# Patient Record
Sex: MALE | Race: BLACK OR AFRICAN AMERICAN | NOT HISPANIC OR LATINO | Employment: FULL TIME | URBAN - METROPOLITAN AREA
[De-identification: names, ages, dates, MRNs, and addresses within clinical notes are randomized per-mention and may not be internally consistent; named-entity substitution may affect disease eponyms.]

---

## 2019-06-13 ENCOUNTER — HISTORICAL (OUTPATIENT)
Dept: PREADMISSION TESTING | Facility: HOSPITAL | Age: 49
End: 2019-06-13

## 2019-06-13 ENCOUNTER — HISTORICAL (OUTPATIENT)
Dept: BARIATRICS | Facility: HOSPITAL | Age: 49
End: 2019-06-13

## 2021-11-22 ENCOUNTER — HISTORICAL (OUTPATIENT)
Dept: ADMINISTRATIVE | Facility: HOSPITAL | Age: 51
End: 2021-11-22

## 2021-11-23 ENCOUNTER — HISTORICAL (OUTPATIENT)
Dept: CARDIOLOGY | Facility: HOSPITAL | Age: 51
End: 2021-11-23

## 2021-12-08 ENCOUNTER — HISTORICAL (OUTPATIENT)
Dept: ADMINISTRATIVE | Facility: HOSPITAL | Age: 51
End: 2021-12-08

## 2021-12-08 ENCOUNTER — HISTORICAL (OUTPATIENT)
Dept: BARIATRICS | Facility: HOSPITAL | Age: 51
End: 2021-12-08

## 2021-12-20 ENCOUNTER — HISTORICAL (OUTPATIENT)
Dept: ADMINISTRATIVE | Facility: HOSPITAL | Age: 51
End: 2021-12-20

## 2021-12-21 ENCOUNTER — HISTORICAL (OUTPATIENT)
Dept: CARDIOLOGY | Facility: HOSPITAL | Age: 51
End: 2021-12-21

## 2022-01-12 ENCOUNTER — HISTORICAL (OUTPATIENT)
Dept: BARIATRICS | Facility: HOSPITAL | Age: 52
End: 2022-01-12

## 2022-04-30 NOTE — OP NOTE
DATE OF SURGERY:    11/23/2021    SURGEON:  Sebastian Coto DO    PREOPERATIVE DIAGNOSES:    1. Angina pectoris class III.  2. Abnormal nuclear stress test.  3. Preoperative cardiovascular clearance for bariatric surgery.  4. Morbid obesity.  5. Hypertension.  6. Exertional dyspnea.    POSTOPERATIVE DIAGNOSIS:  Severe distal right coronary artery disease.    PROCEDURES:    1. Selective diagnostic angiography.  2. Left ventriculography.  3. Simultaneous percutaneous transluminal coronary angioplasty and stenting of the distal right coronary artery into the posterior descending artery and into the posterolateral ventricular branch via the right radial artery.    COMPLICATIONS:  None.    ESTIMATED BLOOD LOSS:  None.    CONTRAST UTILIZED:  Approximately 180 cc heparin dose, 15,000 units.    HISTORY:  Mr. Puckett was brought to the cardiac catheterization laboratory today for above outlined procedures.  Informed consent was obtained.    DESCRIPTION OF PROCEDURE:  Using ultrasound fluoroscopic guidance, we accessed the right radial artery and positioned a 6-Azeri Slender sheath and proceeded with selective diagnostic coronary angiography using a Cristhian catheter.  Selective diagnostic coronary angiography revealed mild diffuse luminal irregularities throughout the left main, left anterior descending, and left circumflex coronary arteries.  The RCA was a rather large superdominant vessel and the patient was noted to have an approximate 75% lesion in the proximal portion of the PDA branch.  This was a rather large vessel as well.  Left ventriculography showed left ventricular ejection fraction in the range of 70%.     In light of the above findings and the clinical presentation and symptoms, we further interrogated the RCA lesion using intravascular ultrasonography, which revealed severe fibrocalcific coronary artery disease extending from the proximal portion of the PDA, through its ostium, and into the distal  portion of the right coronary artery.  The patient was thought to have an approximate 80% to 90% stenosis.  In light of the above findings, we then upsized to a 7-Syriac sheath system, used a 7-Syriac JR4 guiding catheter.  Through this, we were able to place a coronary wire into the PDA and posterolateral ventricular branches of the right coronary artery.  Balloon angioplasty was then performed in the PDA lesion with a 3 x 12 mm balloon inflated to nominal atmospheres.  We then simultaneously deployed a 3 x 30 mm drug-eluting stent into the PDA branch extending into the distal portion of the right coronary artery, and a 3 x 26 mm drug-eluting stent into the posterolateral ventricular branch extending approximately into the distal portion of the right coronary artery as well.  Both stents were independently deployed and post dilated to a maximum of 12 atmospheres.  We then simultaneously dilated both stents to 8 atmospheres.  Subsequent angiogram showed 0% residual stenosis in a tortuous PDA branch as well as a widely patent posterolateral ventricular branch and distal right coronary artery.  OSMAN-3 distal flow was noted throughout both vessels.  The intravascular ultrasound of the right coronary artery also showed moderate diffuse fibrocalcific lesions throughout the rather large right coronary artery.  With this, the procedure was completed successfully.  Patient tolerated the procedure rather well.  Left the cardiac catheterization laboratory in stable condition.        ______________________________  Sebastian Coto DO    CT/UD  DD:  11/23/2021  Time:  02:27PM  DT:  11/23/2021  Time:  03:01PM  Job #:  [id number]

## 2022-04-30 NOTE — OP NOTE
Patient:   Hayden Puckett            MRN: [id number]            FIN: [id number]               Age:   51 years     Sex:  Male     :  1970   Associated Diagnoses:   Abnormal nuclear stress test; Atypical angina; Preop cardiovascular exam; CAD in native artery   Author:   Sebastian Coto DO      Brief Operative Note   Operative Information   Date/ Time:  2021 14:04:00.     Preoperative Diagnosis: Abnormal nuclear stress test (BRT11-ZU R94.39), Atypical angina (RLR95-CG I20.8), Preop cardiovascular exam (DSL75-VN Z01.810).     Postoperative Diagnosis: CAD in native artery (CYZ83-RX I25.10).     Procedures Performed: LHC/LVG, via the R RA.   .     Surgeon: Sebastian Coto DO     Esimated blood loss: No blood loss.     Description of Procedure/Findings/    Complications: See op report on chart.   .

## 2022-04-30 NOTE — DISCHARGE SUMMARY
DISCHARGE DATE:      HOSPITAL COURSE:  Mr. Blake was admitted for evaluation and management of suspicious signs and symptoms suggestive of angina pectoris and was found to have an abnormal nuclear stress test recently.  The patient had also recently presented to the emergency department for complaints of chest discomfort and shortness of breath.  His evaluation was unremarkable and in light of his recent abnormal nuclear stress test, we elected to proceed with evaluating the coronary anatomy with angiography.  As outlined in the operative report, he was found to have a severe distal RCA lesion and he underwent simultaneous stenting of the PDA and posterolateral ventricular branches of the right coronary artery with the stents approximating each other simultaneously in the distal portion of the right coronary artery.  He otherwise had mild left coronary artery CAD and a preserved left ventricular systolic ejection fraction.  His postop course is being monitored closely in preparation for discharge home today.    DISCHARGE DIAGNOSES:    1. Severe distal right coronary artery coronary artery disease.  2. Status post simultaneous percutaneous transluminal coronary angioplasty and stenting of the posterior descending artery and posterolateral ventricular branches of the right coronary artery.  3. Mild left coronary artery coronary artery disease.  4. Preserved left ventricular systolic ejection fraction.  5. Massive obesity.  6. Hypertension.  7. Dyslipidemia.  8. Preoperative cardiovascular clearance for bariatric surgery.    RECOMMENDATIONS:  Mr. Puckett will be arranged for discharge home to continue the same medications as on admission and in addition, I am starting him on:  1. Pravastatin 40 mg daily.  2. Plavix 75 mg daily.  3. Baby aspirin daily.  4. Xarelto 2.5 mg twice daily.        I have asked him to call or return should he have any further problems or complaints and I will be checking on his progress  in my office in the very near future.  We will continue to monitor his progress closely and titrate medical therapies as tolerated.  Further recommendations forthcoming.        ______________________________  Sebastian Coto DO    CT/UD  DD:  11/23/2021  Time:  02:27PM  DT:  11/23/2021  Time:  03:11PM  Job #:  [id number]    cc: Sebastian Coto DO

## 2022-04-30 NOTE — OP NOTE
DATE OF SURGERY:    12/21/2021    SURGEON:  Sebastian Coto DO    PREOPERATIVE DIAGNOSES:    1. Peripheral arterial disease with Menan class 3 claudication symptomatology affecting the bilateral lower extremities.  2. Coronary artery disease with a history of recent percutaneous coronary intervention.  3. Morbid obesity.  4. Dyslipidemia.    POSTOPERATIVE DIAGNOSES:    1. Arthritis/leg pain.  2. Morbid obesity.  3. Coronary artery disease history.    PROCEDURE:    1. Abdominal aortography, renal arteriography, runoff arteriography to the bilateral lower extremities.  2. Selective bilateral lower extremity arteriography.    COMPLICATIONS:  None.    ESTIMATED BLOOD LOSS:  None.    CONTRAST UTILIZED:  110 cc.    HEPARIN DOSE:  2000 units.    FLUOROSCOPY TIME:  3 minutes.    FLUOROSCOPY DOSE:  325.11 mGy.    HISTORY:  The patient was brought to the cardiac catheterization laboratory today for above outlined procedures.  Informed consent was obtained.    DESCRIPTION OF PROCEDURE:  Using ultrasound and fluoroscopic guidance, we accessed the right radial artery and positioned a 6-Arabic slender sheath through which we proceeded with peripheral arteriography procedure.  We placed the pigtail catheter in the abdominal aorta at the level of renal arteries and subsequently in the proximal portion of the bilateral superficial femoral artery.  Subsequent angiograms revealed widely patent renal arteries and no angiographically significant obstructive peripheral arterial disease throughout the bilateral lower extremities.  With this, the procedure was completed successfully.  After the right radial artery sheath was positioned, the usual cocktail of intra-arterial nitroglycerin, verapamil, and heparin were administered into the right radial artery, after which we followed with the peripheral arteriography procedure.  The patient tolerated procedure rather well.  Left the cardiac catheterization laboratory in stable  condition.        ______________________________  DO TRACI Mariscal  DD:  12/21/2021  Time:  06:22PM  DT:  12/21/2021  Time:  08:38PM  Job #:  [id number]

## 2022-04-30 NOTE — OP NOTE
Patient:   Hayden Puckett            MRN: [id number]            FIN: [id number]               Age:   51 years     Sex:  Male     :  1970   Associated Diagnoses:   Claudication, class III; PAD (peripheral artery disease); Arthritis; Leg pain   Author:   Sebastian Coto DO      Brief Operative Note   Operative Information   Date/ Time:  2021 15:06:00.     Preoperative Diagnosis: Claudication, class III (WRR27-WW I73.9), PAD (peripheral artery disease) (GMH50-GH I73.9).     Postoperative Diagnosis: Arthritis (QXD68-PB M19.90), Leg pain (FOY59-SD M79.606).     Procedures Performed: B/L LE Arteriogram, via the R RA.   .     Surgeon: Sebastian Coto DO     Esimated blood loss: No blood loss.     Description of Procedure/Findings/    Complications: See op report on chart.   .

## 2022-07-05 ENCOUNTER — HOSPITAL ENCOUNTER (EMERGENCY)
Facility: HOSPITAL | Age: 52
Discharge: HOME | End: 2022-07-06
Attending: EMERGENCY MEDICINE
Payer: COMMERCIAL

## 2022-07-05 DIAGNOSIS — R07.9 CHEST PAIN: ICD-10-CM

## 2022-07-05 DIAGNOSIS — M62.830 BACK MUSCLE SPASM: ICD-10-CM

## 2022-07-05 DIAGNOSIS — R07.89 CHEST WALL PAIN: Primary | ICD-10-CM

## 2022-07-05 LAB
ALBUMIN SERPL-MCNC: 4 GM/DL (ref 3.5–5)
ALBUMIN/GLOB SERPL: 1.4 RATIO (ref 1.1–2)
ALP SERPL-CCNC: 65 UNIT/L (ref 40–150)
ALT SERPL-CCNC: 15 UNIT/L (ref 0–55)
AST SERPL-CCNC: 12 UNIT/L (ref 5–34)
BASOPHILS # BLD AUTO: 0.11 X10(3)/MCL (ref 0–0.2)
BASOPHILS NFR BLD AUTO: 1 %
BILIRUBIN DIRECT+TOT PNL SERPL-MCNC: 0.4 MG/DL
BUN SERPL-MCNC: 15.5 MG/DL (ref 8.4–25.7)
CALCIUM SERPL-MCNC: 9.4 MG/DL (ref 8.4–10.2)
CHLORIDE SERPL-SCNC: 102 MMOL/L (ref 98–107)
CO2 SERPL-SCNC: 27 MMOL/L (ref 22–29)
CREAT SERPL-MCNC: 1 MG/DL (ref 0.73–1.18)
EOSINOPHIL # BLD AUTO: 0.38 X10(3)/MCL (ref 0–0.9)
EOSINOPHIL NFR BLD AUTO: 3.6 %
ERYTHROCYTE [DISTWIDTH] IN BLOOD BY AUTOMATED COUNT: 13.3 % (ref 11.5–17)
GLOBULIN SER-MCNC: 2.9 GM/DL (ref 2.4–3.5)
GLUCOSE SERPL-MCNC: 87 MG/DL (ref 74–100)
HCT VFR BLD AUTO: 42.9 % (ref 42–52)
HGB BLD-MCNC: 13.8 GM/DL (ref 14–18)
IMM GRANULOCYTES # BLD AUTO: 0.05 X10(3)/MCL (ref 0–0.04)
IMM GRANULOCYTES NFR BLD AUTO: 0.5 %
INR BLD: 0.96 (ref 0–1.3)
LYMPHOCYTES # BLD AUTO: 2.99 X10(3)/MCL (ref 0.6–4.6)
LYMPHOCYTES NFR BLD AUTO: 28.3 %
MCH RBC QN AUTO: 30.7 PG (ref 27–31)
MCHC RBC AUTO-ENTMCNC: 32.2 MG/DL (ref 33–36)
MCV RBC AUTO: 95.5 FL (ref 80–94)
MONOCYTES # BLD AUTO: 1.41 X10(3)/MCL (ref 0.1–1.3)
MONOCYTES NFR BLD AUTO: 13.4 %
NEUTROPHILS # BLD AUTO: 5.6 X10(3)/MCL (ref 2.1–9.2)
NEUTROPHILS NFR BLD AUTO: 53.2 %
NRBC BLD AUTO-RTO: 0 %
PLATELET # BLD AUTO: 362 X10(3)/MCL (ref 130–400)
PMV BLD AUTO: 9.3 FL (ref 7.4–10.4)
POTASSIUM SERPL-SCNC: 4 MMOL/L (ref 3.5–5.1)
PROT SERPL-MCNC: 6.9 GM/DL (ref 6.4–8.3)
PROTHROMBIN TIME: 12.7 SECONDS (ref 12.5–14.5)
RBC # BLD AUTO: 4.49 X10(6)/MCL (ref 4.7–6.1)
SODIUM SERPL-SCNC: 137 MMOL/L (ref 136–145)
TROPONIN I SERPL-MCNC: <0.01 NG/ML (ref 0–0.04)
WBC # SPEC AUTO: 10.6 X10(3)/MCL (ref 4.5–11.5)

## 2022-07-05 PROCEDURE — 84484 ASSAY OF TROPONIN QUANT: CPT | Performed by: NURSE PRACTITIONER

## 2022-07-05 PROCEDURE — 85379 FIBRIN DEGRADATION QUANT: CPT | Performed by: EMERGENCY MEDICINE

## 2022-07-05 PROCEDURE — 93010 EKG 12-LEAD: ICD-10-PCS | Mod: ,,, | Performed by: INTERNAL MEDICINE

## 2022-07-05 PROCEDURE — 93010 ELECTROCARDIOGRAM REPORT: CPT | Mod: ,,, | Performed by: INTERNAL MEDICINE

## 2022-07-05 PROCEDURE — 36415 COLL VENOUS BLD VENIPUNCTURE: CPT | Performed by: NURSE PRACTITIONER

## 2022-07-05 PROCEDURE — 85025 COMPLETE CBC W/AUTO DIFF WBC: CPT | Performed by: NURSE PRACTITIONER

## 2022-07-05 PROCEDURE — 85610 PROTHROMBIN TIME: CPT | Performed by: NURSE PRACTITIONER

## 2022-07-05 PROCEDURE — 80053 COMPREHEN METABOLIC PANEL: CPT | Performed by: NURSE PRACTITIONER

## 2022-07-05 PROCEDURE — 99285 EMERGENCY DEPT VISIT HI MDM: CPT | Mod: 25

## 2022-07-05 PROCEDURE — 93005 ELECTROCARDIOGRAM TRACING: CPT

## 2022-07-05 RX ORDER — NITROGLYCERIN 0.4 MG/1
0.4 TABLET SUBLINGUAL EVERY 5 MIN PRN
Status: DISCONTINUED | OUTPATIENT
Start: 2022-07-05 | End: 2022-07-06 | Stop reason: HOSPADM

## 2022-07-05 NOTE — Clinical Note
"Hayden Coughlinjoe Puckett was seen and treated in our emergency department on 7/5/2022.  He may return to work on 07/08/2022.       If you have any questions or concerns, please don't hesitate to call.      WANDA/ Dr Mas RN    "

## 2022-07-06 VITALS
TEMPERATURE: 97 F | OXYGEN SATURATION: 100 % | WEIGHT: 315 LBS | BODY MASS INDEX: 49.44 KG/M2 | RESPIRATION RATE: 15 BRPM | SYSTOLIC BLOOD PRESSURE: 177 MMHG | HEART RATE: 56 BPM | DIASTOLIC BLOOD PRESSURE: 83 MMHG | HEIGHT: 67 IN

## 2022-07-06 LAB
D DIMER PPP IA.FEU-MCNC: <0.27 UG/ML FEU (ref 0–0.5)
TROPONIN I SERPL-MCNC: <0.01 NG/ML (ref 0–0.04)

## 2022-07-06 PROCEDURE — 93010 EKG 12-LEAD: ICD-10-PCS | Mod: ,,, | Performed by: INTERNAL MEDICINE

## 2022-07-06 PROCEDURE — 63600175 PHARM REV CODE 636 W HCPCS: Performed by: EMERGENCY MEDICINE

## 2022-07-06 PROCEDURE — 96372 THER/PROPH/DIAG INJ SC/IM: CPT | Performed by: EMERGENCY MEDICINE

## 2022-07-06 PROCEDURE — 25000003 PHARM REV CODE 250: Performed by: EMERGENCY MEDICINE

## 2022-07-06 PROCEDURE — 36415 COLL VENOUS BLD VENIPUNCTURE: CPT | Performed by: EMERGENCY MEDICINE

## 2022-07-06 PROCEDURE — 93010 ELECTROCARDIOGRAM REPORT: CPT | Mod: ,,, | Performed by: INTERNAL MEDICINE

## 2022-07-06 PROCEDURE — 93005 ELECTROCARDIOGRAM TRACING: CPT

## 2022-07-06 PROCEDURE — 84484 ASSAY OF TROPONIN QUANT: CPT | Performed by: EMERGENCY MEDICINE

## 2022-07-06 RX ORDER — ASPIRIN 325 MG
325 TABLET, DELAYED RELEASE (ENTERIC COATED) ORAL
Status: COMPLETED | OUTPATIENT
Start: 2022-07-06 | End: 2022-07-06

## 2022-07-06 RX ORDER — KETOROLAC TROMETHAMINE 30 MG/ML
30 INJECTION, SOLUTION INTRAMUSCULAR; INTRAVENOUS
Status: COMPLETED | OUTPATIENT
Start: 2022-07-06 | End: 2022-07-06

## 2022-07-06 RX ORDER — METHOCARBAMOL 750 MG/1
1500 TABLET, FILM COATED ORAL 3 TIMES DAILY
Qty: 42 TABLET | Refills: 0 | Status: SHIPPED | OUTPATIENT
Start: 2022-07-06 | End: 2022-07-13

## 2022-07-06 RX ORDER — IBUPROFEN 600 MG/1
600 TABLET ORAL EVERY 6 HOURS PRN
Qty: 20 TABLET | Refills: 0 | Status: SHIPPED | OUTPATIENT
Start: 2022-07-06 | End: 2024-01-12

## 2022-07-06 RX ADMIN — KETOROLAC TROMETHAMINE 30 MG: 30 INJECTION, SOLUTION INTRAMUSCULAR; INTRAVENOUS at 03:07

## 2022-07-06 RX ADMIN — ASPIRIN 325 MG: 325 TABLET, COATED ORAL at 05:07

## 2022-07-06 NOTE — DISCHARGE INSTRUCTIONS
Call Dr Coto' office to schedule a follow-up appointment.  Your EKG was abnormal in the emergency department.  Follow up with Dr. Coto for further evaluation he may need another stress test or another EKG.  If he develops any chest pain chest tightness shortness of breath please return immediately to the emergency department for further evaluation

## 2022-07-06 NOTE — FIRST PROVIDER EVALUATION
Medical screening exam completed.  I have conducted a focused provider triage encounter, findings are as follows:    Brief history of present illness:  53 y/o male who presents with having upper back pain for few days then today while at work started with left side cp and some sob. Hx cardiac stent and on thinners. Cardiologist is dr. Jake soto    There were no vitals filed for this visit.    Pertinent physical exam:  Alert, obese, ambulatory, nonlabored    Brief workup plan:  Ekg, labs, xray    Preliminary workup initiated; this workup will be continued and followed by the physician or advanced practice provider that is assigned to the patient when roomed.

## 2022-07-06 NOTE — ED PROVIDER NOTES
Encounter Date: 7/5/2022    SCRIBE #1 NOTE: I, Dave Dumont, am scribing for, and in the presence of,  Jp Mas. I have scribed the following portions of the note - Other sections scribed: HPI, ROS, PE, MDM.       History     Chief Complaint   Patient presents with    Chest Pain     C/o mid back pain for a few days and today started having cp with mild sob.       I, Jp Mas, assumed care at 0335    51 y/o M presents to ED with c/o left periscapular pain for the last few days. Pt explains he thought he had just slept bad, but the pain continued and it is worse with exertion and when taking deep breaths. When asked about chest pain, he states its hard to explains, but the pain is more in his back. Pt reports having stent put in place December 2021. Denies any medications or allergies.     The history is provided by the patient. No  was used.   Back Pain   This is a new problem. The current episode started several days ago. The problem occurs constantly. The problem has been unchanged. The pain is associated with no known injury. Pain location: periscapular  The pain does not radiate. The symptoms are aggravated by certain positions. The pain is the same all the time. Pertinent negatives include no chest pain, no fever and no abdominal pain. He has tried nothing for the symptoms. The treatment provided no relief. Risk factors include obesity.     Review of patient's allergies indicates:  No Known Allergies  No past medical history on file.  No past surgical history on file.  No family history on file.     Review of Systems   Constitutional: Negative for chills and fever.   Respiratory: Negative for cough and shortness of breath.    Cardiovascular: Negative for chest pain.   Gastrointestinal: Negative for abdominal pain, nausea and vomiting.   Musculoskeletal: Positive for back pain. Negative for myalgias.   Neurological: Negative for syncope.   All other systems reviewed and are  negative.      Physical Exam     Initial Vitals [07/05/22 1959]   BP Pulse Resp Temp SpO2   (!) 168/94 71 18 97.3 °F (36.3 °C) 99 %      MAP       --         Physical Exam    Constitutional: He appears well-developed and well-nourished. No distress.   HENT:   Head: Normocephalic and atraumatic.   Cardiovascular: Normal rate.   Equal radial pulses    Pulmonary/Chest: No respiratory distress. He has no wheezes. He has no rhonchi. He exhibits no tenderness.   Abdominal: Abdomen is soft. He exhibits no distension. There is no abdominal tenderness. There is no rebound and no guarding.   Musculoskeletal:         General: Tenderness present. Normal range of motion.      Comments: Left periscapular tenderness     Neurological: He is alert and oriented to person, place, and time. He has normal strength.   Skin: Skin is warm and dry.   Psychiatric: He has a normal mood and affect.         ED Course   Procedures  Labs Reviewed   CBC WITH DIFFERENTIAL - Abnormal; Notable for the following components:       Result Value    RBC 4.49 (*)     Hgb 13.8 (*)     MCV 95.5 (*)     MCHC 32.2 (*)     Mono # 1.41 (*)     IG# 0.05 (*)     All other components within normal limits   TROPONIN I - Normal   PROTIME-INR - Normal   TROPONIN I - Normal   D DIMER, QUANTITATIVE - Normal   COMPREHENSIVE METABOLIC PANEL        ECG Results          EKG 12-lead (Preliminary result)  Result time 07/06/22 03:58:26    ED Interpretation by Jp Mas MD (07/06/22 03:58:26, [facility] - Emergency Dept, Emergency Medicine)    Repeat EKG [id number]. Normal sinus rhythm 62 beats per minute.  Mild downsloping of the ST segment 3 and AVF no ST elevations.  No significant changes from previous EKG                             Imaging Results          X-Ray Chest 1 View (Preliminary result)  Result time 07/06/22 03:45:51    ED Interpretation by Jp Mas MD (07/06/22 03:45:51, [facility] - Emergency Dept, Emergency  Medicine)    No infiltrates or other acute findings                               Medications   nitroGLYCERIN SL tablet 0.4 mg (has no administration in time range)   aspirin EC tablet 325 mg (has no administration in time range)   ketorolac injection 30 mg (30 mg Intramuscular Given 7/6/22 0345)     Medical Decision Making:   Clinical Tests:   Lab Tests: Reviewed and Ordered  Radiological Study: Reviewed and Ordered          Scribe Attestation:   Scribe #1: I performed the above scribed service and the documentation accurately describes the services I performed. I attest to the accuracy of the note.    Attending Attestation:           Physician Attestation for Scribe:  Physician Attestation Statement for Scribe #1: I, Jp Mas, reviewed documentation, as scribed by Dave Dumont in my presence, and it is both accurate and complete.             ED Course as of 07/06/22 0501   Wed Jul 06, 2022   0345 Troponin and repeat troponin are negative.  Symptoms:  Similar to musculoskeletal pain he has a tenderness in the left back around the scapula which is very was hurting.  States it is worse with movement.  Equal pulses.  To the the symptoms are consistent with aortic dissection or MI at this time [LF]   0353 EKG that was done at 8:00 p.m. on 07/05/2022.  73 per.  Some downsloping of the ST segment in lead 3 and AVF.  No ST segment elevations.  Normal sinus rhythm.  No prior EKGs available for comparison.  His repeat troponin is negative will get repeat EKG at this time.  Patient states he had a stent in December due to some blockage identified on a stress test.  Was having some mild chest pains at that time that felt nothing like his symptoms today [LF]   0318 Discussed the EKG findings with Dr. Coto.  He is familiar with the patient.  He agrees symptoms sound noncardiac.  Have 2-troponins.  He recommend he follow-up in his office as an outpatient. [LF]      ED Course User Index  [LF] Jp Mas MD              Clinical Impression:   Final diagnoses:  [R07.9] Chest pain  [R07.89] Chest wall pain (Primary)  [M62.830] Back muscle spasm          ED Disposition Condition    Discharge Stable        ED Prescriptions     Medication Sig Dispense Start Date End Date Auth. Provider    methocarbamoL (ROBAXIN) 750 MG Tab Take 2 tablets (1,500 mg total) by mouth 3 (three) times daily. for 7 days 42 tablet 7/6/2022 7/13/2022 Jp Mas MD    ibuprofen (ADVIL,MOTRIN) 600 MG tablet Take 1 tablet (600 mg total) by mouth every 6 (six) hours as needed for Pain. 20 tablet 7/6/2022  Jp Mas MD        Follow-up Information     Follow up With Specialties Details Why Contact Info    Sebastian Coto, DO Cardiology Schedule an appointment as soon as possible for a visit   [address] [place] [address]  [phone]      Primary care provider   If your symptoms worsen or change please return to the emergency department for re-evaluation Call your primary care provider to schedule a follow-up appointment within a week           Jp Mas MD  07/06/22 0827

## 2022-11-23 ENCOUNTER — ANESTHESIA (OUTPATIENT)
Dept: ENDOSCOPY | Facility: HOSPITAL | Age: 52
End: 2022-11-23
Payer: COMMERCIAL

## 2022-11-23 ENCOUNTER — HOSPITAL ENCOUNTER (OUTPATIENT)
Facility: HOSPITAL | Age: 52
Discharge: HOME | End: 2022-11-23
Attending: INTERNAL MEDICINE | Admitting: INTERNAL MEDICINE
Payer: COMMERCIAL

## 2022-11-23 ENCOUNTER — ANESTHESIA EVENT (OUTPATIENT)
Dept: ENDOSCOPY | Facility: HOSPITAL | Age: 52
End: 2022-11-23
Payer: COMMERCIAL

## 2022-11-23 VITALS
OXYGEN SATURATION: 96 % | DIASTOLIC BLOOD PRESSURE: 69 MMHG | SYSTOLIC BLOOD PRESSURE: 123 MMHG | RESPIRATION RATE: 21 BRPM | TEMPERATURE: 98 F | HEART RATE: 62 BPM

## 2022-11-23 DIAGNOSIS — Z12.11 SPECIAL SCREENING FOR MALIGNANT NEOPLASM OF COLON: ICD-10-CM

## 2022-11-23 PROCEDURE — 37000008 HC ANESTHESIA 1ST 15 MINUTES: Performed by: INTERNAL MEDICINE

## 2022-11-23 PROCEDURE — 45380 COLONOSCOPY AND BIOPSY: CPT | Performed by: INTERNAL MEDICINE

## 2022-11-23 PROCEDURE — 63600175 PHARM REV CODE 636 W HCPCS: Performed by: ANESTHESIOLOGY

## 2022-11-23 PROCEDURE — 37000009 HC ANESTHESIA EA ADD 15 MINS: Performed by: INTERNAL MEDICINE

## 2022-11-23 PROCEDURE — 25000003 PHARM REV CODE 250: Performed by: ANESTHESIOLOGY

## 2022-11-23 PROCEDURE — 27201423 OPTIME MED/SURG SUP & DEVICES STERILE SUPPLY: Performed by: INTERNAL MEDICINE

## 2022-11-23 RX ORDER — PROPOFOL 10 MG/ML
VIAL (ML) INTRAVENOUS
Status: DISCONTINUED | OUTPATIENT
Start: 2022-11-23 | End: 2022-11-23

## 2022-11-23 RX ORDER — GLYCOPYRROLATE 0.2 MG/ML
INJECTION INTRAMUSCULAR; INTRAVENOUS
Status: DISCONTINUED | OUTPATIENT
Start: 2022-11-23 | End: 2022-11-23

## 2022-11-23 RX ORDER — PROPOFOL 10 MG/ML
VIAL (ML) INTRAVENOUS
Status: COMPLETED
Start: 2022-11-23 | End: 2022-11-23

## 2022-11-23 RX ORDER — LEVOTHYROXINE SODIUM 125 UG/1
125 TABLET ORAL
COMMUNITY

## 2022-11-23 RX ORDER — CLOPIDOGREL BISULFATE 75 MG/1
75 TABLET ORAL DAILY
COMMUNITY

## 2022-11-23 RX ORDER — SODIUM CHLORIDE, SODIUM GLUCONATE, SODIUM ACETATE, POTASSIUM CHLORIDE AND MAGNESIUM CHLORIDE 30; 37; 368; 526; 502 MG/100ML; MG/100ML; MG/100ML; MG/100ML; MG/100ML
INJECTION, SOLUTION INTRAVENOUS ONCE
Status: COMPLETED | OUTPATIENT
Start: 2022-11-23 | End: 2022-11-23

## 2022-11-23 RX ORDER — PRAVASTATIN SODIUM 40 MG/1
40 TABLET ORAL DAILY
COMMUNITY

## 2022-11-23 RX ORDER — HYDROCHLOROTHIAZIDE 25 MG/1
25 TABLET ORAL DAILY
COMMUNITY

## 2022-11-23 RX ORDER — AMLODIPINE AND BENAZEPRIL HYDROCHLORIDE 10; 40 MG/1; MG/1
1 CAPSULE ORAL DAILY
COMMUNITY
End: 2024-01-12

## 2022-11-23 RX ORDER — LIDOCAINE HYDROCHLORIDE 20 MG/ML
INJECTION, SOLUTION EPIDURAL; INFILTRATION; INTRACAUDAL; PERINEURAL
Status: DISCONTINUED
Start: 2022-11-23 | End: 2022-11-23 | Stop reason: HOSPADM

## 2022-11-23 RX ORDER — RANOLAZINE 500 MG/1
500 TABLET, EXTENDED RELEASE ORAL ONCE
COMMUNITY

## 2022-11-23 RX ORDER — MONTELUKAST SODIUM 10 MG/1
10 TABLET ORAL NIGHTLY
COMMUNITY
End: 2024-01-12

## 2022-11-23 RX ORDER — CHOLECALCIFEROL (VITAMIN D3) 25 MCG
2000 TABLET ORAL DAILY
COMMUNITY
End: 2024-01-12

## 2022-11-23 RX ORDER — ASPIRIN 81 MG/1
81 TABLET ORAL DAILY
COMMUNITY

## 2022-11-23 RX ORDER — GLYCOPYRROLATE 0.2 MG/ML
INJECTION INTRAMUSCULAR; INTRAVENOUS
Status: COMPLETED
Start: 2022-11-23 | End: 2022-11-23

## 2022-11-23 RX ADMIN — GLYCOPYRROLATE 0.2 MG: 0.2 INJECTION INTRAMUSCULAR; INTRAVENOUS at 11:11

## 2022-11-23 RX ADMIN — PROPOFOL 100 MG: 10 INJECTION, EMULSION INTRAVENOUS at 11:11

## 2022-11-23 RX ADMIN — PROPOFOL 50 MG: 10 INJECTION, EMULSION INTRAVENOUS at 11:11

## 2022-11-23 RX ADMIN — PROPOFOL 30 MG: 10 INJECTION, EMULSION INTRAVENOUS at 11:11

## 2022-11-23 RX ADMIN — SODIUM CHLORIDE, SODIUM GLUCONATE, SODIUM ACETATE, POTASSIUM CHLORIDE AND MAGNESIUM CHLORIDE: 526; 502; 368; 37; 30 INJECTION, SOLUTION INTRAVENOUS at 10:11

## 2022-11-23 RX ADMIN — PROPOFOL 20 MG: 10 INJECTION, EMULSION INTRAVENOUS at 11:11

## 2022-11-23 RX ADMIN — SODIUM CHLORIDE: 9 INJECTION, SOLUTION INTRAVENOUS at 11:11

## 2022-11-23 NOTE — ANESTHESIA POSTPROCEDURE EVALUATION
Anesthesia Post Evaluation    Patient: Hayden Puckett    Procedure(s) Performed: Procedure(s) (LRB):  COLON (N/A)  POLYPECTOMY    Final Anesthesia Type: MAC      Patient location during evaluation: GI PACU  Patient participation: Yes- Able to Participate  Level of consciousness: awake and alert  Post-procedure vital signs: reviewed and stable  Pain management: adequate  Airway patency: patent  QUEENIE mitigation strategies: Multimodal analgesia    Anesthetic complications: no      Cardiovascular status: stable  Respiratory status: unassisted  Hydration status: euvolemic  Follow-up not needed.          Vitals Value Taken Time   /73 11/23/22 1201   Temp 37 11/23/22 1208   Pulse 66 11/23/22 1201   Resp 28 11/23/22 1201   SpO2 96 % 11/23/22 1201         No case tracking events are documented in the log.      Pain/Kelly Score: Kelly Score: 9 (11/23/2022 12:01 PM)

## 2022-11-23 NOTE — ANESTHESIA PREPROCEDURE EVALUATION
11/23/2022  Hayden Puckett is a 52 y.o., male.      Pre-op Assessment    I have reviewed the Patient Summary Reports.     I have reviewed the Nursing Notes. I have reviewed the NPO Status.   I have reviewed the Medications.     Review of Systems  Cardiovascular:   Hypertension CAD asymptomatic CABG/stent     Pulmonary:   Sleep Apnea, CPAP    Endocrine:  Morbid Obesity / BMI > 40      Physical Exam  General: Well nourished    Airway:  Mallampati: III / II  Mouth Opening: Normal  TM Distance: Normal  Tongue: Large  Neck ROM: Normal ROM    Dental:  Intact    Chest/Lungs:  Clear to auscultation    Heart:  Rate: Normal        Anesthesia Plan  Type of Anesthesia, risks & benefits discussed:    Anesthesia Type: Gen Natural Airway, MAC  Intra-op Monitoring Plan: Standard ASA Monitors  Induction:  IV  Informed Consent: Informed consent signed with the Patient and all parties understand the risks and agree with anesthesia plan.  All questions answered.   ASA Score: 3    Ready For Surgery From Anesthesia Perspective.     .

## 2022-11-23 NOTE — TRANSFER OF CARE
Anesthesia Transfer of Care Note    Patient: Hayden Puckett    Procedure(s) Performed: Procedure(s) (LRB):  COLON (N/A)  POLYPECTOMY    Patient location: GI    Anesthesia Type: general    Transport from OR: Transported from OR on room air with adequate spontaneous ventilation    Post pain: adequate analgesia    Post assessment: no apparent anesthetic complications and tolerated procedure well    Post vital signs: stable    Level of consciousness: awake and alert    Nausea/Vomiting: no nausea/vomiting    Complications: none    Transfer of care protocol was followed      Last vitals:   Visit Vitals  BP (!) 165/85   Pulse 73   Temp 36.6 °C (97.9 °F)   Resp (!) 28   SpO2 98%

## 2022-11-23 NOTE — PROVATION PATIENT INSTRUCTIONS
Discharge Summary/Instructions after an Endoscopic Procedure  Patient Name: Hayden Puckett  Patient MRN: [id number]  Patient YOB: 1970 Wednesday, November 23, 2022  Drake Farley MD  Dear patient,  As a result of recent federal legislation (The Federal Cures Act), you may   receive lab or pathology results from your procedure in your [facility]   account before your physician is able to contact you. Your physician or   their representative will relay the results to you with their   recommendations at their soonest availability.  Thank you,  RESTRICTIONS:  During your procedure today, you received medications for sedation.  These   medications may affect your judgment, balance and coordination.  Therefore,   for 24 hours, you have the following restrictions:   - DO NOT drive a car, operate machinery, make legal/financial decisions,   sign important papers or drink alcohol.    ACTIVITY:  Today: no heavy lifting, straining or running due to procedural   sedation/anesthesia.  The following day: return to full activity including work.  DIET:  Eat and drink normally unless instructed otherwise.     TREATMENT FOR COMMON SIDE EFFECTS:  - Mild abdominal pain, nausea, belching, bloating or excessive gas:  rest,   eat lightly and use a heating pad.  - Sore Throat: treat with throat lozenges and/or gargle with warm salt   water.  - Because air was used during the procedure, expelling large amounts of air   from your rectum or belching is normal.  - If a bowel prep was taken, you may not have a bowel movement for 1-3 days.    This is normal.  SYMPTOMS TO WATCH FOR AND REPORT TO YOUR PHYSICIAN:  1. Abdominal pain or bloating, other than gas cramps.  2. Chest pain.  3. Back pain.  4. Signs of infection such as: chills or fever occurring within 24 hours   after the procedure.  5. Rectal bleeding, which would show as bright red, maroon, or black stools.   (A tablespoon of blood from the rectum is not serious,  especially if   hemorrhoids are present.)  6. Vomiting.  7. Weakness or dizziness.  GO DIRECTLY TO THE NEAREST EMERGENCY ROOM IF YOU HAVE ANY OF THE FOLLOWING:      Difficulty breathing              Chills and/or fever over 101 F   Persistent vomiting and/or vomiting blood   Severe abdominal pain   Severe chest pain   Black, tarry stools   Bleeding- more than one tablespoon   Any other symptom or condition that you feel may need urgent attention  Your doctor recommends these additional instructions:  If any biopsies were taken, your doctors clinic will contact you in 1 to 2   weeks with any results.  - Discharge patient to home.   - Resume previous diet.   - Continue present medications.   - Await pathology results.   - Repeat colonoscopy in 7 years for surveillance.   - Return to GI office PRN.  For questions, problems or results please call your physician - Drake Farley MD at Work:  [phone].  [facility] [place], EMERGENCY ROOM PHONE NUMBER: [phone]  IF A COMPLICATION OR EMERGENCY SITUATION ARISES AND YOU ARE UNABLE TO REACH   YOUR PHYSICIAN - GO DIRECTLY TO THE EMERGENCY ROOM.  MD Drake Banks MD  11/23/2022 11:43:50 AM  This report has been verified and signed electronically.  Dear patient,  As a result of recent federal legislation (The Federal Cures Act), you may   receive lab or pathology results from your procedure in your [facility]   account before your physician is able to contact you. Your physician or   their representative will relay the results to you with their   recommendations at their soonest availability.  Thank you,  PROVATION

## 2022-11-29 LAB
ESTROGEN SERPL-MCNC: NORMAL PG/ML
INSULIN SERPL-ACNC: NORMAL U[IU]/ML
LAB AP CLINICAL INFORMATION: NORMAL
LAB AP GROSS DESCRIPTION: NORMAL
LAB AP REPORT FOOTNOTES: NORMAL
T3RU NFR SERPL: NORMAL %

## 2023-03-30 NOTE — H&P
History and Physical           HPI:     Patient is a 53 y.o. male presents for colorectal cancer screening    PCP:  Primary Doctor No    Review of patient's allergies indicates:  No Known Allergies     Past Medical History:  Past Medical History:   Diagnosis Date    Hypertension     Sleep apnea     Thyroid disease       Past Surgical History:  Past Surgical History:   Procedure Laterality Date    CARDIAC SURGERY      COLONOSCOPY N/A 11/23/2022    Procedure: COLON;  Surgeon: Drake Farley MD;  Location: [facility] ENDOSCOPY;  Service: Gastroenterology;  Laterality: N/A;    THYROID SURGERY      TONSILLECTOMY        Family History:  History reviewed. No pertinent family history.  Social History:  Social History     Tobacco Use    Smoking status: Not on file    Smokeless tobacco: Not on file   Substance Use Topics    Alcohol use: Not on file         Review of Systems:     Review of Systems    Objective:     VITAL SIGNS: 24 HR MIN & MAX LAST    No data recorded  97.9 °F (36.6 °C)        No data recorded  123/69     No data recorded  62     No data recorded  (!) 21    No data recorded  96 %      Physical Exam      No results found for this or any previous visit (from the past 48 hour(s)).    No results found.    @[facility]@    Assessment /Plan:   Colorectal cancer screening-proceed with colonoscopy  There are no problems to display for this patient.       Thank you for allowing us to participate in this patient's care.

## 2023-04-11 ENCOUNTER — PATIENT MESSAGE (OUTPATIENT)
Dept: RESEARCH | Facility: HOSPITAL | Age: 53
End: 2023-04-11
Payer: COMMERCIAL

## 2023-09-01 ENCOUNTER — TELEPHONE (OUTPATIENT)
Dept: ADMINISTRATIVE | Facility: HOSPITAL | Age: 53
End: 2023-09-01
Payer: COMMERCIAL

## 2023-11-16 ENCOUNTER — TELEPHONE (OUTPATIENT)
Dept: FAMILY MEDICINE | Facility: CLINIC | Age: 53
End: 2023-11-16
Payer: COMMERCIAL

## 2023-11-16 NOTE — TELEPHONE ENCOUNTER
----- Message from Maria C Priest sent at 11/14/2023  9:34 AM CST -----  HE ASKED IF WE EVER SENT IN THE PAPERWORK FOR HIS WEIGHT LOSS SURGERY

## 2023-11-16 NOTE — TELEPHONE ENCOUNTER
Spoke with patient in regards to weight loss surgery papers. We did receive them and will be getting them filled out.

## 2024-01-03 ENCOUNTER — OFFICE VISIT (OUTPATIENT)
Dept: FAMILY MEDICINE | Facility: CLINIC | Age: 54
End: 2024-01-03
Payer: COMMERCIAL

## 2024-01-03 VITALS
HEART RATE: 74 BPM | BODY MASS INDEX: 49.44 KG/M2 | RESPIRATION RATE: 20 BRPM | DIASTOLIC BLOOD PRESSURE: 78 MMHG | SYSTOLIC BLOOD PRESSURE: 136 MMHG | WEIGHT: 315 LBS | OXYGEN SATURATION: 99 % | TEMPERATURE: 97 F | HEIGHT: 67 IN

## 2024-01-03 DIAGNOSIS — E78.2 MIXED HYPERLIPIDEMIA: ICD-10-CM

## 2024-01-03 DIAGNOSIS — E55.9 VITAMIN D DEFICIENCY: ICD-10-CM

## 2024-01-03 DIAGNOSIS — Z23 NEED FOR VACCINATION: ICD-10-CM

## 2024-01-03 DIAGNOSIS — Z01.818 PRE-OP EXAM: ICD-10-CM

## 2024-01-03 DIAGNOSIS — E66.01 CLASS 3 SEVERE OBESITY DUE TO EXCESS CALORIES WITH SERIOUS COMORBIDITY AND BODY MASS INDEX (BMI) OF 60.0 TO 69.9 IN ADULT: ICD-10-CM

## 2024-01-03 DIAGNOSIS — Z00.00 ANNUAL PHYSICAL EXAM: Primary | ICD-10-CM

## 2024-01-03 DIAGNOSIS — I10 PRIMARY HYPERTENSION: ICD-10-CM

## 2024-01-03 DIAGNOSIS — G47.30 SLEEP APNEA, UNSPECIFIED TYPE: ICD-10-CM

## 2024-01-03 PROBLEM — E66.813 CLASS 3 SEVERE OBESITY DUE TO EXCESS CALORIES WITH SERIOUS COMORBIDITY AND BODY MASS INDEX (BMI) OF 60.0 TO 69.9 IN ADULT: Status: ACTIVE | Noted: 2024-01-03

## 2024-01-03 PROBLEM — E66.9 OBESITY, UNSPECIFIED: Status: ACTIVE | Noted: 2024-01-03

## 2024-01-03 PROBLEM — R73.03 PRE-DIABETES: Status: ACTIVE | Noted: 2024-01-03

## 2024-01-03 PROBLEM — R73.03 PRE-DIABETES: Status: RESOLVED | Noted: 2024-01-03 | Resolved: 2024-01-03

## 2024-01-03 LAB
BILIRUB SERPL-MCNC: NORMAL MG/DL
BLOOD URINE, POC: NORMAL
CLARITY, POC UA: CLEAR
COLOR, POC UA: YELLOW
GLUCOSE UR QL STRIP: NORMAL
KETONES UR QL STRIP: NORMAL
LEUKOCYTE ESTERASE URINE, POC: NORMAL
NITRITE, POC UA: NORMAL
PH, POC UA: 6
POC CREATININE: 50 MG/DL (ref 0.6–1.3)
POC MICROALBUMIN URINE: 10
POC MICROALBUMIN/CREATININE RATIO: <30 ΜG/MG (ref 0–30)
PROTEIN, POC: NORMAL
SPECIFIC GRAVITY, POC UA: 1.01
UROBILINOGEN, POC UA: NORMAL

## 2024-01-03 PROCEDURE — 3075F SYST BP GE 130 - 139MM HG: CPT | Mod: CPTII,,, | Performed by: FAMILY MEDICINE

## 2024-01-03 PROCEDURE — 1159F MED LIST DOCD IN RCRD: CPT | Mod: CPTII,,, | Performed by: FAMILY MEDICINE

## 2024-01-03 PROCEDURE — 3078F DIAST BP <80 MM HG: CPT | Mod: CPTII,,, | Performed by: FAMILY MEDICINE

## 2024-01-03 PROCEDURE — 1160F RVW MEDS BY RX/DR IN RCRD: CPT | Mod: CPTII,,, | Performed by: FAMILY MEDICINE

## 2024-01-03 PROCEDURE — 81002 URINALYSIS NONAUTO W/O SCOPE: CPT | Mod: ,,, | Performed by: FAMILY MEDICINE

## 2024-01-03 PROCEDURE — 3008F BODY MASS INDEX DOCD: CPT | Mod: CPTII,,, | Performed by: FAMILY MEDICINE

## 2024-01-03 PROCEDURE — 82044 UR ALBUMIN SEMIQUANTITATIVE: CPT | Mod: QW,,, | Performed by: FAMILY MEDICINE

## 2024-01-03 PROCEDURE — 99396 PREV VISIT EST AGE 40-64: CPT | Mod: 25,,, | Performed by: FAMILY MEDICINE

## 2024-01-03 PROCEDURE — 90471 IMMUNIZATION ADMIN: CPT | Mod: ,,, | Performed by: FAMILY MEDICINE

## 2024-01-03 PROCEDURE — 90714 TD VACC NO PRESV 7 YRS+ IM: CPT | Mod: ,,, | Performed by: FAMILY MEDICINE

## 2024-01-03 NOTE — ASSESSMENT & PLAN NOTE
Lab Results   Component Value Date    TRIG 96 12/04/2023    HDL 39 (L) 12/04/2023    LDLCALC 61 12/04/2023   Patient is at goal.

## 2024-01-03 NOTE — PROGRESS NOTES
Family Medicine  Patient Name: Hayden Puckett     Patient ID: [id number]     Chief Complaint: Annual Exam and Pre-op Exam      HPI:     Hayden Puckett is a 53 y.o. male here today for a wellness visit and pre-op physical exam for weight loss surgery. Recommend patient get the Shingles Vaccine at a local pharmacy, patient verbalizes understanding.     274}  Health Maintenance         Date Due Completion Date    Hepatitis C Screening Never done ---    HIV Screening Never done ---    Shingles Vaccine (1 of 2) Never done ---    COVID-19 Vaccine (4 - 2023-24 season) 09/01/2023 1/11/2022    Influenza Vaccine (1) 06/30/2024 (Originally 9/1/2023) 12/30/2022    Hemoglobin A1c (Prediabetes) 12/04/2024 12/4/2023    High Dose Statin 01/03/2025 1/3/2024    Lipid Panel 12/04/2028 12/4/2023    Colorectal Cancer Screening 11/23/2029 11/23/2022    TETANUS VACCINE 01/03/2034 1/3/2024             Past Medical History:   Diagnosis Date    Hypertension     Hypogonadism in male     Hypothyroidism associated with surgical procedure     Insulin resistance     Obesity,     QUEENIE (obstructive sleep apnea)     Sleep apnea     Vitamin D deficiency         Past Surgical History:   Procedure Laterality Date    COLONOSCOPY N/A 11/23/2022    Procedure: COLON;  Surgeon: Drake Farley MD;  Location: [facility] ENDOSCOPY;  Service: Gastroenterology;  Laterality: N/A;    LEFT HEART CATHETERIZATION  2022    MANDIBLE FRACTURE SURGERY      THYROID SURGERY      TONSILLECTOMY          Social History     Socioeconomic History    Marital status:     Number of children: 3   Tobacco Use    Smoking status: Never    Smokeless tobacco: Never   Substance and Sexual Activity    Alcohol use: Never     Social Determinants of Health     Financial Resource Strain: Low Risk  (11/27/2023)    Overall Financial Resource Strain (CARDIA)     Difficulty of Paying Living Expenses: Not very hard   Food Insecurity: No Food Insecurity (11/27/2023)    Hunger Vital  Sign     Worried About Running Out of Food in the Last Year: Never true     Ran Out of Food in the Last Year: Never true   Transportation Needs: No Transportation Needs (11/27/2023)    PRAPARE - Transportation     Lack of Transportation (Medical): No     Lack of Transportation (Non-Medical): No   Physical Activity: Insufficiently Active (11/27/2023)    Exercise Vital Sign     Days of Exercise per Week: 3 days     Minutes of Exercise per Session: 20 min   Stress: No Stress Concern Present (11/27/2023)    [employer] [place] [employer] - Occupational Stress Questionnaire     Feeling of Stress : Not at all   Social Connections: Unknown (11/27/2023)    Social Connection and Isolation Panel [NHANES]     Frequency of Communication with Friends and Family: More than three times a week     Frequency of Social Gatherings with Friends and Family: More than three times a week     Active Member of Clubs or Organizations: No     Attends Club or Organization Meetings: Never     Marital Status:    Housing Stability: Low Risk  (11/27/2023)    Housing Stability Vital Sign     Unable to Pay for Housing in the Last Year: No     Number of Places Lived in the Last Year: 1     Unstable Housing in the Last Year: No        Current Outpatient Medications   Medication Instructions    amLODIPine-benazepriL (LOTREL) 10-40 mg per capsule 1 capsule, Oral, Daily    aspirin (ECOTRIN) 81 mg, Oral, Daily    clopidogreL (PLAVIX) 75 mg, Oral, Daily    hydroCHLOROthiazide (HYDRODIURIL) 25 mg, Oral, Daily    ibuprofen (ADVIL,MOTRIN) 600 mg, Oral, Every 6 hours PRN    levothyroxine (SYNTHROID) 125 mcg, Oral, Before breakfast    montelukast (SINGULAIR) 10 mg, Oral, Nightly    pravastatin (PRAVACHOL) 40 mg, Oral, Daily    ranolazine (RANEXA) 500 mg, Oral, Once    vitamin D (VITAMIN D3) 2,000 Units, Oral, Daily       Review of patient's allergies indicates:  No Known Allergies     Immunization History   Administered Date(s) Administered     "COVID-19, MRNA, LN-S, PF (Pfizer) (Purple Cap) 05/12/2021, 06/02/2021, 01/11/2022    Td (ADULT) 01/03/2024       Patient Care Team:  Alex Calvillo Sr., MD as PCP - General (Family Medicine)  Eliud Lepe MD as Consulting Physician (Cardiology)  Dave Bustillo OD (Optometry)  Jhony Vaz III, MD as Consulting Physician (Otolaryngology)  To Vázquez NP (Pulmonary Disease)  Amor Eaton MD as Consulting Physician (Orthopedic Surgery)     Subjective:     Review of Systems    10 point review of systems conducted, negative except as stated in the history of present illness. See HPI for details.    Objective:     Visit Vitals  /78 (BP Location: Left arm, Patient Position: Sitting, BP Method: Thigh Cuff (Manual))   Pulse 74   Temp 97.2 °F (36.2 °C)   Resp 20   Ht 5' 7" (1.702 m)   Wt (!) 185.5 kg (409 lb)   SpO2 99%   BMI 64.06 kg/m²       Physical Exam  Constitutional:       Appearance: He is obese.   HENT:      Head: Normocephalic and atraumatic.      Right Ear: Tympanic membrane, ear canal and external ear normal.      Left Ear: Tympanic membrane, ear canal and external ear normal.      Mouth/Throat:      Mouth: Mucous membranes are moist.   Eyes:      Extraocular Movements: Extraocular movements intact.      Conjunctiva/sclera: Conjunctivae normal.      Pupils: Pupils are equal, round, and reactive to light.   Cardiovascular:      Rate and Rhythm: Normal rate and regular rhythm.      Pulses:           Carotid pulses are 2+ on the right side and 2+ on the left side.       Dorsalis pedis pulses are 2+ on the right side and 2+ on the left side.        Posterior tibial pulses are 2+ on the right side and 2+ on the left side.   Pulmonary:      Effort: Pulmonary effort is normal.      Breath sounds: Normal breath sounds.   Abdominal:      Palpations: Abdomen is soft.   Musculoskeletal:         General: No swelling or tenderness. Normal range of motion.      Cervical back: Normal range of " motion and neck supple.   Feet:      Right foot:      Protective Sensation: 3 sites tested.  3 sites sensed.      Skin integrity: Skin integrity normal. No ulcer, blister, skin breakdown, erythema, warmth, callus, dry skin or fissure.      Toenail Condition: Right toenails are normal.      Left foot:      Protective Sensation: 3 sites tested.  3 sites sensed.      Skin integrity: Skin integrity normal. No ulcer, blister, skin breakdown, erythema, warmth, callus, dry skin or fissure.      Toenail Condition: Left toenails are normal.      Comments: There is bilateral pes planus.  Lymphadenopathy:      Cervical: No cervical adenopathy.   Skin:     General: Skin is warm and dry.   Neurological:      General: No focal deficit present.      Mental Status: He is alert and oriented to person, place, and time.      Sensory: No sensory deficit.      Motor: No weakness.      Coordination: Coordination normal.   Psychiatric:         Mood and Affect: Mood normal.         Behavior: Behavior normal.         Thought Content: Thought content normal.         Judgment: Judgment normal.         Assessment:       ICD-10-CM ICD-9-CM   1. Annual physical exam  Z00.00 V70.0   2. Primary hypertension  I10 401.9   3. Mixed hyperlipidemia  E78.2 272.2   4. Sleep apnea, unspecified type  G47.30 780.57   5. Vitamin D deficiency  E55.9 268.9   6. Class 3 severe obesity due to excess calories with serious comorbidity and body mass index (BMI) of 60.0 to 69.9 in adult  E66.01 278.01    Z68.44 V85.44   7. Pre-op exam  Z01.818 V72.84   8. Need for vaccination  Z23 V05.9       Plan:   1. Annual physical exam  Overview:  Prostate Cancer Screening - Last PSA 12/04/2023 was 0.5 ng/mL.  Colon Cancer Screening - Last Colonoscopy was on 11/23/2022. Followed by Dr. Farley.  Eye Exam - Recommend annually. Followed by Dr. Bustillo.  Dental Exam - Recommend biannually  Vaccinations -   Immunization History   Administered Date(s) Administered    COVID-19,  MRNA, LN-S, PF (Pfizer) (Purple Cap) 01/11/2022         Orders:  -     POCT EKG 12-LEAD (NOT FOR [facility] USE)  -     POCT Microalbumin/Creatinine Ratio  -     POCT urine dipstick without microscope    2. Primary hypertension  Overview:  Hypertension Medications               amLODIPine-benazepriL (LOTREL) 10-40 mg per capsule Take 1 capsule by mouth once daily.    hydroCHLOROthiazide (HYDRODIURIL) 25 MG tablet Take 25 mg by mouth once daily.        Followed by Cardiology.  Low Sodium Diet (DASH Diet - Less than 2 grams of sodium per day).  Monitor blood pressure daily and log. Report consistent numbers greater than 140/90.  Maintain healthy weight with goal BMI <30. Exercise 30 minutes per day, 5 days per week.     Assessment & Plan:  BP Readings from Last 1 Encounters:   01/03/24 136/78   Patient is at goal.       3. Mixed hyperlipidemia  Overview:  Hyperlipidemia Medications               pravastatin (PRAVACHOL) 40 MG tablet Take 40 mg by mouth once daily.        Followed by Cardiology.  Stressed importance of dietary modifications. Follow a low cholesterol, low saturated fat diet with less that 200mg of cholesterol a day.  Avoid fried foods and high saturated fats (high saturated fats less than 7% of calories).  Add Flax Seed/Fish Oil supplements to diet. Increase dietary fiber.  Regular exercise can reduce LDL and raise HDL. Stressed importance of physical activity 5 times per week for 30 minutes per day.      Assessment & Plan:  Lab Results   Component Value Date    TRIG 96 12/04/2023    HDL 39 (L) 12/04/2023    LDLCALC 61 12/04/2023   Patient is at goal.       4. Sleep apnea, unspecified type  Overview:  Wears CPAP/BiPAP and reports compliance nightly. Life time use expected.  Has experienced improved daytime fatigue.  Avoid excessive alcohol, smoking and overuse of sedatives at bedtime.  Weight management discussed. Goal BMI <30.  Adjust sleep position PRN.     Assessment & Plan:  Patient is well  controlled.      5. Vitamin D deficiency  Overview:  Patient instructed to continue with OTC Vitamin D3  2,000 units daily.  Will continue to monitor.    Assessment & Plan:  Most recent Vitamin D 35.3 ng/mL on 12/04/2023.  Patient is at goal.      6. Class 3 severe obesity due to excess calories with serious comorbidity and body mass index (BMI) of 60.0 to 69.9 in adult  Overview:  Body mass index is 64.06 kg/m².  Goal BMI <30.  Exercise 5 times a week for 30 minutes per day.  Avoid soda, simple sugars, excessive rice, potatoes or bread. Limit fast foods and fried foods.  Choose complex carbs in moderation (example: green vegetables, beans, oatmeal). Eat plenty of fresh fruits and vegetables with lean meats daily.  Do not skip meals. Eat a balanced portion size.  Avoid fad diets. Consider permanent healthy life style changes.       7. Pre-op exam  Comments:  Patient is medically cleared for weight loss surgery from my perspective however he needs to get cardiac clearance before I sign off on this operation    8. Need for vaccination  -     diptheria-tetanus toxoids 2-2 Lf unit/0.5 mL injection 0.5 mL        [x] Discussed lab findings with the patient.  [x] Discussed diet, exercise and if appropriate, weight loss.  [x] Instructions and information, including risks and benefits of prescribed medication(s) have been reviewed with the patient and patient verbalizes understanding. Questions have been answered to the patient's satisfaction.  [] Appropriate counseling has been given regarding anxiety and depressive issues that were discussed today.  [] Any lab drawn today will be reviewed by physician at the time it is received and appropriate recommendations bill be made and discussed with patient.     Follow up in about 6 months (around 7/3/2024) for Follow Up.   In addition to their scheduled follow up, the patient has also been instructed to follow up on as needed basis.     Future Appointments   Date Time Provider  Department Center   7/1/2024  9:00 AM Alex Calvillo Sr., MD [facility]        Alex Calvillo Sr, MD

## 2024-01-10 ENCOUNTER — TELEPHONE (OUTPATIENT)
Dept: BARIATRICS | Facility: CLINIC | Age: 54
End: 2024-01-10
Payer: COMMERCIAL

## 2024-01-12 ENCOUNTER — TELEPHONE (OUTPATIENT)
Dept: FAMILY MEDICINE | Facility: CLINIC | Age: 54
End: 2024-01-12
Payer: COMMERCIAL

## 2024-01-12 ENCOUNTER — TELEPHONE (OUTPATIENT)
Dept: BARIATRICS | Facility: CLINIC | Age: 54
End: 2024-01-12
Payer: COMMERCIAL

## 2024-01-12 DIAGNOSIS — Z01.818 PRE-OP EXAM: ICD-10-CM

## 2024-01-12 DIAGNOSIS — Z01.810 PRE-OPERATIVE CARDIOVASCULAR EXAMINATION, HIGH RISK SURGERY: Primary | ICD-10-CM

## 2024-01-12 DIAGNOSIS — Z01.812 PRE-OPERATIVE LABORATORY EXAMINATION: Primary | ICD-10-CM

## 2024-01-12 RX ORDER — RANOLAZINE 1000 MG/1
1000 TABLET, EXTENDED RELEASE ORAL 2 TIMES DAILY
COMMUNITY

## 2024-01-12 RX ORDER — AMLODIPINE AND BENAZEPRIL HYDROCHLORIDE 5; 40 MG/1; MG/1
1 CAPSULE ORAL 2 TIMES DAILY
COMMUNITY
Start: 2023-12-20

## 2024-01-12 RX ORDER — ACETAMINOPHEN 500 MG
TABLET ORAL
COMMUNITY

## 2024-01-12 NOTE — TELEPHONE ENCOUNTER
Spoke with patient in regards to where he would like to do the xray and labs and he said [facility]. So I went ahead and sent the xray order there.   The lab orders were sent there as well.

## 2024-01-12 NOTE — TELEPHONE ENCOUNTER
----- Message from Alex Calvillo Sr., MD sent at 1/10/2024 12:34 PM CST -----  Regarding: Results  Please have patient come in and draw the requested lab    1.  ----- Message -----  From: Marcela Metz RN  Sent: 1/10/2024  11:49 AM CST  To: Alex Calvillo Sr., MD

## 2024-01-17 ENCOUNTER — TELEPHONE (OUTPATIENT)
Dept: FAMILY MEDICINE | Facility: CLINIC | Age: 54
End: 2024-01-17
Payer: COMMERCIAL

## 2024-02-08 ENCOUNTER — HOSPITAL ENCOUNTER (OUTPATIENT)
Dept: RADIOLOGY | Facility: HOSPITAL | Age: 54
Discharge: HOME | End: 2024-02-08
Attending: FAMILY MEDICINE
Payer: COMMERCIAL

## 2024-02-08 DIAGNOSIS — Z01.810 PRE-OPERATIVE CARDIOVASCULAR EXAMINATION, HIGH RISK SURGERY: ICD-10-CM

## 2024-02-08 PROCEDURE — 71046 X-RAY EXAM CHEST 2 VIEWS: CPT | Mod: TC

## 2024-02-21 ENCOUNTER — TELEPHONE (OUTPATIENT)
Dept: BARIATRICS | Facility: CLINIC | Age: 54
End: 2024-02-21
Payer: COMMERCIAL

## 2024-02-21 DIAGNOSIS — I10 HYPERTENSION, UNSPECIFIED TYPE: ICD-10-CM

## 2024-02-21 DIAGNOSIS — E66.01 MORBID OBESITY: ICD-10-CM

## 2024-02-28 ENCOUNTER — TELEPHONE (OUTPATIENT)
Dept: BARIATRICS | Facility: CLINIC | Age: 54
End: 2024-02-28
Payer: COMMERCIAL

## 2024-03-19 ENCOUNTER — TELEPHONE (OUTPATIENT)
Dept: BARIATRICS | Facility: CLINIC | Age: 54
End: 2024-03-19
Payer: COMMERCIAL

## 2024-03-19 ENCOUNTER — PATIENT MESSAGE (OUTPATIENT)
Dept: BARIATRICS | Facility: CLINIC | Age: 54
End: 2024-03-19
Payer: COMMERCIAL

## 2024-03-19 NOTE — TELEPHONE ENCOUNTER
"VASYL patient on bariatric work queue list, 3/18/24 pt spoke with FS noting:    "Note text:Patient was placed on financial hold in January due to finances. Patient is ready to move forward.    Patient contacted to discuss and advise of accumulations as listed. Reviewed and verified patient demographics. Advised patient of $1500 responsibility due for evaluation if proceeding with initial consult. Patient agrees and understands balances are collected via telephone 7 days prior to scheduled arrival."  "

## 2024-04-01 ENCOUNTER — TELEPHONE (OUTPATIENT)
Dept: FAMILY MEDICINE | Facility: CLINIC | Age: 54
End: 2024-04-01
Payer: COMMERCIAL

## 2024-04-01 NOTE — TELEPHONE ENCOUNTER
Hayden notified that we did not receive any forms as of today regarding following him after his Sx. He will request for them to resend  them. I spoke with Dr Calvillo regarding ,the medical doctor from [place](patient doesn't know name of MD) wants PCP to follow patient after Sx ,Dr Calvillo said on one condition, After his Sx they need to have the first follow up visit with him in [place] then he can follow up here with Dr Calvillo. Hayden verbalized an understanding.

## 2024-04-01 NOTE — TELEPHONE ENCOUNTER
----- Message from Christina Larios sent at 3/18/2024  4:46 PM CDT -----  Hayden called.  His weight loss surgery will possibly be scheduled in June.  The PCP agreement to follow form needs to be faxed [phone].

## 2024-04-08 PROBLEM — Z00.00 ANNUAL PHYSICAL EXAM: Status: RESOLVED | Noted: 2024-01-03 | Resolved: 2024-04-08

## 2024-04-24 ENCOUNTER — TELEPHONE (OUTPATIENT)
Dept: FAMILY MEDICINE | Facility: CLINIC | Age: 54
End: 2024-04-24
Payer: COMMERCIAL

## 2024-04-24 NOTE — TELEPHONE ENCOUNTER
----- Message from Yesica Hudson MD sent at 4/22/2024  7:48 AM CDT -----  Please inform the patient that his chest xray is normal. No need for further testing. Continue as planned with surgery.

## 2024-05-29 ENCOUNTER — TELEPHONE (OUTPATIENT)
Dept: BARIATRICS | Facility: CLINIC | Age: 54
End: 2024-05-29
Payer: COMMERCIAL

## 2024-06-03 ENCOUNTER — TELEPHONE (OUTPATIENT)
Dept: BARIATRICS | Facility: CLINIC | Age: 54
End: 2024-06-03
Payer: COMMERCIAL

## 2024-06-03 NOTE — TELEPHONE ENCOUNTER
Spoke w/ pt re money is the problem. He is going to call rachel to do a payment plan. Will move appts. 2 m out per pt.

## 2024-07-16 ENCOUNTER — PATIENT MESSAGE (OUTPATIENT)
Dept: BARIATRICS | Facility: CLINIC | Age: 54
End: 2024-07-16
Payer: COMMERCIAL

## 2024-08-02 ENCOUNTER — TELEPHONE (OUTPATIENT)
Dept: BARIATRICS | Facility: CLINIC | Age: 54
End: 2024-08-02
Payer: COMMERCIAL

## 2024-08-02 NOTE — TELEPHONE ENCOUNTER
SPOKE TO PT. PT IS HAVING A HARD TIME GETTING THE OOP MONEY TOGETHER. HE WILL BE PUT ON $ HOLD. I WILL LET TOMASZ KNOW. HE WILL CALL US WHEN HE IS READY.

## 2024-08-05 PROBLEM — G47.33 OSA TREATED WITH BIPAP: Status: ACTIVE | Noted: 2024-01-03

## 2024-08-05 PROBLEM — F41.9 ANXIETY: Status: ACTIVE | Noted: 2024-08-05

## 2024-08-05 PROBLEM — E11.9 DM (DIABETES MELLITUS): Status: ACTIVE | Noted: 2024-08-05

## 2024-08-05 PROBLEM — Z12.11 SCREENING FOR COLON CANCER: Status: ACTIVE | Noted: 2022-10-17

## 2024-08-06 ENCOUNTER — OFFICE VISIT (OUTPATIENT)
Dept: FAMILY MEDICINE | Facility: CLINIC | Age: 54
End: 2024-08-06
Payer: COMMERCIAL

## 2024-08-06 VITALS
SYSTOLIC BLOOD PRESSURE: 138 MMHG | BODY MASS INDEX: 49.44 KG/M2 | OXYGEN SATURATION: 98 % | HEART RATE: 73 BPM | WEIGHT: 315 LBS | RESPIRATION RATE: 16 BRPM | DIASTOLIC BLOOD PRESSURE: 78 MMHG | HEIGHT: 67 IN

## 2024-08-06 DIAGNOSIS — E11.9 TYPE 2 DIABETES MELLITUS WITHOUT COMPLICATION, WITHOUT LONG-TERM CURRENT USE OF INSULIN: ICD-10-CM

## 2024-08-06 DIAGNOSIS — E03.9 ACQUIRED HYPOTHYROIDISM: ICD-10-CM

## 2024-08-06 DIAGNOSIS — I10 PRIMARY HYPERTENSION: Primary | ICD-10-CM

## 2024-08-06 DIAGNOSIS — E66.01 CLASS 3 SEVERE OBESITY DUE TO EXCESS CALORIES WITH SERIOUS COMORBIDITY AND BODY MASS INDEX (BMI) OF 60.0 TO 69.9 IN ADULT: ICD-10-CM

## 2024-08-06 DIAGNOSIS — E78.2 MIXED HYPERLIPIDEMIA: ICD-10-CM

## 2024-08-06 PROCEDURE — 99214 OFFICE O/P EST MOD 30 MIN: CPT | Mod: COE,,, | Performed by: NURSE PRACTITIONER

## 2024-08-06 RX ORDER — AMLODIPINE AND BENAZEPRIL HYDROCHLORIDE 5; 40 MG/1; MG/1
1 CAPSULE ORAL 2 TIMES DAILY
Qty: 180 CAPSULE | Refills: 3 | Status: SHIPPED | OUTPATIENT
Start: 2024-08-06

## 2024-08-06 RX ORDER — LEVOTHYROXINE SODIUM 125 UG/1
125 TABLET ORAL
Qty: 90 TABLET | Refills: 3 | Status: SHIPPED | OUTPATIENT
Start: 2024-08-06

## 2024-08-06 RX ORDER — ASPIRIN 81 MG/1
81 TABLET ORAL DAILY
Qty: 90 TABLET | Refills: 3 | Status: SHIPPED | OUTPATIENT
Start: 2024-08-06

## 2024-08-06 RX ORDER — SEMAGLUTIDE 0.68 MG/ML
0.5 INJECTION, SOLUTION SUBCUTANEOUS
Qty: 3 ML | Refills: 3 | Status: SHIPPED | OUTPATIENT
Start: 2024-08-06

## 2024-08-06 RX ORDER — HYDROCHLOROTHIAZIDE 25 MG/1
25 TABLET ORAL DAILY
Qty: 90 TABLET | Refills: 3 | Status: SHIPPED | OUTPATIENT
Start: 2024-08-06

## 2024-09-05 ENCOUNTER — TELEPHONE (OUTPATIENT)
Dept: BARIATRICS | Facility: CLINIC | Age: 54
End: 2024-09-05
Payer: COMMERCIAL

## 2024-09-05 ENCOUNTER — PATIENT MESSAGE (OUTPATIENT)
Dept: BARIATRICS | Facility: CLINIC | Age: 54
End: 2024-09-05
Payer: COMMERCIAL

## 2024-09-05 NOTE — TELEPHONE ENCOUNTER
Spoke w/ pt. RE consult date agreed upon for 11/6.  Arriving on 11/5  Leaving on 11/7  Tentative sgy date 12/5  I instructed them about the need of an IPAD, lap top, or desk top to complete the virtual phone call for the psych test. I also told them their appointments would start to show up in the my chart megan. I explained to them that psych would call the day before to remind them and if their appointment was on a Monday it would be on a Friday that they would get the reminder call for the 300 question test that would last about an hour to and hour and a half. I asked them to keep me informed of appointments/orders being done with the date so I can stay on top of it. I also explained the importance of the PCP agreement needing a signature before consult. Pt verbalized understanding of all info given. I will start the consult process.

## 2024-09-06 ENCOUNTER — PATIENT MESSAGE (OUTPATIENT)
Dept: BARIATRICS | Facility: CLINIC | Age: 54
End: 2024-09-06
Payer: COMMERCIAL

## 2024-09-09 DIAGNOSIS — E66.01 MORBID OBESITY: ICD-10-CM

## 2024-09-13 DIAGNOSIS — Z01.818 PRE-OP EXAM: Primary | ICD-10-CM

## 2024-09-20 ENCOUNTER — DOCUMENTATION ONLY (OUTPATIENT)
Dept: FAMILY MEDICINE | Facility: CLINIC | Age: 54
End: 2024-09-20
Payer: COMMERCIAL

## 2024-09-25 LAB
ALBUMIN SERPL-MCNC: 4.3 G/DL (ref 3.8–4.9)
ALP SERPL-CCNC: 62 IU/L (ref 44–121)
ALT SERPL-CCNC: 14 IU/L (ref 0–44)
AST SERPL-CCNC: 13 IU/L (ref 0–40)
BILIRUB SERPL-MCNC: 0.4 MG/DL (ref 0–1.2)
BUN SERPL-MCNC: 15 MG/DL (ref 6–24)
BUN/CREAT SERPL: 13 (ref 9–20)
CALCIUM SERPL-MCNC: 9 MG/DL (ref 8.7–10.2)
CHLORIDE SERPL-SCNC: 101 MMOL/L (ref 96–106)
CO2 SERPL-SCNC: 20 MMOL/L (ref 20–29)
CREAT SERPL-MCNC: 1.13 MG/DL (ref 0.76–1.27)
EST. GFR  (NO RACE VARIABLE): 78 ML/MIN/1.73
FT4I SERPL CALC-MCNC: 2.6 (ref 1.2–4.9)
GLOBULIN SER CALC-MCNC: 2.5 G/DL (ref 1.5–4.5)
GLUCOSE SERPL-MCNC: 86 MG/DL (ref 70–99)
INR PPP: 1 (ref 0.9–1.2)
MAGNESIUM SERPL-MCNC: 2.1 MG/DL (ref 1.6–2.3)
PHOSPHATE SERPL-MCNC: 4.2 MG/DL (ref 2.8–4.1)
POTASSIUM SERPL-SCNC: 4.8 MMOL/L (ref 3.5–5.2)
PROT SERPL-MCNC: 6.8 G/DL (ref 6–8.5)
PROTHROMBIN TIME: 10.4 SEC (ref 9.1–12)
SODIUM SERPL-SCNC: 140 MMOL/L (ref 134–144)
T3RU NFR SERPL: 32 % (ref 24–39)
T4 FREE SERPL-MCNC: 1.29 NG/DL (ref 0.82–1.77)
T4 SERPL-MCNC: 8 UG/DL (ref 4.5–12)
VIT B1 BLD-SCNC: 105.1 NMOL/L (ref 66.5–200)
VIT B12 SERPL-MCNC: 439 PG/ML (ref 232–1245)

## 2024-09-27 ENCOUNTER — TELEPHONE (OUTPATIENT)
Dept: FAMILY MEDICINE | Facility: CLINIC | Age: 54
End: 2024-09-27
Payer: COMMERCIAL

## 2024-09-27 NOTE — TELEPHONE ENCOUNTER
----- Message from Maria C Priest sent at 8/29/2024  2:44 PM CDT -----  He has not been able to get the Ozempic, it keeps getting denied.  Is there anything else?  [facility] is [facility] pharmacy and please let him know.

## 2024-09-27 NOTE — TELEPHONE ENCOUNTER
Called the Appeals Dept and requested an Appeal for the Ozempic. The criteria is if he has tried and failed on previous medication, and if he has a A1C greater then 6.5%or a fasting glucose of 126mg/dl. I resubmitted lab results and the office note from 8/6/2024. To Appeals Department [phone]   . Ref # [id number].    I called Hayden an notified him of the appeal.he verbalized an understanding.

## 2024-10-23 ENCOUNTER — CLINICAL SUPPORT (OUTPATIENT)
Dept: PSYCHIATRY | Facility: CLINIC | Age: 54
End: 2024-10-23
Payer: COMMERCIAL

## 2024-10-23 DIAGNOSIS — Z00.8 ENCOUNTER FOR PRE-SURGICAL PSYCHOLOGICAL ASSESSMENT: Primary | ICD-10-CM

## 2024-11-06 ENCOUNTER — HOSPITAL ENCOUNTER (OUTPATIENT)
Dept: CARDIOLOGY | Facility: CLINIC | Age: 54
Discharge: HOME | End: 2024-11-06
Payer: COMMERCIAL

## 2024-11-06 ENCOUNTER — OFFICE VISIT (OUTPATIENT)
Dept: INTERNAL MEDICINE | Facility: CLINIC | Age: 54
End: 2024-11-06
Payer: COMMERCIAL

## 2024-11-06 ENCOUNTER — LAB VISIT (OUTPATIENT)
Dept: LAB | Facility: HOSPITAL | Age: 54
End: 2024-11-06
Attending: SURGERY
Payer: COMMERCIAL

## 2024-11-06 ENCOUNTER — CLINICAL SUPPORT (OUTPATIENT)
Dept: BARIATRICS | Facility: CLINIC | Age: 54
End: 2024-11-06
Payer: COMMERCIAL

## 2024-11-06 ENCOUNTER — OFFICE VISIT (OUTPATIENT)
Dept: PSYCHIATRY | Facility: CLINIC | Age: 54
End: 2024-11-06
Payer: COMMERCIAL

## 2024-11-06 ENCOUNTER — OFFICE VISIT (OUTPATIENT)
Dept: BARIATRICS | Facility: CLINIC | Age: 54
End: 2024-11-06
Payer: COMMERCIAL

## 2024-11-06 VITALS
HEIGHT: 67 IN | WEIGHT: 315 LBS | RESPIRATION RATE: 18 BRPM | BODY MASS INDEX: 49.44 KG/M2 | SYSTOLIC BLOOD PRESSURE: 134 MMHG | DIASTOLIC BLOOD PRESSURE: 71 MMHG | HEART RATE: 78 BPM | TEMPERATURE: 98 F | OXYGEN SATURATION: 97 %

## 2024-11-06 VITALS
OXYGEN SATURATION: 96 % | WEIGHT: 315 LBS | BODY MASS INDEX: 66.43 KG/M2 | HEART RATE: 76 BPM | SYSTOLIC BLOOD PRESSURE: 166 MMHG | DIASTOLIC BLOOD PRESSURE: 65 MMHG

## 2024-11-06 DIAGNOSIS — E66.01 MORBID OBESITY: ICD-10-CM

## 2024-11-06 DIAGNOSIS — Z01.818 PREOP EXAMINATION: Primary | ICD-10-CM

## 2024-11-06 DIAGNOSIS — S02.609D CLOSED FRACTURE OF JAW WITH ROUTINE HEALING, SUBSEQUENT ENCOUNTER: ICD-10-CM

## 2024-11-06 DIAGNOSIS — Z12.11 SCREENING FOR COLON CANCER: ICD-10-CM

## 2024-11-06 DIAGNOSIS — Z01.818 PREOPERATIVE EVALUATION TO RULE OUT SURGICAL CONTRAINDICATION: Primary | ICD-10-CM

## 2024-11-06 DIAGNOSIS — Z86.16 HISTORY OF COVID-19: ICD-10-CM

## 2024-11-06 DIAGNOSIS — I10 HYPERTENSION, UNSPECIFIED TYPE: ICD-10-CM

## 2024-11-06 DIAGNOSIS — E78.2 MIXED HYPERLIPIDEMIA: ICD-10-CM

## 2024-11-06 DIAGNOSIS — G47.33 OSA TREATED WITH BIPAP: ICD-10-CM

## 2024-11-06 DIAGNOSIS — E03.9 ACQUIRED HYPOTHYROIDISM: ICD-10-CM

## 2024-11-06 DIAGNOSIS — I10 PRIMARY HYPERTENSION: ICD-10-CM

## 2024-11-06 DIAGNOSIS — Z71.3 DIETARY COUNSELING AND SURVEILLANCE: Primary | ICD-10-CM

## 2024-11-06 DIAGNOSIS — I25.10 CORONARY ARTERY DISEASE INVOLVING NATIVE CORONARY ARTERY OF NATIVE HEART WITHOUT ANGINA PECTORIS: ICD-10-CM

## 2024-11-06 DIAGNOSIS — F41.9 ANXIETY: ICD-10-CM

## 2024-11-06 DIAGNOSIS — E55.9 VITAMIN D DEFICIENCY: ICD-10-CM

## 2024-11-06 DIAGNOSIS — Z86.39 HISTORY OF TYPE 2 DIABETES MELLITUS: ICD-10-CM

## 2024-11-06 DIAGNOSIS — R60.9 EDEMA, UNSPECIFIED TYPE: ICD-10-CM

## 2024-11-06 DIAGNOSIS — I10 HYPERTENSION, UNSPECIFIED TYPE: Primary | ICD-10-CM

## 2024-11-06 PROBLEM — E11.9 DM (DIABETES MELLITUS): Status: RESOLVED | Noted: 2024-08-05 | Resolved: 2024-11-06

## 2024-11-06 PROBLEM — S02.609A JAW FRACTURE: Status: ACTIVE | Noted: 2024-11-06

## 2024-11-06 LAB
25(OH)D3+25(OH)D2 SERPL-MCNC: 38 NG/ML (ref 30–96)
ALBUMIN SERPL BCP-MCNC: 3.8 G/DL (ref 3.5–5.2)
ALP SERPL-CCNC: 59 U/L (ref 40–150)
ALT SERPL W/O P-5'-P-CCNC: 14 U/L (ref 10–44)
ANION GAP SERPL CALC-SCNC: 9 MMOL/L (ref 8–16)
AST SERPL-CCNC: 14 U/L (ref 10–40)
BASOPHILS # BLD AUTO: 0.09 K/UL (ref 0–0.2)
BASOPHILS NFR BLD: 1 % (ref 0–1.9)
BILIRUB DIRECT SERPL-MCNC: 0.2 MG/DL (ref 0.1–0.3)
BILIRUB SERPL-MCNC: 0.5 MG/DL (ref 0.1–1)
BUN SERPL-MCNC: 14 MG/DL (ref 6–20)
CALCIUM SERPL-MCNC: 9.4 MG/DL (ref 8.7–10.5)
CHLORIDE SERPL-SCNC: 104 MMOL/L (ref 95–110)
CHOLEST SERPL-MCNC: 140 MG/DL (ref 120–199)
CHOLEST/HDLC SERPL: 3.2 {RATIO} (ref 2–5)
CO2 SERPL-SCNC: 27 MMOL/L (ref 23–29)
CREAT SERPL-MCNC: 1.2 MG/DL (ref 0.5–1.4)
DIFFERENTIAL METHOD BLD: ABNORMAL
EOSINOPHIL # BLD AUTO: 0.4 K/UL (ref 0–0.5)
EOSINOPHIL NFR BLD: 4.8 % (ref 0–8)
ERYTHROCYTE [DISTWIDTH] IN BLOOD BY AUTOMATED COUNT: 13.6 % (ref 11.5–14.5)
EST. GFR  (NO RACE VARIABLE): >60 ML/MIN/1.73 M^2
ESTIMATED AVG GLUCOSE: 108 MG/DL (ref 68–131)
FOLATE SERPL-MCNC: 7.5 NG/ML (ref 4–24)
GLUCOSE SERPL-MCNC: 95 MG/DL (ref 70–110)
HBA1C MFR BLD: 5.4 % (ref 4–5.6)
HCT VFR BLD AUTO: 43.8 % (ref 40–54)
HDLC SERPL-MCNC: 44 MG/DL (ref 40–75)
HDLC SERPL: 31.4 % (ref 20–50)
HGB BLD-MCNC: 14.4 G/DL (ref 14–18)
IMM GRANULOCYTES # BLD AUTO: 0.04 K/UL (ref 0–0.04)
IMM GRANULOCYTES NFR BLD AUTO: 0.5 % (ref 0–0.5)
IRON SERPL-MCNC: 107 UG/DL (ref 45–160)
LDLC SERPL CALC-MCNC: 70.2 MG/DL (ref 63–159)
LYMPHOCYTES # BLD AUTO: 2.3 K/UL (ref 1–4.8)
LYMPHOCYTES NFR BLD: 25.8 % (ref 18–48)
MCH RBC QN AUTO: 31.1 PG (ref 27–31)
MCHC RBC AUTO-ENTMCNC: 32.9 G/DL (ref 32–36)
MCV RBC AUTO: 95 FL (ref 82–98)
MONOCYTES # BLD AUTO: 1.2 K/UL (ref 0.3–1)
MONOCYTES NFR BLD: 13.2 % (ref 4–15)
NEUTROPHILS # BLD AUTO: 4.9 K/UL (ref 1.8–7.7)
NEUTROPHILS NFR BLD: 54.7 % (ref 38–73)
NONHDLC SERPL-MCNC: 96 MG/DL
NRBC BLD-RTO: 0 /100 WBC
OHS QRS DURATION: 80 MS
OHS QTC CALCULATION: 429 MS
PLATELET # BLD AUTO: 350 K/UL (ref 150–450)
PMV BLD AUTO: 9.4 FL (ref 9.2–12.9)
POTASSIUM SERPL-SCNC: 4 MMOL/L (ref 3.5–5.1)
PROT SERPL-MCNC: 7.4 G/DL (ref 6–8.4)
RBC # BLD AUTO: 4.63 M/UL (ref 4.6–6.2)
SATURATED IRON: 27 % (ref 20–50)
SODIUM SERPL-SCNC: 140 MMOL/L (ref 136–145)
TOTAL IRON BINDING CAPACITY: 403 UG/DL (ref 250–450)
TRANSFERRIN SERPL-MCNC: 272 MG/DL (ref 200–375)
TRIGL SERPL-MCNC: 129 MG/DL (ref 30–150)
TSH SERPL DL<=0.005 MIU/L-ACNC: 0.81 UIU/ML (ref 0.4–4)
WBC # BLD AUTO: 8.87 K/UL (ref 3.9–12.7)

## 2024-11-06 PROCEDURE — 99999 PR PBB SHADOW E&M-EST. PATIENT-LVL III: CPT | Mod: PBBFAC,COE,, | Performed by: SURGERY

## 2024-11-06 PROCEDURE — 99999 PR PBB SHADOW E&M-EST. PATIENT-LVL I: CPT | Mod: PBBFAC,COE,,

## 2024-11-06 PROCEDURE — 99215 OFFICE O/P EST HI 40 MIN: CPT | Mod: S$GLB,COE,, | Performed by: HOSPITALIST

## 2024-11-06 PROCEDURE — 99999 PR PBB SHADOW E&M-EST. PATIENT-LVL IV: CPT | Mod: PBBFAC,COE,, | Performed by: HOSPITALIST

## 2024-11-06 PROCEDURE — 36415 COLL VENOUS BLD VENIPUNCTURE: CPT | Performed by: SURGERY

## 2024-11-06 PROCEDURE — 80076 HEPATIC FUNCTION PANEL: CPT | Performed by: SURGERY

## 2024-11-06 PROCEDURE — 93010 ELECTROCARDIOGRAM REPORT: CPT | Mod: S$GLB,COE,, | Performed by: INTERNAL MEDICINE

## 2024-11-06 PROCEDURE — 83036 HEMOGLOBIN GLYCOSYLATED A1C: CPT | Performed by: SURGERY

## 2024-11-06 PROCEDURE — 82306 VITAMIN D 25 HYDROXY: CPT | Performed by: SURGERY

## 2024-11-06 PROCEDURE — 93005 ELECTROCARDIOGRAM TRACING: CPT | Mod: S$GLB,COE,, | Performed by: SURGERY

## 2024-11-06 PROCEDURE — 80061 LIPID PANEL: CPT | Performed by: SURGERY

## 2024-11-06 PROCEDURE — 80048 BASIC METABOLIC PNL TOTAL CA: CPT | Performed by: SURGERY

## 2024-11-06 PROCEDURE — 85025 COMPLETE CBC W/AUTO DIFF WBC: CPT | Performed by: SURGERY

## 2024-11-06 PROCEDURE — 82746 ASSAY OF FOLIC ACID SERUM: CPT | Performed by: SURGERY

## 2024-11-06 PROCEDURE — 99999 PR PBB SHADOW E&M-EST. PATIENT-LVL I: CPT | Mod: PBBFAC,COE,, | Performed by: PSYCHOLOGIST

## 2024-11-06 PROCEDURE — 86677 HELICOBACTER PYLORI ANTIBODY: CPT | Performed by: SURGERY

## 2024-11-06 PROCEDURE — 99999 PR PBB SHADOW E&M-EST. PATIENT-LVL I: CPT | Mod: PBBFAC,COE,, | Performed by: DIETITIAN, REGISTERED

## 2024-11-06 PROCEDURE — 84443 ASSAY THYROID STIM HORMONE: CPT | Performed by: SURGERY

## 2024-11-06 PROCEDURE — 84466 ASSAY OF TRANSFERRIN: CPT | Performed by: SURGERY

## 2024-11-06 RX ORDER — ASPIRIN 81 MG/1
81 TABLET ORAL DAILY
COMMUNITY
Start: 2024-09-02

## 2024-11-06 NOTE — ASSESSMENT & PLAN NOTE
Hypertension amlodipine-benazepril hydrochlorothiazide    Hypertension-  Blood pressure is acceptable . I suggest continuation of -amlodipine------- during the entire perioperative period. I suggest holding ----benazepril, hydrochlorothiazide---- on the morning of the surgery and can continue that  post operatively under blood pressure, electrolyte and renal function monitoring as long as they are acceptable.I suggest addressing pain control as uncontrolled pain can increased blood pressure

## 2024-11-06 NOTE — HPI
History of present illness- I had the pleasure of meeting this pleasant 54 y.o. gentleman in the pre op clinic prior to his elective Abdominal surgery. The patient is new to me .    I have obtained the history by speaking to the patient and by reviewing the electronic health records.    Events leading up to surgery / History of presenting illness -    BMI 66.43     He has had longstanding weight problem ever since elementary  He had tried diet, exercise and semaglutide   He did not like the way semaglutide made him feel and he stopped taking   He is exercising 3 times a week about 20-30 minutes each time    No abdominal pain      Relevant health conditions of significance for the perioperative period/ History of presenting illness -    Hypertension  Hypothyroidism  Sleep apnea BiPAP  HLD  Coronary artery disease cardiac stents 2020 2022  RCAaspirin, Plavix   History COVID- 2023  Diabetes in the past    Lives with wife   2 story house  bedroom downstairs  Works as a  for [employer] and he works in the [employer] [facility]-he is a  and he comes home once in a 4 days  Retired   Pets- None  Children - Son and a daughter who are grown  Has help post op  He is having the surgery through the [facility]    Not known to have  Stroke/ Mini stroke , Lung problem,  Kidney problem, deep vein thrombosis, pulmonary embolism, acid reflux,fatty liver , tobacco smoking, edema, mental health problems , gout, allergies , constipation

## 2024-11-06 NOTE — ASSESSMENT & PLAN NOTE
Hypothyroidism levothyroxine  Suggested spacing Thyroxine replacement with food, other Medication    Hypothyroidism- I suggest continuation of synthroid replacement in the perioperative period

## 2024-11-06 NOTE — PROGRESS NOTES
"NUTRITIONAL CONSULT    Referring Physician: Darien Bai M.D.   Reason for MNT Referral: Initial assessment for Hugo-en-Y gastric bypass work-up    PAST MEDICAL HISTORY:   54 y.o. male    Weight history includes Highest adult weight 440 lbs at age [age]. Lowest adult weight 270 lbs at age [age]  Weight loss attempts include Medifast, Weight Watchers, compounded GLP-1, keto, meal prep service and exercise. PT lost most weight with meal prepping and exercise - 20 lbs.    PT states he has been using meal prep service and exercising for the past 7 months. He reports being able to increase physical activity gradually as stamina increases.    Past Medical History:   Diagnosis Date    Hypertension     Hypogonadism in male     Hypothyroidism associated with surgical procedure     Insulin resistance     Obesity,     QUEENIE (obstructive sleep apnea)     Sleep apnea     Vitamin D deficiency      CLINICAL DATA:  54 y.o.-year-old Black or  male.  Height: 5' 7"  Weight: 424 lbs  IBW: 154 lbs  BMI: 66.43  The patient's goal weight (50% EBW): 289 lbs  Personal goal weight: none stated    Goal for Bariatric Surgery: to improve health, to improve quality of life, to lose weight, and get off some medications    DAILY NUTRITIONAL NEEDS: pre-op nutritional bariatric guidelines to promote weight loss  3444-1450 Calories    Grams Protein    NUTRITION & HEALTH HISTORY:    Past diet recall:  Fast food    Current diet recall:     When on the road  Beet juice   B: Oatmeal, sausage  L: Fit & Fresh or Green Heart meal  S: Cheerios with almond milk, fruit, Greek yogurt, protein shake, crackers or protein bar  D: Pre-made salad with chicken or ham or grilled meat with vegetables    Current Diet:  Meal pattern: Regular  Protein supplements: 4/week Premier Protein, Fit Crunch  Snackin / day  Vegetables: Likes a few. Eats daily. Broccoli, mixed vegetables, squash  Fruits: Likes a few. Eats almost daily. Apples, bananas, " melons  Beverages: water and beet juice  Dining out/delivery:  2-3 x month  Mostly restaurants. [employer], steakhouse - meat and salad or steamed vegetables  Cooking at home:  4-5 x week  Mostly grilled/broiled and pan-fried/sauteed fish/seafood and chicken/turkey    Food Allergies:   NKFA    Vitamins / Minerals / Herbs:   Vit D  Super Beets  Metamucil  Super B complex    Labs:   Reviewed.    Exercise:  Treadmill when at home 3 x week, 20-30 mins  Yard work    Restrictions to Exercise: None.    Social:  Drives regionally, on the road 3 days/week .   Lives with wife.  Grocery shopping and food prep PT and wife.  Patient believes the household will be supportive after surgery.  Alcohol: Socially.  Smoking: None.    ASSESSMENT:  Patient reports attempts at weight loss, only to regain lost weight.  Patient demonstrated knowledge of healthy eating behaviors and exercise patterns; admits to not eating healthy and not exercising at this point.  Patient states willingness to change lifestyle and make behavior modifications     Barriers to Education: none    Stage of change: determination    NUTRITION DIAGNOSIS:    Morbid Obesity related to Excessive carbohydrate intake, Excessive calorie intake, and Physical inactivity as evidence by BMI.    BARIATRIC DIET DISCUSSION/PLAN:  Discussed diet after surgery and related to patient's food record.  Reviewed nutrition guidelines for before and after surgery.  Answered all questions.  Reviewed pre- and post-op liquid diet, vitamins, and minerals  Work on gradually cutting back on starchy CHO in the diet.  Cut back on juice    RECOMMENDATIONS:  Pt has been educated on the bariatric lifestyle  and may proceed with bariatric surgery    Communicated nutrition plan with bariatric team.    SESSION TIME:  60 minutes

## 2024-11-06 NOTE — ASSESSMENT & PLAN NOTE
Sleep apnea BiPAP  Sleep apnea   Uses /BIPAP .  Suggested bringing for hospital use .   Informed the risk of worsening sleep apnea in the perioperative period and suggest using BIPAP use any time in 24 hrs ( day or night )for planned sleep     I suggest  caution with usage of medication that can cause respiratory suppression in the perioperative period    Avoidance of  supine sleep, weight gain , alcoholic beverages , care with , sedative , CNS depressant use indicated  since all of these can worsen QUEENIE       I suggest  caution with usage of medication that can cause respiratory suppression in the perioperative period    Suggested not to drive, if feels sleepy

## 2024-11-06 NOTE — PROGRESS NOTES
PRESURGICAL PSYCHOLOGICAL EVALUATION - BARIATRICS  Psychiatry Initial Visit (PhD/PsyD)   Psychological Intake and Assessment    Site:  [facility]    CPT Codes:  MMPI ONLY:  75128 (1 hour): Psychiatric Diagnostic Evaluation  76186 (1 hour): Integration of patient data, interpretation of standardized test results and clinical data, clinical decision-making, treatment planning and report, and interactive feedback to the patient  22014 (1 hour): Psychological or neuropsychological test administration, with single automated instrument via electronic platform, with automated result only:  [place] Multiphasic Personality Inventory - 3  (MMPI-3)    NAME: Hayden Puckett  MRN: [id number]  : 1970    Date:2024    Referral source: Darien Bai Jr., MD    Clinical status of patient: Outpatient   Met With: Patient    Chief complaint/reason for encounter: Routine psychological evaluation prior to bariatric surgery.     Before this evaluation was initiated, the purposes and process of the assessment and the limits of confidentiality were discussed with the patient who expressed understanding of these issues and verbally consented to proceed with the evaluation.     Type of surgery sought:  RNY Bypass      History of present illness:   Mr. Puckett is a 54-year-old Black male who is pursuing bariatric surgery through [facility] to improve his health and quality of life. He has no history of significant psychological difficulties. He has never taken psychotropic medication, has never been hospitalized for psychiatric reasons and denied any history of suicidality.  He has begun making positive lifestyle changes in anticipation for surgery, with good benefit. The patient has a Body Mass Index of 66.43 kg/m²  as documented by the referring provider.    Mr. Puckett has struggled with weight since childhood. Factors that have contributed to his weight gain over the years include  everyday stress and life stressors. He endorsed a history of occasional emotional eating with stress as his primary trigger. He is working on developing the following skills to cope with his emotions more effectively: taking time out as needed,  taking deep breaths, and praying. He has tried many weight loss methods on his own with little success, and he believes that his biggest weight loss challenge is his work schedule and not having a enough time to be active.  His motivation for seeking surgery now is his family and self-improvements. His postsurgical goals include improved health.     Mr. Puckett has is scheduled to met with Ms. Reginald RD, bariatric dietician, and to learn more about healthy eating behaviors and exercise patterns. He must be  cleared by Ms. Montelongo to proceed with bariatric surgery.    Co-morbidities:  Hypertension, hyperlipidemia, QUEENIE on CPAP    Knowledge of surgery information: Mr. Puckett is scheduled to meet with providers to obtain more information about his surgery.   - Basics of procedure:   - Risks:   - Basics of diet:     Pain: 0/10    Current Psychiatric Symptoms:   Depression - Denied depressed mood, loss of interest in pleasurable activities, anhedonia, sleep changes, decreased motivation, decreased concentration, feelings of excessive or irrational guilt, helplessness, hopelessness, increased or decreased appetite, weight changes, increased or decreased motor activity, decreased energy, suicidal ideations/thoughts of death.  Ceci/Hypomania - Denied increased goal directed activity, decreased need for sleep, pressured speech or increased talkative, racing thoughts, increased risk-taking behavior, episodic elevated or irritable mood, flight of ideas, distractibility, inflated self-esteem, grandiosity  Anxiety - Denied excessive worry, difficulty controlling worrying, feeling keyed up, easily fatigued, difficulty concentrating or mind going blank, irritability, muscle tension, sleep  disturbance, racing thoughts, being unable to relax, specific phobia.  Panic Attacks: Denied palpitations, sweating, trembling, dyspnea, choking sensation, chest pain/discomfort, nausea, dizziness, chills or hot flashes, tingling, derealization, fear of losing control, fear of dying.  Thoughts - Denied any AVH, paranoia, delusions, ideas of reference, thought insertion or thought broadcasting  Suicidal thoughts/behaviors - denied passive or active SI, denied suicidal plans or intent  Self-injury - denied.  Substance abuse - denied abuse or dependence.   Sleep - Denied increased sleep latency, frequent nighttime awakenings, or early morning awakening with inability to return to sleep. Patient has sleep apnea and finds CPAP machine is helpful    Current psychiatric treatment:  Medications: Denied  Psychotherapy: Denied    Current Health Behaviors:  Compliant with medical regimens and appointments: Yes  Prescription medication misuse: Yes  Exercise: Yes, walks about 3x a week or more  Adequate cognitive functioning: Yes    Past Psychiatric history:   Previous diagnosis:  none  Previous psychiatric hospitalizations/inpatient treatment: none  History of outpatient treatment: none  Previous suicide attempt: none  Non-suicidal self-injury: none    Trauma history:  Son  approximately 4 years ago.     PTSD: Denies re-experiencing trauma, nightmares, increased awareness of surroundings, hyperexcitability.    History of eating disorders:  History of bulimia: Denies recurrent episodes of eating then engaging in inappropriate compensatory behaviors.        History of binge-eating episodes: Denies eating excessive amounts of food within a discrete time period with a lack of control during eating.  He denied eating more rapidly than normal, eating until uncomfortably full, eating large amounts of food when not physically hungry, eating alone due to embarrassment, or negative emotions (i.e., disgusted, guilty, depressed)  afterwards.    Family history of psychiatric illness: None reported.     Social history (marriage, employment, etc.): Mr. Hayden Puckett was born and raised in [place], [place] by his biological mother and grandmother along with 4 siblings.  He described her childhood as good and loving.  He did not report childhood trauma, abuse, and neglect. He completed 2 years of college. He is currently employed as .  He is not on disability and finances are stable . He has been  for about 30 years.  He has 3 children (ages 30, 22, one son ).  He currently lives with wife and daughter.  He identifies as Restorationist.    Current psychosocial stressors: work and being in traffic     Report of coping skills/recreational activities:  movie night with family, going for a walk, spending time with grandchildren, working on his car, landscaping, taking deep breaths     Support system: very close relationship with siblings, mother, wife, and friends    Substance use:   Alcohol: occasionally   Drugs: Denied current use; denied history of abuse or dependency.  Tobacco: Denied.   Caffeine: coffee once or twice a week      Current medications and drug reactions (include OTC, herbal): see medication list     PSYCHOLOGICAL ASSESSMENT/TESTING:   All tests were administered according to standardized procedures and were selected based on the reason for referral.  The MMPI-3 provides an assessment of personality and psychopathology with specific evaluation of psychosocial risk factors associated with outcomes of bariatric surgery.     Mr. Puckett's  test results indicate he attempted to place himself in an overly positive light by minimizing faults and denying psychological problems.  Of note, this profile is often seen in settings where testing is used to evaluate fitness for desired medical procedures.     TEST RESULTS. Scores on the MMPI-3 Validity Scales raise concerns about the possible impact of under-reporting on the  validity of this protocol. With that caution noted, scores on the Substantive Scales do not indicate somatic, cognitive, emotional, thought, or behavioral dysfunction in this protocol. Scores indicate Interpersonal difficulties relate to an excessive sense of self-importance. There are no specific treatment recommendations indicated by his profile.    GROUP COMPARISONS. Compared to other bariatric surgery candidates, Mr. Puckett  presented himself as remarkably well-adjusted. This reported level of psychological adjustment is rare in the general population.     FEEDBACK. Mr. Puckett was provided with test results, and offered the opportunity to respond to feedback and clarify results if needed. He stated that he is a problem solver and likes to help people - even strangers. He expressed that he has received feedback from others that he is helpful. He stated that even though his children are adults, he does as much as he can for them. Patient reported finding kavin in helping others, working on his cars/trucks and cleaning. He reported feeling confident because of his upbringing. Specifically, he conveyed that he saw that his grandmother, grandfather, and mother never gave up no matter what and he has adopted this same approach. Based on clinical interview and chart review, Mr. Puckett does not appear to experiences dysfunction or disorders related to excessive self-importance.     Mental Status Exam:   General Appearance:  age appropriate, well dressed, neatly groomed, overweight    Speech:  normal tone, normal rate, normal pitch, normal volume    Level of Cooperation:  cooperative    Thought Processes:  normal and logical    Mood:  euthymic    Thought Content:  normal, no suicidality, no homicidality, delusions, or paranoia    Affect:  congruent and appropriate    Orientation:  oriented x 4   Memory:  recent memory intact; appears intact  remote memory intact; able to recall remote personal events   Attention Span &  Concentration:  appropriate   Fund of General Knowledge:  appropriate for education    Abstract Reasoning:  Not directly assessed   Judgment & Insight:  good    Language  intact        SUMMARY AND RECOMMENDATIONS:  Mr. Puckett is a 54-year-old male referred for presurgical psychological evaluation prior to bariatric surgery. Results of personality testing should be considered in light of cautions noted earlier about possible threats to protocol validity, and they indicate that he is experiencing no acute psychiatric symptoms or declines in functioning at this time. Test results do not reveal any evidence that psychological difficulties would play a role in his recovery from surgery. Mr. Puckett's testing profile was largely consistent with his reports in the clinical interview. In the clinical interview, Mr. Puckett denied past psychiatric history and treatment.     There are no overt psychological contraindications for proceeding with bariatric surgery. Overall, Mr. Puckett is at LOW, risk for adverse postsurgical outcomes based on the following considerations:  There are no indications of disabling psychopathology, substance use/abuse, cognitive problems, or disabilities that would prevent understanding and competence with medical treatment.  There are no reports or major psychosocial stressors that would interfere with his adherence to treatment recommendations.  There is no evidence of suicidality.  He exhibits medium social stability and excellent social support.  She has good coping strategies to deal with stress and the demands of surgery and recovery.  He is scheduled to meet with providers to learn more about the surgical procedure, required dietary and lifestyle changes, and possible risks of this treatment option. He reports adequate compliance with prior medical regimens.    There are no recommendations for psychological intervention at this time.    Diagnosis:    ICD-10-CM ICD-9-CM   1. Preoperative  evaluation to rule out surgical contraindication  Z01.818 V72.83   2. Hypertension, unspecified type  I10 401.9   3. BMI 60.0-69.9, adult  Z68.44 V85.44   4. Morbid obesity  E66.01 278.01   5. Mixed hyperlipidemia  E78.2 272.2     Plan: This report will be sent to the referring provider with impressions and recommendations. It will be the referring team's decision whether the patient proceeds with surgery. Services related to the presurgical psychological evaluation are now concluded.     Evaluation Length (direct face-to-face time): 40 minutes  Total Time including report writing, test scoring, chart review, integration of data and feedback: 160 minutes    Ladi Carrillo, PhD  Clinical Psychologist

## 2024-11-06 NOTE — OUTPATIENT SUBJECTIVE & OBJECTIVE
"Outpatient Subjective & Objective     Chief complaint-Preoperative evaluation, Perioperative Medical management, complication reduction plan     Active cardiac conditions- none    Revised cardiac risk index predictors- none    Functional capacity -Examples of physical activity  can take 1 flight of stairs----- He can undertake all the above activities without  chest pain,chest tightness, Shortness of breath ,dizziness,lightheadedness making his exercise tolerance more,    than 4 Mets.        Review of Systems   Constitutional:  Negative for chills and fever.        Weight is stable   HENT:          Sleep apnea   Eyes:         No unusual vision changes   Respiratory:          No cough , phlegm    No Hemoptysis   Cardiovascular:         As noted   Gastrointestinal:         Bowels- Regular   No overt GI/ blood losses   Endocrine:        Prednisone use > 20 mg daily for 3 weeks- none   Genitourinary:  Negative for dysuria.        No urinary hesitancy    Musculoskeletal:            No unusual muscle/ joint pains   Skin:  Negative for rash.   Neurological:  Negative for syncope.        No unilateral weakness   Hematological:         Current use of Anticoagulants  None   Not known to have pituitary problem or low testosterone    No past medical history pertinent negatives.        No anesthesia, bleeding, cardiac problems , PONVwith previous surgeries/procedures.  Medications and Allergies reviewed in epic.     FH- No anesthesia,bleeding / venous thrombosis ,   in family      Physical Exam  Blood pressure 134/71, pulse 78, temperature 98.3 °F (36.8 °C), temperature source Oral, resp. rate 18, height 5' 7" (1.702 m), weight (!) 192.1 kg (423 lb 8.1 oz), SpO2 97%.      Physical Exam  Constitutional- Vitals - Body mass index is 66.33 kg/m².,   Vitals:    11/06/24 1510   BP: 134/71   Pulse: 78   Resp: 18   Temp: 98.3 °F (36.8 °C)     General appearance-Conscious,Coherent  Eyes- No conjunctival icterus,pupils  round  and " reactive to light   ENT-Oral cavity- moist    , Hearing grossly normal   Neck- No thyromegaly ,Trachea -central, No jugular venous distension,   No Carotid Bruit   Cardiovascular -Heart Sounds- Normal  and  no murmur   , No gallop rhythm   Respiratory - Normal Respiratory Effort, Normal breath sounds,  no wheeze , and  no forced expiratory wheeze    Peripheral pitting pedal edema-- mild, no calf pain   Gastrointestinal -Soft abdomen, No palpable masses, Non Tender,Liver,Spleen not palpable. No-- free fluid and shifting dullness  Musculoskeletal- No finger Clubbing. Strength grossly normal   Lymphatic-No Palpable cervical, axillary,Inguinal lymphadenopathy   Psychiatric - normal effect,Orientation  Rt Dorsalis pedis pulses-palpable    Lt Dorsalis pedis pulses- palpable   Rt Posterior tibial pulses -palpable   Left posterior tibial pulses -palpable   Miscellaneous -  no renal bruit   Investigations  Lab and Imaging have been reviewed in epic.    Review of Medicine tests    EKG- I had independently reviewed the EKG from--today   It was reported to be showing     Normal sinus rhythm   ST and T wave abnormality, consider inferior ischemia   Abnormal ECG   When compared with ECG of 06-JUL-2022 03:52,   No significant change was found     No chest pain currently or in the past recently    I have compared the current EKG from the EKG from 2022 and the inferior changes are not use    Review of clinical lab tests:  Lab Results   Component Value Date    CREATININE 1.2 11/06/2024    HGB 14.4 11/06/2024     11/06/2024         Review of old records- Was done and information gathered regards to events leading to surgery and health conditions of significance in the perioperative period       Noted mildly elevated phosphorus from the past and he does not have any history of kidney problems or underactive parathyroid gland      Review of old records- Was done and information gathered regards to events leading to surgery and  health conditions of significance in the perioperative period.    Outpatient Subjective & Objective

## 2024-11-06 NOTE — ASSESSMENT & PLAN NOTE
Weight related conditions     Known to have     HTN  Type 2 Diabetes   Hyperlipidemia   Sleep apnea       Not troubled with / Not known to have       Gout   Acid reflux   Fatty liver       Encouraged maintaining healthy weight for improved health
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
## 2024-11-06 NOTE — PROGRESS NOTES
BARIATRIC NEW PATIENT EVALUATION    CHIEF COMPLAINT:   Morbid obesity, body mass index is 66.43 kg/m². and inability to lose weight.    HPI:  Hayden Puckett is a 54 y.o. morbidly obese male. His current body mass index is 66.43 kg/m². He has multiple associated comorbidities including essential hypertension and QUEENIE and CAD.  The patient has tried Weight Watchers, calorie counting, Exercise, and GLP1 .  The patient was most successful with low calorie and exercise with a weight loss of 20lbs.  His current exercise includes walking 3 times a week. He denies any history of eating disorder such as anorexia, bulimia, or taking laxatives for weight loss, and denies any addiction including illicit substances, alcohol, or gambling.  Patient states he has a good  support system.    He is currently employed as a  at [employer] .  He  denies a history of GERD.  The patient's goal is improve health.    PAST MEDICAL HISTORY:  Past Medical History:   Diagnosis Date    Coronary artery disease     Diabetes mellitus, type 2     Hyperlipidemia     Hypertension     Hypogonadism in male     Hypothyroidism associated with surgical procedure     Insulin resistance     Obesity,     QUEENIE (obstructive sleep apnea)     Sleep apnea     Vitamin D deficiency        PAST SURGICAL HISTORY:  Past Surgical History:   Procedure Laterality Date    COLONOSCOPY N/A 11/23/2022    Procedure: COLON;  Surgeon: Drake Farley MD;  Location: [facility] ENDOSCOPY;  Service: Gastroenterology;  Laterality: N/A;    LEFT HEART CATHETERIZATION  2022    MANDIBLE FRACTURE SURGERY      THYROID SURGERY      TONSILLECTOMY         FAMILY HISTORY:  Family History   Problem Relation Name Age of Onset    Hypertension Mother      Hypertension Father      Breast cancer Maternal Grandmother      Lung cancer Maternal Grandfather      Diabetes Mellitus Paternal Grandmother          SOCIAL HISTORY:  Social History     Socioeconomic History    Marital status:      Number of children: 3   Tobacco Use    Smoking status: Never    Smokeless tobacco: Never   Substance and Sexual Activity    Alcohol use: Yes     Comment: A few times in a year    Drug use: Never     Social Drivers of Health     Financial Resource Strain: Low Risk  (11/27/2023)    Overall Financial Resource Strain (CARDIA)     Difficulty of Paying Living Expenses: Not very hard   Food Insecurity: No Food Insecurity (11/27/2023)    Hunger Vital Sign     Worried About Running Out of Food in the Last Year: Never true     Ran Out of Food in the Last Year: Never true   Transportation Needs: No Transportation Needs (11/27/2023)    PRAPARE - Transportation     Lack of Transportation (Medical): No     Lack of Transportation (Non-Medical): No   Physical Activity: Insufficiently Active (11/27/2023)    Exercise Vital Sign     Days of Exercise per Week: 3 days     Minutes of Exercise per Session: 20 min   Stress: No Stress Concern Present (11/27/2023)    [employer] - Occupational Stress Questionnaire     Feeling of Stress : Not at all   Housing Stability: Low Risk  (11/27/2023)    Housing Stability Vital Sign     Unable to Pay for Housing in the Last Year: No     Number of Places Lived in the Last Year: 1     Unstable Housing in the Last Year: No       MEDICATIONS:  Medications have been reviewed.    ALLERGIES:  Allergies have been reviewed.    ROS    Vitals:    11/06/24 1308   BP: (!) 166/65   Pulse: 76   SpO2: 96%   Weight: (!) 192.4 kg (424 lb 2.6 oz)   PainSc: 0-No pain       Physical Exam    DIAGNOSIS:  1. Morbid obesity, body mass index is 66.43 kg/m². and inability to lose weight.  2. Co-morbidities: essential hypertension and QUEENIE and CAD    PLAN:  The patient is a good candidate for Bariatric Surgery. The patient is interested in laparoscopic aurelio-en-y gastric bypass. The surgery and post-op care was discussed in detail with the patient. All questions were answered.    The patient  understands that bariatric surgery is a tool to aid in weight loss and that they need to be committed to the diet and exercise post-operatively for successful weight loss. Discussed with patient that bariatric surgery is not the easy way out and that it will take plenty of dedication on the patient's part to be successful. Also discussed the possibility of weight regain if the patient strays from the diet guidelines or exercise requirements. Patient verbalized understanding and wishes to proceed with the work-up.      ORDERS:  1. Chest X-Ray and EKG  2. Psychological Consult, Bariatric Dietician Consult, Cardiac Clearance, and PCP Clearance  3. Bariatric Labs: Per orders.    PCP: Alex Calvillo Sr., MD  RTC: As scheduled.

## 2024-11-06 NOTE — ASSESSMENT & PLAN NOTE
Coronary artery disease cardiac stents 2020 2022  RCA aspirin, Plavix -aspirin Ranexa    CAD-symptoms leading to the cardiac stenting-burning in the throat area  No heart attack  These symptoms led for him to have the cardiac stenting in 2020  After which his insurance has changed and he went to see another heart doctor and required another stent in 2022  Dec - for recurrence of the similar symptoms that he in 2020    He would have chest discomfort upon walking    He is doing good from a heart standpoint with no recurrence of the symptoms that led to the cardiac stenting    Risk factors for heart disease    Blood pressure  Cholesterol  Diabetes  Never smoked tobacco  No heart disease in the family    Secondary prevention     Aspirin   Plavix    To his understanding, his heart doctor was approving of him to hold Plavix 1 week prior to surgery  Last day of use of Plavix prior to surgery is the day before Thanksgiving on 27th of November    ASA - with history of  Stenting.  I have discussed the  risk of stent thrombosis with interruption of Aspirin regimen .Risk ( bleeding ) ,  benefits about perioperative use of  ASA  discussed     He does workouts and he can take a flight of stairs-with no exertional symptoms suggesting that he is doing good from a heart standpoint and I suggested for him to stay on aspirin without any interruption    Dec 2022       Percutaneous transluminal coronary angioplasty of the posterior   descending artery    Percutaneous transluminal coronary angioplasty of the posterolateral   branch     1.  Successful balloon angioplasty of the posterior descending artery   2.  Successful balloon angioplasty of the posterolateral branch   3.  Nonobstructive left main, circumflex, and left anterior descending   arteries   4. Systemic hypertension   5.  Normal left ventricular systolic function   6.  Morbid obesity     Oct 2024

## 2024-11-06 NOTE — Clinical Note
There are no overt psychological contraindications for proceeding with bariatric surgery. Overall, Mr. Puckett is at LOW, risk for adverse postsurgical outcomes
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
## 2024-11-06 NOTE — ASSESSMENT & PLAN NOTE
Diagnosed with type 2 diabetes around the year 2016  He was given metformin that he took until 2021   Him losing 30 lb made his diabetes get better and he is no longer taking metformin since 2021  He took semaglutide between 2022 and 2023 which she since stopped    His A1c today is 5.4    He is not known to have diabetic complications like neuropathy, eye, kidney involvement

## 2024-11-06 NOTE — ASSESSMENT & PLAN NOTE
2023     Did not require hospitalization, intubation or supplemental oxygen use   Had been vaccinated   Recovered from COVID    COVID screening     No fever   No cough   No SOB  No sore throat   No loss of taste or smell   No muscle aches   No nausea, vomiting , diarrhea

## 2024-11-06 NOTE — ASSESSMENT & PLAN NOTE
Not known to have     Heart failure   Liver failure   Kidney failure     Edema- I suggested avoidance of added salt,avoidance of NSAID's, unless advised or ordered  and suggested Limb elevation and stocking use

## 2024-11-06 NOTE — ASSESSMENT & PLAN NOTE
He lost his son about 5 years ago  Currently not taking any mental medication    Doing fairly good from a mental health stand point   Not under psychiatry , Therapist care   Has supportive family     No Suicidal / Homicidal ideation

## 2024-11-06 NOTE — PROGRESS NOTES
I spoke with pt regarding abnormal labs and any medicine needed that would be called in to their pharmacy. We spoke to pt about surgery date of 12/5 pt will preop 12/4 pt will arrive 12/4 and return home 12/11. Pt needs to set up post op appointments w/ their pcp and get agreement signed if needed. Pts liquid diet date is 11/21. I instructed them that  dietary will be calling on 11/19 to go over liquid diet and nutrition booklet. I instructed them on the disability paperwork and how they need to do it and who they send it to. I also told them  we would be calling to go over pages 1-5 of their surgery booklet 1 week before their surgery and the rest of the booklet will be done at their preop visit. A folder full of disability paperwork,restaurant card,MBSAQIP info, and PCP agreement was given to pt. Along with the surgery/nutrition booklets/opioid handout. I instructed them that anything new that needed  to be scheduled to please do so in a timely manner. I asked that if their pcp agreement was not signed yet to please do so and get it back to us asap. If any new orders are needed we need the results also.  Pt verbalized understanding of all info given. I told them any questions or concerns could be sent my way via email or my numbers cell or office. I thanked them for choosing [facility] as a [facility] for their bariatric surgery and ended the nurse visit in my office.They have my cell and office number. I gave them the 24/7 number for emergencies along with my card with my phone numbers and fax on it. Pt agreed to the dates discussed and will move forward as soon as all notes are documented from each discipline.

## 2024-11-07 ENCOUNTER — TELEPHONE (OUTPATIENT)
Dept: BARIATRICS | Facility: CLINIC | Age: 54
End: 2024-11-07

## 2024-11-07 DIAGNOSIS — G47.33 OSA TREATED WITH BIPAP: ICD-10-CM

## 2024-11-07 DIAGNOSIS — I10 HYPERTENSION, UNSPECIFIED TYPE: ICD-10-CM

## 2024-11-07 DIAGNOSIS — E66.01 MORBID OBESITY: ICD-10-CM

## 2024-11-07 LAB — H PYLORI IGG SERPL QL IA: NEGATIVE

## 2024-11-07 NOTE — TELEPHONE ENCOUNTER
----- Message from KEITH Medrano sent at 11/7/2024  8:37 AM CST -----  Regarding: LIQUID DIET  2 week liquid diet starts 11/21  GBP surgery scheduled 12/5  preop appt scheduled 12/4  Schedule virtual 2 week pre op call on 11/19  Please place [facility] vitamin order.  Scheduled 1 week po video call on 12/11  Please Schedule 8 week po video call on TBD

## 2024-11-14 ENCOUNTER — TELEPHONE (OUTPATIENT)
Dept: FAMILY MEDICINE | Facility: CLINIC | Age: 54
End: 2024-11-14
Payer: COMMERCIAL

## 2024-11-14 NOTE — TELEPHONE ENCOUNTER
----- Message from Christina sent at 11/6/2024  1:01 PM CST -----  Please call Hayden concerning a post op PCP agreement form to follow patient after weight loss surgery.   [phone]

## 2024-11-26 ENCOUNTER — TELEPHONE (OUTPATIENT)
Dept: BARIATRICS | Facility: CLINIC | Age: 54
End: 2024-11-26
Payer: COMMERCIAL

## 2024-11-26 NOTE — TELEPHONE ENCOUNTER
We discussed in person at preop  pages 6-23 of the Obesity Wellness and Surgical Weight Loss booklet, paying particular attention to the highlighted areas. We went over the new handout regarding the new policy for the opioid crisis and the new protocol we put into effect involving the new way to prescribe pain medicine post operatively. I instructed them to  their preop medicine along with the pre surgery drink they need to get in the pharmacy after their appointment. I walked them thru their day of surgery as if I was the patient explaining it to them.I instructed them on the water protocol they will start to follow once in their room after PACU and once reaching 480z they would then start their protien shakes/water. We went over the goals of where they need to be at week one and two with the amount of protein and water each day they need to take in and why. I told them no antibiotics or procedures for 1 month. However, they can have a dental cleaning. We discussed the new pain med protocol, the meds they would be d/c on and the rule of anything smaller than the tip of an eraser will need to be crushed for 2 weeks after surgery. I encouraged them to take use of the program of their medicines being delivered at their bedside before d/c. I also warned them that the hospital could be full so they may stay in PACU for longer than 1-2 hours.I told them that they would be d/c in between lunch and dinner the next day after surgery. We reviewed the surgery center location,important numbers their family members need to have incase of an emergency. I Instructed pt and/or family member to call 911 in case of an emergency while at the [facility]. I got the contact phone numbers needed. We talked about caring for their abdominal wounds,preventing blood clots, notifying me of s/s of infection, SOB or difficulty breathing, chest pain, vomiting up or pooping blood or anything out of the ordinary, and lastly starting their  vitamins 1 week post op and bringing their  vitamins with them for their post op diet appointment. I gave them their post op appointment reminders, little medicine cups,and the pcp agreement to bring with them to each post op appointment with their PCP. I told them to make sure we get the ov notes and labs from each post op visit with their PCP. After reviewing the booklet together, all questions and concerns were addressed and answered. Please see my preop call note that I charted for sgy time, arrival time at the [facility], and pre sgy drink/med time. Patient verbalized understanding of the above information  and was instructed to contact me with any further questions or concerns before surgery.  HOLD PLAVIX TODAY UNTIL AFTER SGY

## 2024-11-27 ENCOUNTER — TELEPHONE (OUTPATIENT)
Dept: BARIATRICS | Facility: CLINIC | Age: 54
End: 2024-11-27
Payer: COMMERCIAL

## 2024-11-27 NOTE — TELEPHONE ENCOUNTER
----- Message from Elisha sent at 11/27/2024 11:05 AM CST -----  Regarding: medical leave forms  Contact: [phone]  Pt called regarding to see if Dr Amado had filled out all medical leave forms for patient     Please call back at [phone]  .

## 2024-12-03 ENCOUNTER — TELEPHONE (OUTPATIENT)
Dept: BARIATRICS | Facility: CLINIC | Age: 54
End: 2024-12-03
Payer: COMMERCIAL

## 2024-12-03 NOTE — TELEPHONE ENCOUNTER
We discussed in person at preop  pages 6-23 of the Obesity Wellness and Surgical Weight Loss booklet, paying particular attention to the highlighted areas. We went over the new handout regarding the new policy for the opioid crisis and the new protocol we put into effect involving the new way to prescribe pain medicine post operatively. I instructed them to  their preop medicine along with the pre surgery drink they need to get in the pharmacy after their appointment. I walked them thru their day of surgery as if I was the patient explaining it to them.I instructed them on the water protocol they will start to follow once in their room after PACU and once reaching 480z they would then start their protien shakes/water. We went over the goals of where they need to be at week one and two with the amount of protein and water each day they need to take in and why. I told them no antibiotics or procedures for 1 month. However, they can have a dental cleaning. We discussed the new pain med protocol, the meds they would be d/c on and the rule of anything smaller than the tip of an eraser will need to be crushed for 2 weeks after surgery. I encouraged them to take use of the program of their medicines being delivered at their bedside before d/c. I also warned them that the hospital could be full so they may stay in PACU for longer than 1-2 hours.I told them that they would be d/c in between lunch and dinner the next day after surgery. We reviewed the surgery center location,important numbers their family members need to have incase of an emergency. I Instructed pt and/or family member to call 911 in case of an emergency while at the [facility]. I got the contact phone numbers needed. We talked about caring for their abdominal wounds,preventing blood clots, notifying me of s/s of infection, SOB or difficulty breathing, chest pain, vomiting up or pooping blood or anything out of the ordinary, and lastly starting their  vitamins 1 week post op and bringing their  vitamins with them for their post op diet appointment. I gave them their post op appointment reminders, little medicine cups,and the pcp agreement to bring with them to each post op appointment with their PCP. I told them to make sure we get the ov notes and labs from each post op visit with their PCP. After reviewing the booklet together, all questions and concerns were addressed and answered. Please see my preop call note that I charted for sgy time, arrival time at the [facility], and pre sgy drink/med time. Patient verbalized understanding of the above information  and was instructed to contact me with any further questions or concerns before surgery.  Pt will call cards to see if he still needs to take his plavix. He has been on it way too long. Average time after a stent is no more than 3 mths. He will let me or the nurses know on 12/3/ or 12/4. I will call pt w/ time of surgery.

## 2024-12-04 ENCOUNTER — DOCUMENTATION ONLY (OUTPATIENT)
Dept: BARIATRICS | Facility: CLINIC | Age: 54
End: 2024-12-04

## 2024-12-04 ENCOUNTER — OFFICE VISIT (OUTPATIENT)
Dept: BARIATRICS | Facility: CLINIC | Age: 54
End: 2024-12-04
Payer: COMMERCIAL

## 2024-12-04 ENCOUNTER — ANESTHESIA EVENT (OUTPATIENT)
Dept: SURGERY | Facility: HOSPITAL | Age: 54
End: 2024-12-04
Payer: COMMERCIAL

## 2024-12-04 VITALS
WEIGHT: 315 LBS | OXYGEN SATURATION: 98 % | HEART RATE: 71 BPM | BODY MASS INDEX: 61.81 KG/M2 | DIASTOLIC BLOOD PRESSURE: 77 MMHG | SYSTOLIC BLOOD PRESSURE: 169 MMHG

## 2024-12-04 DIAGNOSIS — I25.10 CORONARY ARTERY DISEASE INVOLVING NATIVE CORONARY ARTERY OF NATIVE HEART WITHOUT ANGINA PECTORIS: ICD-10-CM

## 2024-12-04 DIAGNOSIS — I10 HYPERTENSION, UNSPECIFIED TYPE: ICD-10-CM

## 2024-12-04 DIAGNOSIS — G47.33 OSA TREATED WITH BIPAP: ICD-10-CM

## 2024-12-04 PROCEDURE — 99999 PR PBB SHADOW E&M-EST. PATIENT-LVL III: CPT | Mod: PBBFAC,COE,, | Performed by: SURGERY

## 2024-12-04 RX ORDER — SCOLOPAMINE TRANSDERMAL SYSTEM 1 MG/1
1 PATCH, EXTENDED RELEASE TRANSDERMAL ONCE
Status: CANCELLED | OUTPATIENT
Start: 2024-12-04 | End: 2024-12-04

## 2024-12-04 RX ORDER — METRONIDAZOLE 500 MG/100ML
500 INJECTION, SOLUTION INTRAVENOUS
Status: CANCELLED | OUTPATIENT
Start: 2024-12-04

## 2024-12-04 RX ORDER — GABAPENTIN 250 MG/5ML
300 SOLUTION ORAL 2 TIMES DAILY
Status: CANCELLED | OUTPATIENT
Start: 2024-12-04

## 2024-12-04 RX ORDER — PANTOPRAZOLE SODIUM 40 MG/10ML
40 INJECTION, POWDER, LYOPHILIZED, FOR SOLUTION INTRAVENOUS 2 TIMES DAILY
Status: CANCELLED | OUTPATIENT
Start: 2024-12-04

## 2024-12-04 RX ORDER — ONDANSETRON 8 MG/1
8 TABLET, ORALLY DISINTEGRATING ORAL EVERY 6 HOURS PRN
Qty: 30 TABLET | Refills: 0 | Status: SHIPPED | OUTPATIENT
Start: 2024-12-04

## 2024-12-04 RX ORDER — OMEPRAZOLE 40 MG/1
40 CAPSULE, DELAYED RELEASE ORAL EVERY MORNING
Qty: 30 CAPSULE | Refills: 2 | Status: SHIPPED | OUTPATIENT
Start: 2024-12-04

## 2024-12-04 RX ORDER — SODIUM CHLORIDE 9 MG/ML
INJECTION, SOLUTION INTRAVENOUS CONTINUOUS
Status: CANCELLED | OUTPATIENT
Start: 2024-12-04

## 2024-12-04 RX ORDER — PROMETHAZINE HYDROCHLORIDE 12.5 MG/1
12.5 SUPPOSITORY RECTAL EVERY 6 HOURS PRN
Qty: 5 SUPPOSITORY | Refills: 0 | Status: SHIPPED | OUTPATIENT
Start: 2024-12-04

## 2024-12-04 RX ORDER — MUPIROCIN 20 MG/G
OINTMENT TOPICAL
Status: CANCELLED | OUTPATIENT
Start: 2024-12-04

## 2024-12-04 RX ORDER — DEXTROMETHORPHAN/PSEUDOEPHED 2.5-7.5/.8
40 DROPS ORAL 4 TIMES DAILY PRN
Status: CANCELLED | OUTPATIENT
Start: 2024-12-04

## 2024-12-04 RX ORDER — ENOXAPARIN SODIUM 100 MG/ML
60 INJECTION SUBCUTANEOUS EVERY 12 HOURS
Status: CANCELLED | OUTPATIENT
Start: 2024-12-04

## 2024-12-04 RX ORDER — ACETAMINOPHEN 650 MG/20.3ML
1000 LIQUID ORAL EVERY 8 HOURS
Status: CANCELLED | OUTPATIENT
Start: 2024-12-04

## 2024-12-04 RX ORDER — PROCHLORPERAZINE EDISYLATE 5 MG/ML
5 INJECTION INTRAMUSCULAR; INTRAVENOUS EVERY 6 HOURS PRN
Status: CANCELLED | OUTPATIENT
Start: 2024-12-04

## 2024-12-04 RX ORDER — HEPARIN SODIUM 5000 [USP'U]/ML
5000 INJECTION, SOLUTION INTRAVENOUS; SUBCUTANEOUS ONCE
Status: CANCELLED | OUTPATIENT
Start: 2024-12-04 | End: 2024-12-04

## 2024-12-04 RX ORDER — POLYETHYLENE GLYCOL 3350 17 G/17G
17 POWDER, FOR SOLUTION ORAL DAILY
Qty: 510 G | Refills: 0 | Status: SHIPPED | OUTPATIENT
Start: 2024-12-04 | End: 2025-01-05

## 2024-12-04 RX ORDER — LIDOCAINE HYDROCHLORIDE 10 MG/ML
1 INJECTION, SOLUTION EPIDURAL; INFILTRATION; INTRACAUDAL; PERINEURAL ONCE
Status: CANCELLED | OUTPATIENT
Start: 2024-12-04 | End: 2024-12-04

## 2024-12-04 RX ORDER — SODIUM CHLORIDE, SODIUM LACTATE, POTASSIUM CHLORIDE, CALCIUM CHLORIDE 600; 310; 30; 20 MG/100ML; MG/100ML; MG/100ML; MG/100ML
INJECTION, SOLUTION INTRAVENOUS CONTINUOUS
Status: CANCELLED | OUTPATIENT
Start: 2024-12-04

## 2024-12-04 RX ORDER — GABAPENTIN 300 MG/1
CAPSULE ORAL
Qty: 11 CAPSULE | Refills: 0 | Status: SHIPPED | OUTPATIENT
Start: 2024-12-04

## 2024-12-04 RX ORDER — ACETAMINOPHEN 10 MG/ML
1000 INJECTION, SOLUTION INTRAVENOUS ONCE
Status: CANCELLED | OUTPATIENT
Start: 2024-12-04 | End: 2024-12-04

## 2024-12-04 RX ORDER — URSODIOL 500 MG/1
500 TABLET, FILM COATED ORAL DAILY
Qty: 180 TABLET | Refills: 0 | Status: SHIPPED | OUTPATIENT
Start: 2024-12-04 | End: 2025-06-02

## 2024-12-04 RX ORDER — FAMOTIDINE 10 MG/ML
20 INJECTION INTRAVENOUS ONCE
Status: CANCELLED | OUTPATIENT
Start: 2024-12-04 | End: 2024-12-04

## 2024-12-04 RX ORDER — OXYCODONE HCL 5 MG/5 ML
5 SOLUTION, ORAL ORAL EVERY 6 HOURS PRN
Status: CANCELLED | OUTPATIENT
Start: 2024-12-04

## 2024-12-04 RX ORDER — ACETAMINOPHEN 650 MG/20.3ML
500 LIQUID ORAL
Status: CANCELLED | OUTPATIENT
Start: 2024-12-04

## 2024-12-04 RX ORDER — ONDANSETRON HYDROCHLORIDE 2 MG/ML
8 INJECTION, SOLUTION INTRAVENOUS EVERY 6 HOURS
Status: CANCELLED | OUTPATIENT
Start: 2024-12-04

## 2024-12-04 RX ORDER — KETOROLAC TROMETHAMINE 15 MG/ML
15 INJECTION, SOLUTION INTRAMUSCULAR; INTRAVENOUS ONCE
Status: CANCELLED | OUTPATIENT
Start: 2024-12-04 | End: 2024-12-04

## 2024-12-04 NOTE — H&P (VIEW-ONLY)
Subjective:  The patient is a 54 y.o. obese male who presents for pre op for gastric bypass surgery.  He has multiple associated comorbidities including essential hypertension and QUEENIE and CAD.  The patient has tried Weight Watchers, calorie counting, Exercise, and GLP1 .  The patient was most successful with low calorie and exercise with a weight loss of 20lbs.  His current exercise includes walking 3 times a week. He denies any history of eating disorder such as anorexia, bulimia, or taking laxatives for weight loss, and denies any addiction including illicit substances, alcohol, or gambling.  Patient states he has a good  support system.    He is currently employed as a  at [employer] .  He  denies a history of GERD.   All workup has been reviewed in clinic today and there is nothing on the review that would prevent us from proceeding with surgery.  All questions were answered in clinic today prior to leaving.  Body mass index is 61.81 kg/m².       Patient Active Problem List    Diagnosis Date Noted    Coronary artery disease involving native coronary artery of native heart without angina pectoris 11/06/2024    History of COVID-19 11/06/2024    History of type 2 diabetes mellitus 11/06/2024    Jaw fracture 11/06/2024    Edema 11/06/2024    Acquired hypothyroidism 08/06/2024    Anxiety 08/05/2024    BMI 60.0-69.9, adult 01/03/2024    Vitamin D deficiency 01/03/2024    Hypertension 01/03/2024    Mixed hyperlipidemia 01/03/2024    QUEENIE treated with BiPAP 01/03/2024    Screening for colon cancer 10/17/2022     Past Medical History:   Diagnosis Date    Coronary artery disease     Diabetes mellitus, type 2     Hyperlipidemia     Hypertension     Hypogonadism in male     Hypothyroidism associated with surgical procedure     Insulin resistance     Obesity,     QUEENIE (obstructive sleep apnea)     Sleep apnea     Vitamin D deficiency       Past Surgical History:   Procedure Laterality Date    COLONOSCOPY N/A  11/23/2022    Procedure: COLON;  Surgeon: Drake Farley MD;  Location: [facility] ENDOSCOPY;  Service: Gastroenterology;  Laterality: N/A;    LEFT HEART CATHETERIZATION  2022    MANDIBLE FRACTURE SURGERY      THYROID SURGERY      TONSILLECTOMY        (Not in a hospital admission)    Review of patient's allergies indicates:  No Known Allergies   Social History     Tobacco Use    Smoking status: Never    Smokeless tobacco: Never   Substance Use Topics    Alcohol use: Yes     Comment: A few times in a year      Family History   Problem Relation Name Age of Onset    Hypertension Mother      Hypertension Father      Breast cancer Maternal Grandmother      Lung cancer Maternal Grandfather      Diabetes Mellitus Paternal Grandmother          Review of Systems  Constitutional: negative for anorexia, chills and fatigue  Eyes: negative for icterus, irritation and redness  Respiratory: negative for cough and dyspnea on exertion  Cardiovascular: negative for chest pain and chest pressure/discomfort  Gastrointestinal: negative for abdominal pain, change in bowel habits, constipation and diarrhea  Musculoskeletal:negative for arthralgias and back pain  Neurological: negative for coordination problems and dizziness  Behavioral/Psych: negative for anxiety and bad mood    Objective:  Vital signs in last 24 hours:  Vitals:    12/04/24 1501   BP: (!) 169/77   Pulse: 71   SpO2: 98%   Weight: (!) 179 kg (394 lb 10 oz)               General appearance: alert, appears stated age and cooperative  Head: Normocephalic, without obvious abnormality, atraumatic  Eyes: negative findings: lids and lashes normal and conjunctivae and sclerae normal  Lungs: non labored breathing  Heart: regular rate and rhythm  Abdomen: soft, non-tender  Extremities: extremities normal, atraumatic, no cyanosis or edema  Skin: Skin color, texture, turgor normal. No rashes or lesions  Neurologic: Grossly normal    Data  Review:  Reviewed    Assessment/Plan:  Morbid obesity with failure of conservative therapy.    The patient was informed that risks include, but are not limited to: death, leak, obstruction, bleeding, and sepsis. Any of these could require further surgery. Other risks include DVT, PE, pneumonia, wound dehiscence, hernia, wound infection, the need for dilatations and the inability to lose appropriate weight and keep it off.     We discussed that our goal is to ameliorate his medical problems and not to obtain a specific body mass index. He understands the risks and benefits and wishes to proceed with the procedure. He has signed a consent form.       Darien Bai MD

## 2024-12-04 NOTE — PROGRESS NOTES
Subjective:  The patient is a 54 y.o. obese male who presents for pre op for gastric bypass surgery.  He has multiple associated comorbidities including essential hypertension and QUEENIE and CAD.  The patient has tried Weight Watchers, calorie counting, Exercise, and GLP1 .  The patient was most successful with low calorie and exercise with a weight loss of 20lbs.  His current exercise includes walking 3 times a week. He denies any history of eating disorder such as anorexia, bulimia, or taking laxatives for weight loss, and denies any addiction including illicit substances, alcohol, or gambling.  Patient states he has a good  support system.    He is currently employed as a  at [employer] .  He  denies a history of GERD.   All workup has been reviewed in clinic today and there is nothing on the review that would prevent us from proceeding with surgery.  All questions were answered in clinic today prior to leaving.  Body mass index is 61.81 kg/m².       Patient Active Problem List    Diagnosis Date Noted    Coronary artery disease involving native coronary artery of native heart without angina pectoris 11/06/2024    History of COVID-19 11/06/2024    History of type 2 diabetes mellitus 11/06/2024    Jaw fracture 11/06/2024    Edema 11/06/2024    Acquired hypothyroidism 08/06/2024    Anxiety 08/05/2024    BMI 60.0-69.9, adult 01/03/2024    Vitamin D deficiency 01/03/2024    Hypertension 01/03/2024    Mixed hyperlipidemia 01/03/2024    QUEENIE treated with BiPAP 01/03/2024    Screening for colon cancer 10/17/2022     Past Medical History:   Diagnosis Date    Coronary artery disease     Diabetes mellitus, type 2     Hyperlipidemia     Hypertension     Hypogonadism in male     Hypothyroidism associated with surgical procedure     Insulin resistance     Obesity,     QUEENIE (obstructive sleep apnea)     Sleep apnea     Vitamin D deficiency       Past Surgical History:   Procedure Laterality Date    COLONOSCOPY N/A  11/23/2022    Procedure: COLON;  Surgeon: Drake Farley MD;  Location: [facility] ENDOSCOPY;  Service: Gastroenterology;  Laterality: N/A;    LEFT HEART CATHETERIZATION  2022    MANDIBLE FRACTURE SURGERY      THYROID SURGERY      TONSILLECTOMY        (Not in a hospital admission)    Review of patient's allergies indicates:  No Known Allergies   Social History     Tobacco Use    Smoking status: Never    Smokeless tobacco: Never   Substance Use Topics    Alcohol use: Yes     Comment: A few times in a year      Family History   Problem Relation Name Age of Onset    Hypertension Mother      Hypertension Father      Breast cancer Maternal Grandmother      Lung cancer Maternal Grandfather      Diabetes Mellitus Paternal Grandmother          Review of Systems  Constitutional: negative for anorexia, chills and fatigue  Eyes: negative for icterus, irritation and redness  Respiratory: negative for cough and dyspnea on exertion  Cardiovascular: negative for chest pain and chest pressure/discomfort  Gastrointestinal: negative for abdominal pain, change in bowel habits, constipation and diarrhea  Musculoskeletal:negative for arthralgias and back pain  Neurological: negative for coordination problems and dizziness  Behavioral/Psych: negative for anxiety and bad mood    Objective:  Vital signs in last 24 hours:  Vitals:    12/04/24 1501   BP: (!) 169/77   Pulse: 71   SpO2: 98%   Weight: (!) 179 kg (394 lb 10 oz)               General appearance: alert, appears stated age and cooperative  Head: Normocephalic, without obvious abnormality, atraumatic  Eyes: negative findings: lids and lashes normal and conjunctivae and sclerae normal  Lungs: non labored breathing  Heart: regular rate and rhythm  Abdomen: soft, non-tender  Extremities: extremities normal, atraumatic, no cyanosis or edema  Skin: Skin color, texture, turgor normal. No rashes or lesions  Neurologic: Grossly normal    Data  Review:  Reviewed    Assessment/Plan:  Morbid obesity with failure of conservative therapy.    The patient was informed that risks include, but are not limited to: death, leak, obstruction, bleeding, and sepsis. Any of these could require further surgery. Other risks include DVT, PE, pneumonia, wound dehiscence, hernia, wound infection, the need for dilatations and the inability to lose appropriate weight and keep it off.     We discussed that our goal is to ameliorate his medical problems and not to obtain a specific body mass index. He understands the risks and benefits and wishes to proceed with the procedure. He has signed a consent form.       Darien Bai MD

## 2024-12-04 NOTE — ANESTHESIA PREPROCEDURE EVALUATION
[facility]  Anesthesia Pre-Operative Evaluation         Patient Name: Hayden Puckett  YOB: 1970  MRN: [id number]    SUBJECTIVE:     Pre-operative evaluation for Procedure(s) (LRB):  GASTROENTEROSTOMY, LAPAROSCOPIC W/ INTRA OP EGD (N/A)     12/04/2024    Hayden Puckett is a 54 y.o. male w/ a significant PMHx of HTN, HLD, QUEENIE, CAD s/p PCI, anxiety, and obesity (BMI 61.8).    Patient now presents for the above procedure(s).      LDA:      Prev airway: None documented.    Drips: None documented.    Cardiac cath (12-29-22):  Impression   1. Successful balloon angioplasty of the posterior descending artery   2.  Successful balloon angioplasty of the posterolateral branch   3.  Nonobstructive left main, circumflex, and left anterior descending   arteries   4. Systemic hypertension   5.  Normal left ventricular systolic function   6.  Morbid obesity     Patient Active Problem List   Diagnosis    Screening for colon cancer    BMI 60.0-69.9, adult    Vitamin D deficiency    Hypertension    Mixed hyperlipidemia    QUEENIE treated with BiPAP    Anxiety    Acquired hypothyroidism    Coronary artery disease involving native coronary artery of native heart without angina pectoris    History of COVID-19    History of type 2 diabetes mellitus    Jaw fracture    Edema       Review of patient's allergies indicates:  No Known Allergies    Current Outpatient Medications:  No current facility-administered medications for this encounter.    Current Outpatient Medications:     acetaminophen 500 mg PwPk, Take 1,000 mg by mouth every 8 (eight) hours. Take 1g (2 packets) every 8 hours for at least 3 days and up to 5 days as needed. May take one additional packet (500mg) per day for breakthrough.  Do not exceed 4g in 24 hours. for 5 days, Disp: 35 packet, Rfl: 0    amLODIPine-benazepriL (LOTREL) 5-40 mg per capsule, Take 1 capsule by mouth 2 (two) times daily., Disp: 180 capsule, Rfl: 3    aspirin (ECOTRIN) 81 MG  EC tablet, Take 1 tablet (81 mg total) by mouth once daily., Disp: 90 tablet, Rfl: 3    aspirin (ECOTRIN) 81 MG EC tablet, Take 81 mg by mouth once daily., Disp: , Rfl:     cholecalciferol, vitamin D3, (VITAMIN D3) 50 mcg (2,000 unit) Cap capsule, Take by mouth once daily. (Patient not taking: Reported on 12/4/2024), Disp: , Rfl:     clopidogreL (PLAVIX) 75 mg tablet, Take 75 mg by mouth once daily. (Patient not taking: Reported on 12/4/2024), Disp: , Rfl:     gabapentin (NEURONTIN) 300 MG capsule, OPEN capsule into a tablespoon and consume with sip of liquid. Take once the MORNING OF SURGERY. After surgery: take one capsule TWICE DAILY for at least 3 days and up to 5 days as needed for pain., Disp: 11 capsule, Rfl: 0    hydroCHLOROthiazide (HYDRODIURIL) 25 MG tablet, Take 1 tablet (25 mg total) by mouth once daily. (Patient not taking: Reported on 12/4/2024), Disp: 90 tablet, Rfl: 3    levothyroxine (SYNTHROID) 125 MCG tablet, Take 1 tablet (125 mcg total) by mouth before breakfast., Disp: 90 tablet, Rfl: 3    omeprazole (PRILOSEC) 40 MG capsule, Take 1 capsule (40 mg total) by mouth every morning. Open capsule and take with apple sauce, Disp: 30 capsule, Rfl: 2    ondansetron (ZOFRAN-ODT) 8 MG TbDL, Take 1 tablet (8 mg total) by mouth every 6 (six) hours as needed (Nausea)., Disp: 30 tablet, Rfl: 0    polyethylene glycol (GLYCOLAX) 17 gram/dose powder, Take 17 g by mouth once daily., Disp: 510 g, Rfl: 0    pravastatin (PRAVACHOL) 40 MG tablet, Take 40 mg by mouth once daily., Disp: , Rfl:     promethazine (PHENERGAN) 12.5 MG Supp, Unwrap and insert 1 suppository (12.5 mg total) rectally every 6 (six) hours as needed (nausea, 2nd line)., Disp: 5 suppository, Rfl: 0    psyllium (METAMUCIL) powder, Take 1 packet by mouth once daily., Disp: , Rfl:     ranolazine (RANEXA) 1,000 mg Tb12, Take 1,000 mg by mouth 2 (two) times daily., Disp: , Rfl:     ursodioL (ACTIGALL) 500 MG tablet, Take 1 tablet (500 mg total) by mouth  once daily. For gallstone prevention., Disp: 180 tablet, Rfl: 0    Past Surgical History:   Procedure Laterality Date    COLONOSCOPY N/A 11/23/2022    Procedure: COLON;  Surgeon: Drake Farley MD;  Location: [facility] ENDOSCOPY;  Service: Gastroenterology;  Laterality: N/A;    LEFT HEART CATHETERIZATION  2022    MANDIBLE FRACTURE SURGERY      THYROID SURGERY      TONSILLECTOMY         Social History     Socioeconomic History    Marital status:     Number of children: 3   Tobacco Use    Smoking status: Never    Smokeless tobacco: Never   Substance and Sexual Activity    Alcohol use: Not Currently     Comment: A few times in a year    Drug use: Never     Social Drivers of Health     Financial Resource Strain: Low Risk  (11/27/2023)    Overall Financial Resource Strain (CARDIA)     Difficulty of Paying Living Expenses: Not very hard   Food Insecurity: No Food Insecurity (11/27/2023)    Hunger Vital Sign     Worried About Running Out of Food in the Last Year: Never true     Ran Out of Food in the Last Year: Never true   Transportation Needs: No Transportation Needs (11/27/2023)    PRAPARE - Transportation     Lack of Transportation (Medical): No     Lack of Transportation (Non-Medical): No   Physical Activity: Insufficiently Active (11/27/2023)    Exercise Vital Sign     Days of Exercise per Week: 3 days     Minutes of Exercise per Session: 20 min   Stress: No Stress Concern Present (11/27/2023)    [place] [employer] - Occupational Stress Questionnaire     Feeling of Stress : Not at all   Housing Stability: Low Risk  (11/27/2023)    Housing Stability Vital Sign     Unable to Pay for Housing in the Last Year: No     Number of Places Lived in the Last Year: 1     Unstable Housing in the Last Year: No       OBJECTIVE:     Vital Signs Range (Last 24H):  Pulse:  [71]   BP: (169)/(77)   SpO2:  [98 %]       Significant Labs:  Lab Results   Component Value Date    WBC 8.87 11/06/2024    HGB  14.4 11/06/2024    HCT 43.8 11/06/2024     11/06/2024    CHOL 140 11/06/2024    TRIG 129 11/06/2024    HDL 44 11/06/2024    ALT 14 11/06/2024    AST 14 11/06/2024     11/06/2024    K 4.0 11/06/2024     11/06/2024    CREATININE 1.2 11/06/2024    BUN 14 11/06/2024    CO2 27 11/06/2024    TSH 0.811 11/06/2024    INR 1.0 09/20/2024    HGBA1C 5.4 11/06/2024       Diagnostic Studies: No relevant studies.    EKG:   Results for orders placed or performed during the hospital encounter of 11/06/24   EKG    Collection Time: 11/06/24  7:33 AM   Result Value Ref Range    QRS Duration 80 ms    OHS QTC Calculation 429 ms    Narrative    Test Reason : I10,Z68.44,E66.01,    Vent. Rate : 068 BPM     Atrial Rate : 068 BPM     P-R Int : 128 ms          QRS Dur : 080 ms      QT Int : 404 ms       P-R-T Axes : 053 048 -23 degrees     QTc Int : 429 ms    Normal sinus rhythm  ST and T wave abnormality, consider inferior ischemia  Abnormal ECG  When compared with ECG of 06-JUL-2022 03:52,  No significant change was found  Confirmed by RAD GALVAN MD ([id number]) on 11/6/2024 8:26:51 AM    Referred By: SHAHRIAR VITALE           Confirmed By:RAD GALVAN MD       2D ECHO:  TTE:  No results found for this or any previous visit.    RAUL:  No results found for this or any previous visit.    ASSESSMENT/PLAN:           Pre-op Assessment    I have reviewed the Patient Summary Reports.     I have reviewed the Nursing Notes. I have reviewed the NPO Status.   I have reviewed the Medications.     Review of Systems  Anesthesia Hx:             Denies Family Hx of Anesthesia complications.   Personal Hx of Anesthesia complications, Post-Operative Nausea/Vomiting, in the past, but not with recent anesthetics / prophylaxis                    Hematology/Oncology:  Hematology Normal                                     Cardiovascular:     Hypertension   CAD       Denies Angina.         S/p angioplasty of posterior descending artery and  posterolateral branch                              Pulmonary:       Denies Shortness of breath.  Sleep Apnea, CPAP                Neurological:  Neurology Normal                                      Endocrine:        Morbid Obesity / BMI > 40      Physical Exam  General: Well nourished, Cooperative, Alert and Oriented    Airway:  Mallampati: III   Mouth Opening: Normal  TM Distance: Normal  Tongue: Normal  Neck ROM: Normal ROM    Dental:  Intact    Chest/Lungs:  Normal Respiratory Rate    Heart:  Rate: Normal        Anesthesia Plan  Type of Anesthesia, risks & benefits discussed:    Anesthesia Type: Gen ETT, Regional  Intra-op Monitoring Plan: Standard ASA Monitors  Post Op Pain Control Plan: multimodal analgesia and IV/PO Opioids PRN  Induction:  IV  Airway Plan: Direct and Video, Post-Induction  Informed Consent: Informed consent signed with the Patient and all parties understand the risks and agree with anesthesia plan.  All questions answered.   ASA Score: 4  Day of Surgery Review of History & Physical: H&P Update referred to the surgeon/provider.    Ready For Surgery From Anesthesia Perspective.     .

## 2024-12-05 ENCOUNTER — TELEPHONE (OUTPATIENT)
Dept: BARIATRICS | Facility: CLINIC | Age: 54
End: 2024-12-05
Payer: COMMERCIAL

## 2024-12-05 ENCOUNTER — HOSPITAL ENCOUNTER (INPATIENT)
Facility: HOSPITAL | Age: 54
LOS: 1 days | Discharge: HOME | DRG: 621 | End: 2024-12-06
Attending: SURGERY | Admitting: SURGERY
Payer: COMMERCIAL

## 2024-12-05 ENCOUNTER — ANESTHESIA (OUTPATIENT)
Dept: SURGERY | Facility: HOSPITAL | Age: 54
End: 2024-12-05
Payer: COMMERCIAL

## 2024-12-05 DIAGNOSIS — E66.9 OBESITY, UNSPECIFIED CLASS, UNSPECIFIED OBESITY TYPE, UNSPECIFIED WHETHER SERIOUS COMORBIDITY PRESENT: Primary | ICD-10-CM

## 2024-12-05 LAB
ABO + RH BLD: NORMAL
BLD GP AB SCN CELLS X3 SERPL QL: NORMAL
POCT GLUCOSE: 135 MG/DL (ref 70–110)
SPECIMEN OUTDATE: NORMAL

## 2024-12-05 PROCEDURE — 63600175 PHARM REV CODE 636 W HCPCS

## 2024-12-05 PROCEDURE — 71000016 HC POSTOP RECOV ADDL HR: Performed by: SURGERY

## 2024-12-05 PROCEDURE — 25000003 PHARM REV CODE 250

## 2024-12-05 PROCEDURE — 99900035 HC TECH TIME PER 15 MIN (STAT)

## 2024-12-05 PROCEDURE — 36000710: Performed by: SURGERY

## 2024-12-05 PROCEDURE — 63600175 PHARM REV CODE 636 W HCPCS: Performed by: SURGERY

## 2024-12-05 PROCEDURE — 82962 GLUCOSE BLOOD TEST: CPT | Performed by: SURGERY

## 2024-12-05 PROCEDURE — 25000003 PHARM REV CODE 250: Performed by: SURGERY

## 2024-12-05 PROCEDURE — 63600175 PHARM REV CODE 636 W HCPCS: Mod: JZ,JG | Performed by: ANESTHESIOLOGY

## 2024-12-05 PROCEDURE — 0DJ08ZZ INSPECTION OF UPPER INTESTINAL TRACT, VIA NATURAL OR ARTIFICIAL OPENING ENDOSCOPIC: ICD-10-PCS | Performed by: SURGERY

## 2024-12-05 PROCEDURE — 36415 COLL VENOUS BLD VENIPUNCTURE: CPT

## 2024-12-05 PROCEDURE — 94799 UNLISTED PULMONARY SVC/PX: CPT

## 2024-12-05 PROCEDURE — 27201423 OPTIME MED/SURG SUP & DEVICES STERILE SUPPLY: Performed by: SURGERY

## 2024-12-05 PROCEDURE — 71000033 HC RECOVERY, INTIAL HOUR: Performed by: SURGERY

## 2024-12-05 PROCEDURE — 11000001 HC ACUTE MED/SURG PRIVATE ROOM

## 2024-12-05 PROCEDURE — 43644 LAP GASTRIC BYPASS/ROUX-EN-Y: CPT | Mod: COE,,, | Performed by: SURGERY

## 2024-12-05 PROCEDURE — 37000008 HC ANESTHESIA 1ST 15 MINUTES: Performed by: SURGERY

## 2024-12-05 PROCEDURE — 71000015 HC POSTOP RECOV 1ST HR: Performed by: SURGERY

## 2024-12-05 PROCEDURE — 94761 N-INVAS EAR/PLS OXIMETRY MLT: CPT

## 2024-12-05 PROCEDURE — C1781 MESH (IMPLANTABLE): HCPCS | Performed by: SURGERY

## 2024-12-05 PROCEDURE — 36000711: Performed by: SURGERY

## 2024-12-05 PROCEDURE — 37000009 HC ANESTHESIA EA ADD 15 MINS: Performed by: SURGERY

## 2024-12-05 PROCEDURE — 76942 ECHO GUIDE FOR BIOPSY: CPT

## 2024-12-05 PROCEDURE — 63600175 PHARM REV CODE 636 W HCPCS: Mod: JZ,JG | Performed by: SURGERY

## 2024-12-05 PROCEDURE — 86850 RBC ANTIBODY SCREEN: CPT

## 2024-12-05 PROCEDURE — 0D164ZA BYPASS STOMACH TO JEJUNUM, PERCUTANEOUS ENDOSCOPIC APPROACH: ICD-10-PCS | Performed by: SURGERY

## 2024-12-05 DEVICE — SEAMGUARD ESCHELON 60 MM.: Type: IMPLANTABLE DEVICE | Site: ABDOMEN | Status: FUNCTIONAL

## 2024-12-05 RX ORDER — GABAPENTIN 250 MG/5ML
300 SOLUTION ORAL 2 TIMES DAILY
Status: DISCONTINUED | OUTPATIENT
Start: 2024-12-05 | End: 2024-12-06 | Stop reason: HOSPADM

## 2024-12-05 RX ORDER — ONDANSETRON HYDROCHLORIDE 2 MG/ML
INJECTION, SOLUTION INTRAVENOUS
Status: DISCONTINUED | OUTPATIENT
Start: 2024-12-05 | End: 2024-12-05

## 2024-12-05 RX ORDER — ASPIRIN 81 MG/1
81 TABLET ORAL DAILY
Status: DISCONTINUED | OUTPATIENT
Start: 2024-12-06 | End: 2024-12-06 | Stop reason: HOSPADM

## 2024-12-05 RX ORDER — SODIUM CHLORIDE 0.9 % (FLUSH) 0.9 %
10 SYRINGE (ML) INJECTION
Status: DISCONTINUED | OUTPATIENT
Start: 2024-12-05 | End: 2024-12-05 | Stop reason: HOSPADM

## 2024-12-05 RX ORDER — LEVOTHYROXINE SODIUM 125 UG/1
125 TABLET ORAL
Status: DISCONTINUED | OUTPATIENT
Start: 2024-12-06 | End: 2024-12-06 | Stop reason: HOSPADM

## 2024-12-05 RX ORDER — OXYCODONE HCL 5 MG/5 ML
5 SOLUTION, ORAL ORAL EVERY 6 HOURS PRN
Status: DISCONTINUED | OUTPATIENT
Start: 2024-12-05 | End: 2024-12-06 | Stop reason: HOSPADM

## 2024-12-05 RX ORDER — SCOLOPAMINE TRANSDERMAL SYSTEM 1 MG/1
1 PATCH, EXTENDED RELEASE TRANSDERMAL ONCE
Status: DISCONTINUED | OUTPATIENT
Start: 2024-12-05 | End: 2024-12-06 | Stop reason: HOSPADM

## 2024-12-05 RX ORDER — ACETAMINOPHEN 10 MG/ML
1000 INJECTION, SOLUTION INTRAVENOUS ONCE
Status: COMPLETED | OUTPATIENT
Start: 2024-12-05 | End: 2024-12-05

## 2024-12-05 RX ORDER — DEXAMETHASONE SODIUM PHOSPHATE 4 MG/ML
INJECTION, SOLUTION INTRA-ARTICULAR; INTRALESIONAL; INTRAMUSCULAR; INTRAVENOUS; SOFT TISSUE
Status: DISCONTINUED | OUTPATIENT
Start: 2024-12-05 | End: 2024-12-05

## 2024-12-05 RX ORDER — ENOXAPARIN SODIUM 100 MG/ML
60 INJECTION SUBCUTANEOUS EVERY 12 HOURS
Status: DISCONTINUED | OUTPATIENT
Start: 2024-12-05 | End: 2024-12-06 | Stop reason: HOSPADM

## 2024-12-05 RX ORDER — SUCCINYLCHOLINE CHLORIDE 20 MG/ML
INJECTION INTRAMUSCULAR; INTRAVENOUS
Status: DISCONTINUED | OUTPATIENT
Start: 2024-12-05 | End: 2024-12-05

## 2024-12-05 RX ORDER — EPHEDRINE SULFATE 50 MG/ML
INJECTION, SOLUTION INTRAVENOUS
Status: DISCONTINUED | OUTPATIENT
Start: 2024-12-05 | End: 2024-12-05

## 2024-12-05 RX ORDER — PROCHLORPERAZINE EDISYLATE 5 MG/ML
5 INJECTION INTRAMUSCULAR; INTRAVENOUS EVERY 6 HOURS PRN
Status: DISCONTINUED | OUTPATIENT
Start: 2024-12-05 | End: 2024-12-06 | Stop reason: HOSPADM

## 2024-12-05 RX ORDER — PHENYLEPHRINE HYDROCHLORIDE 10 MG/ML
INJECTION INTRAVENOUS
Status: DISCONTINUED | OUTPATIENT
Start: 2024-12-05 | End: 2024-12-05

## 2024-12-05 RX ORDER — PRAVASTATIN SODIUM 40 MG/1
40 TABLET ORAL DAILY
Status: DISCONTINUED | OUTPATIENT
Start: 2024-12-06 | End: 2024-12-06 | Stop reason: HOSPADM

## 2024-12-05 RX ORDER — PROCHLORPERAZINE EDISYLATE 5 MG/ML
5 INJECTION INTRAMUSCULAR; INTRAVENOUS EVERY 30 MIN PRN
Status: DISCONTINUED | OUTPATIENT
Start: 2024-12-05 | End: 2024-12-05 | Stop reason: HOSPADM

## 2024-12-05 RX ORDER — FAMOTIDINE 10 MG/ML
20 INJECTION INTRAVENOUS ONCE
Status: COMPLETED | OUTPATIENT
Start: 2024-12-05 | End: 2024-12-05

## 2024-12-05 RX ORDER — LIDOCAINE HYDROCHLORIDE 20 MG/ML
INJECTION, SOLUTION EPIDURAL; INFILTRATION; INTRACAUDAL; PERINEURAL
Status: DISCONTINUED | OUTPATIENT
Start: 2024-12-05 | End: 2024-12-05

## 2024-12-05 RX ORDER — BUPIVACAINE HYDROCHLORIDE 7.5 MG/ML
INJECTION, SOLUTION EPIDURAL; RETROBULBAR
Status: COMPLETED | OUTPATIENT
Start: 2024-12-05 | End: 2024-12-05

## 2024-12-05 RX ORDER — GLUCAGON 1 MG
1 KIT INJECTION
Status: DISCONTINUED | OUTPATIENT
Start: 2024-12-05 | End: 2024-12-05 | Stop reason: HOSPADM

## 2024-12-05 RX ORDER — ACETAMINOPHEN 650 MG/20.3ML
500 LIQUID ORAL
Status: DISCONTINUED | OUTPATIENT
Start: 2024-12-05 | End: 2024-12-06 | Stop reason: HOSPADM

## 2024-12-05 RX ORDER — MUPIROCIN 20 MG/G
OINTMENT TOPICAL
Status: DISCONTINUED | OUTPATIENT
Start: 2024-12-05 | End: 2024-12-05 | Stop reason: HOSPADM

## 2024-12-05 RX ORDER — ACETAMINOPHEN 500 MG
1000 TABLET ORAL
Status: DISCONTINUED | OUTPATIENT
Start: 2024-12-05 | End: 2024-12-05

## 2024-12-05 RX ORDER — ROCURONIUM BROMIDE 10 MG/ML
INJECTION, SOLUTION INTRAVENOUS
Status: DISCONTINUED | OUTPATIENT
Start: 2024-12-05 | End: 2024-12-05

## 2024-12-05 RX ORDER — PANTOPRAZOLE SODIUM 40 MG/10ML
40 INJECTION, POWDER, LYOPHILIZED, FOR SOLUTION INTRAVENOUS 2 TIMES DAILY
Status: DISCONTINUED | OUTPATIENT
Start: 2024-12-05 | End: 2024-12-06 | Stop reason: HOSPADM

## 2024-12-05 RX ORDER — KETOROLAC TROMETHAMINE 15 MG/ML
15 INJECTION, SOLUTION INTRAMUSCULAR; INTRAVENOUS ONCE
Status: COMPLETED | OUTPATIENT
Start: 2024-12-05 | End: 2024-12-05

## 2024-12-05 RX ORDER — METRONIDAZOLE 500 MG/100ML
500 INJECTION, SOLUTION INTRAVENOUS
Status: COMPLETED | OUTPATIENT
Start: 2024-12-05 | End: 2024-12-05

## 2024-12-05 RX ORDER — HEPARIN SODIUM 5000 [USP'U]/ML
5000 INJECTION, SOLUTION INTRAVENOUS; SUBCUTANEOUS ONCE
Status: COMPLETED | OUTPATIENT
Start: 2024-12-05 | End: 2024-12-05

## 2024-12-05 RX ORDER — ACETAMINOPHEN 650 MG/20.3ML
1000 LIQUID ORAL EVERY 8 HOURS
Status: DISCONTINUED | OUTPATIENT
Start: 2024-12-05 | End: 2024-12-06 | Stop reason: HOSPADM

## 2024-12-05 RX ORDER — PROPOFOL 10 MG/ML
VIAL (ML) INTRAVENOUS
Status: DISCONTINUED | OUTPATIENT
Start: 2024-12-05 | End: 2024-12-05

## 2024-12-05 RX ORDER — SODIUM CHLORIDE, SODIUM LACTATE, POTASSIUM CHLORIDE, CALCIUM CHLORIDE 600; 310; 30; 20 MG/100ML; MG/100ML; MG/100ML; MG/100ML
INJECTION, SOLUTION INTRAVENOUS CONTINUOUS
Status: DISCONTINUED | OUTPATIENT
Start: 2024-12-05 | End: 2024-12-06 | Stop reason: HOSPADM

## 2024-12-05 RX ORDER — MIDAZOLAM HYDROCHLORIDE 1 MG/ML
.5-4 INJECTION, SOLUTION INTRAMUSCULAR; INTRAVENOUS
Status: DISCONTINUED | OUTPATIENT
Start: 2024-12-05 | End: 2024-12-05 | Stop reason: HOSPADM

## 2024-12-05 RX ORDER — SODIUM CHLORIDE 9 MG/ML
INJECTION, SOLUTION INTRAVENOUS CONTINUOUS
Status: DISCONTINUED | OUTPATIENT
Start: 2024-12-05 | End: 2024-12-06 | Stop reason: HOSPADM

## 2024-12-05 RX ORDER — KETAMINE HCL IN 0.9 % NACL 50 MG/5 ML
SYRINGE (ML) INTRAVENOUS
Status: DISCONTINUED | OUTPATIENT
Start: 2024-12-05 | End: 2024-12-05

## 2024-12-05 RX ORDER — LIDOCAINE HYDROCHLORIDE 10 MG/ML
1 INJECTION, SOLUTION EPIDURAL; INFILTRATION; INTRACAUDAL; PERINEURAL ONCE
Status: COMPLETED | OUTPATIENT
Start: 2024-12-05 | End: 2024-12-05

## 2024-12-05 RX ORDER — ONDANSETRON HYDROCHLORIDE 2 MG/ML
8 INJECTION, SOLUTION INTRAVENOUS EVERY 6 HOURS
Status: DISCONTINUED | OUTPATIENT
Start: 2024-12-05 | End: 2024-12-06 | Stop reason: HOSPADM

## 2024-12-05 RX ADMIN — HEPARIN SODIUM 5000 UNITS: 5000 INJECTION INTRAVENOUS; SUBCUTANEOUS at 09:12

## 2024-12-05 RX ADMIN — GABAPENTIN 300 MG: 250 SOLUTION ORAL at 09:12

## 2024-12-05 RX ADMIN — EPHEDRINE SULFATE 5 MG: 50 INJECTION INTRAVENOUS at 11:12

## 2024-12-05 RX ADMIN — MUPIROCIN: 20 OINTMENT TOPICAL at 09:12

## 2024-12-05 RX ADMIN — ENOXAPARIN SODIUM 60 MG: 60 INJECTION SUBCUTANEOUS at 09:12

## 2024-12-05 RX ADMIN — ROCURONIUM BROMIDE 40 MG: 10 INJECTION, SOLUTION INTRAVENOUS at 11:12

## 2024-12-05 RX ADMIN — SODIUM CHLORIDE 3 G: 9 INJECTION, SOLUTION INTRAVENOUS at 11:12

## 2024-12-05 RX ADMIN — METHOCARBAMOL 500 MG: 100 INJECTION INTRAMUSCULAR; INTRAVENOUS at 01:12

## 2024-12-05 RX ADMIN — EPHEDRINE SULFATE 10 MG: 50 INJECTION INTRAVENOUS at 12:12

## 2024-12-05 RX ADMIN — ONDANSETRON 8 MG: 2 INJECTION INTRAMUSCULAR; INTRAVENOUS at 05:12

## 2024-12-05 RX ADMIN — PANTOPRAZOLE SODIUM 40 MG: 40 INJECTION, POWDER, LYOPHILIZED, FOR SOLUTION INTRAVENOUS at 09:12

## 2024-12-05 RX ADMIN — GABAPENTIN 300 MG: 250 SOLUTION ORAL at 01:12

## 2024-12-05 RX ADMIN — KETOROLAC TROMETHAMINE 15 MG: 15 INJECTION, SOLUTION INTRAMUSCULAR; INTRAVENOUS at 09:12

## 2024-12-05 RX ADMIN — PHENYLEPHRINE HYDROCHLORIDE 100 MCG: 10 INJECTION INTRAVENOUS at 12:12

## 2024-12-05 RX ADMIN — ACETAMINOPHEN 1000 MG: 10 INJECTION, SOLUTION INTRAVENOUS at 09:12

## 2024-12-05 RX ADMIN — ROCURONIUM BROMIDE 30 MG: 10 INJECTION, SOLUTION INTRAVENOUS at 12:12

## 2024-12-05 RX ADMIN — METHOCARBAMOL 500 MG: 100 INJECTION INTRAMUSCULAR; INTRAVENOUS at 11:12

## 2024-12-05 RX ADMIN — SUGAMMADEX 600 MG: 100 INJECTION, SOLUTION INTRAVENOUS at 01:12

## 2024-12-05 RX ADMIN — ACETAMINOPHEN 999.01 MG: 650 SOLUTION ORAL at 09:12

## 2024-12-05 RX ADMIN — LIDOCAINE HYDROCHLORIDE 80 MG: 20 INJECTION, SOLUTION EPIDURAL; INFILTRATION; INTRACAUDAL; PERINEURAL at 11:12

## 2024-12-05 RX ADMIN — DEXAMETHASONE SODIUM PHOSPHATE 8 MG: 4 INJECTION, SOLUTION INTRAMUSCULAR; INTRAVENOUS at 11:12

## 2024-12-05 RX ADMIN — LIDOCAINE HYDROCHLORIDE 10 MG: 10 INJECTION, SOLUTION EPIDURAL; INFILTRATION; INTRACAUDAL; PERINEURAL at 09:12

## 2024-12-05 RX ADMIN — BUPIVACAINE HYDROCHLORIDE 30 ML: 7.5 INJECTION, SOLUTION EPIDURAL; RETROBULBAR at 10:12

## 2024-12-05 RX ADMIN — PHENYLEPHRINE HYDROCHLORIDE 200 MCG: 10 INJECTION INTRAVENOUS at 12:12

## 2024-12-05 RX ADMIN — MIDAZOLAM 1 MG: 1 INJECTION INTRAMUSCULAR; INTRAVENOUS at 10:12

## 2024-12-05 RX ADMIN — SUCCINYLCHOLINE 200 MG: 20 INJECTION, SOLUTION INTRAMUSCULAR; INTRAVENOUS at 11:12

## 2024-12-05 RX ADMIN — SCOPOLAMINE 1 PATCH: 1.5 PATCH, EXTENDED RELEASE TRANSDERMAL at 10:12

## 2024-12-05 RX ADMIN — METRONIDAZOLE 500 MG: 500 INJECTION, SOLUTION INTRAVENOUS at 11:12

## 2024-12-05 RX ADMIN — Medication 20 MG: at 12:12

## 2024-12-05 RX ADMIN — PROPOFOL 350 MG: 10 INJECTION, EMULSION INTRAVENOUS at 11:12

## 2024-12-05 RX ADMIN — ONDANSETRON 4 MG: 2 INJECTION INTRAMUSCULAR; INTRAVENOUS at 01:12

## 2024-12-05 RX ADMIN — Medication 20 MG: at 11:12

## 2024-12-05 RX ADMIN — SODIUM CHLORIDE, POTASSIUM CHLORIDE, SODIUM LACTATE AND CALCIUM CHLORIDE: 600; 310; 30; 20 INJECTION, SOLUTION INTRAVENOUS at 01:12

## 2024-12-05 RX ADMIN — SIMETHICONE 40 MG: 20 SUSPENSION/ DROPS ORAL at 09:12

## 2024-12-05 RX ADMIN — SODIUM CHLORIDE, SODIUM GLUCONATE, SODIUM ACETATE, POTASSIUM CHLORIDE, MAGNESIUM CHLORIDE, SODIUM PHOSPHATE, DIBASIC, AND POTASSIUM PHOSPHATE: .53; .5; .37; .037; .03; .012; .00082 INJECTION, SOLUTION INTRAVENOUS at 11:12

## 2024-12-05 RX ADMIN — ACETAMINOPHEN 999.01 MG: 650 SOLUTION ORAL at 03:12

## 2024-12-05 RX ADMIN — ROCURONIUM BROMIDE 30 MG: 10 INJECTION, SOLUTION INTRAVENOUS at 11:12

## 2024-12-05 RX ADMIN — FAMOTIDINE 20 MG: 10 INJECTION, SOLUTION INTRAVENOUS at 09:12

## 2024-12-05 NOTE — ANESTHESIA PROCEDURE NOTES
Intubation    Date/Time: 12/5/2024 11:09 AM    Performed by: Titi Sparrow DO  Authorized by: Leopold, Rhonda G, MD    Intubation:     Induction:  Rapid sequence induction    Intubated:  Postinduction    Mask Ventilation:  Easy with oral airway    Attempts:  1    Attempted By:  Resident anesthesiologist    Method of Intubation:  Video laryngoscopy    Blade:  Win 3    Laryngeal View Grade: Grade I - full view of cords      Difficult Airway Encountered?: No      Complications:  None    Airway Device:  Oral endotracheal tube    Airway Device Size:  7.5    Style/Cuff Inflation:  Cuffed (inflated to minimal occlusive pressure)    Tube secured:  23    Secured at:  The lips    Placement Verified By:  Capnometry    Complicating Factors:  None    Findings Post-Intubation:  BS equal bilateral and atraumatic/condition of teeth unchanged

## 2024-12-05 NOTE — TRANSFER OF CARE
"Anesthesia Transfer of Care Note    Patient: Hayden Puckett    Procedure(s) Performed: Procedure(s) (LRB):  GASTROENTEROSTOMY, LAPAROSCOPIC W/ INTRA OP EGD (N/A)    Patient location: PACU    Anesthesia Type: general    Transport from OR: Transported from OR on 6-10 L/min O2 by face mask with adequate spontaneous ventilation    Post pain: adequate analgesia    Post assessment: no apparent anesthetic complications    Post vital signs: stable    Level of consciousness: awake and alert    Nausea/Vomiting: no nausea/vomiting    Complications: none    Transfer of care protocol was followed      Last vitals: Visit Vitals  BP (!) 179/80 (BP Location: Right forearm, Patient Position: Lying)   Pulse 67   Temp 36.3 °C (97.3 °F) (Temporal)   Resp 17   Ht 5' 7" (1.702 m)   Wt (!) 176.3 kg (388 lb 10.7 oz)   SpO2 100%   BMI 60.87 kg/m²     "

## 2024-12-05 NOTE — NURSING TRANSFER
Nursing Transfer Note      12/5/2024   5:07 PM    Nurse giving handoff: Wisam BUTT RN  Nurse receiving handoff: [address]RE[address]    Reason patient is being transferred: meets criteria    Transfer To: 541    Transfer via bed    Transfer with none    Transported by transport    Transfer Vital Signs:  Blood Pressure:144/68  Heart Rate:78  O2:95  Temperature:98.1  Respirations:20    Telemetry: Rhythm sr  Order for Tele Monitor? No    Additional Lines: none    Medicines sent: LR @ 125    Any special needs or follow-up needed: none    Patient belongings transferred with patient: No    Chart send with patient: Yes    Notified: spouse    Patient reassessed at: 12/5

## 2024-12-05 NOTE — PLAN OF CARE
Pt IV flushed. Lap sites x5 CDI. Pt reports pain relief following administration of medication. LR @ 125

## 2024-12-05 NOTE — ANESTHESIA PROCEDURE NOTES
Bilateral ABILIO single shots    Patient location during procedure: pre-op   Block not for primary anesthetic.  Reason for block: at surgeon's request and post-op pain management   Post-op Pain Location: bilateral abdominal pain   Start time: 12/5/2024 10:21 AM  Timeout: 12/5/2024 10:20 AM   End time: 12/5/2024 10:35 AM    Staffing  Authorizing Provider: Henrietta Cuello MD  Performing Provider: Pat Mota MD    Staffing  Performed by: Pat Mota MD  Authorized by: Henrietta Cuello MD    Preanesthetic Checklist  Completed: patient identified, IV checked, site marked, risks and benefits discussed, surgical consent, monitors and equipment checked, pre-op evaluation and timeout performed  Peripheral Block  Patient position: sitting  Prep: ChloraPrep  Patient monitoring: heart rate, cardiac monitor, continuous pulse ox, continuous capnometry and frequent blood pressure checks  Block type: erector spinae plane  Laterality: bilateral  Injection technique: single shot  Interspace: T7-8    Needle  Needle type: Tuohy   Needle gauge: 17 G  Needle length: 3.5 in  Needle localization: anatomical landmarks and ultrasound guidance   -ultrasound image captured on disc.  Assessment  Injection assessment: negative aspiration, negative parasthesia and local visualized surrounding nerve  Paresthesia pain: none  Heart rate change: no  Slow fractionated injection: yes  Pain Tolerance: comfortable throughout block  Medications:    Medications: bupivacaine (pf) (MARCAINE) injection 0.75% - Perineural   30 mL - 12/5/2024 10:35:00 AM    Additional Notes  Patient tolerated well.  [facility]  30 cc 0.375% bupivacaine with 1:300,000 epi added administered to each side

## 2024-12-05 NOTE — BRIEF OP NOTE
[facility] - Surgery ([facility])  Brief Operative Note    SUMMARY     Surgery Date: 12/5/2024     Surgeons and Role:     * Darien Bai Jr., MD - Primary     * Martina Peguero MD - Resident - Assisting        Pre-op Diagnosis:  Hypertension, unspecified type [I10]  BMI 60.0-69.9, adult [Z68.44]  Morbid obesity [E66.01]  QUEENIE treated with BiPAP [G47.33]    Post-op Diagnosis:  Post-Op Diagnosis Codes:     * Hypertension, unspecified type [I10]     * BMI 60.0-69.9, adult [Z68.44]     * Morbid obesity [E66.01]     * QUEENIE treated with BiPAP [G47.33]    Procedure(s) (LRB):  GASTROENTEROSTOMY, LAPAROSCOPIC W/ INTRA OP EGD (N/A)    Anesthesia: General/Regional    Implants:  Implant Name Type Inv. Item Serial No.  Lot No. LRB No. Used Action   SEAMGUARD ESCHELON 60 MM. - [id number]  SEAMGUARD ESCHELON 60 MM.  [employer] [id number] N/A 5 Implanted       Operative Findings: laparoscopic aurelio-en-y gastric bypass. Intra-op EGD demonstrating widely patent and hemostatic anastomosis     Estimated Blood Loss:  <15cc    Specimens:   Specimen (24h ago, onward)      None            [id number]      Martina Peguero MD  General Surgery, PGY-3

## 2024-12-06 VITALS
HEIGHT: 67 IN | OXYGEN SATURATION: 95 % | HEART RATE: 79 BPM | RESPIRATION RATE: 19 BRPM | WEIGHT: 315 LBS | SYSTOLIC BLOOD PRESSURE: 139 MMHG | TEMPERATURE: 99 F | BODY MASS INDEX: 49.44 KG/M2 | DIASTOLIC BLOOD PRESSURE: 69 MMHG

## 2024-12-06 LAB
ANION GAP SERPL CALC-SCNC: 10 MMOL/L (ref 8–16)
BASOPHILS # BLD AUTO: 0.03 K/UL (ref 0–0.2)
BASOPHILS NFR BLD: 0.1 % (ref 0–1.9)
BUN SERPL-MCNC: 11 MG/DL (ref 6–20)
CALCIUM SERPL-MCNC: 8.7 MG/DL (ref 8.7–10.5)
CHLORIDE SERPL-SCNC: 103 MMOL/L (ref 95–110)
CO2 SERPL-SCNC: 20 MMOL/L (ref 23–29)
CREAT SERPL-MCNC: 1.1 MG/DL (ref 0.5–1.4)
DIFFERENTIAL METHOD BLD: ABNORMAL
EOSINOPHIL # BLD AUTO: 0 K/UL (ref 0–0.5)
EOSINOPHIL NFR BLD: 0 % (ref 0–8)
ERYTHROCYTE [DISTWIDTH] IN BLOOD BY AUTOMATED COUNT: 12.8 % (ref 11.5–14.5)
EST. GFR  (NO RACE VARIABLE): >60 ML/MIN/1.73 M^2
GLUCOSE SERPL-MCNC: 121 MG/DL (ref 70–110)
HCT VFR BLD AUTO: 39.1 % (ref 40–54)
HGB BLD-MCNC: 13.7 G/DL (ref 14–18)
IMM GRANULOCYTES # BLD AUTO: 0.11 K/UL (ref 0–0.04)
IMM GRANULOCYTES NFR BLD AUTO: 0.5 % (ref 0–0.5)
LYMPHOCYTES # BLD AUTO: 1.4 K/UL (ref 1–4.8)
LYMPHOCYTES NFR BLD: 6.2 % (ref 18–48)
MAGNESIUM SERPL-MCNC: 1.8 MG/DL (ref 1.6–2.6)
MCH RBC QN AUTO: 31.6 PG (ref 27–31)
MCHC RBC AUTO-ENTMCNC: 35 G/DL (ref 32–36)
MCV RBC AUTO: 90 FL (ref 82–98)
MONOCYTES # BLD AUTO: 1.9 K/UL (ref 0.3–1)
MONOCYTES NFR BLD: 8.7 % (ref 4–15)
NEUTROPHILS # BLD AUTO: 18.7 K/UL (ref 1.8–7.7)
NEUTROPHILS NFR BLD: 84.5 % (ref 38–73)
NRBC BLD-RTO: 0 /100 WBC
PHOSPHATE SERPL-MCNC: 3 MG/DL (ref 2.7–4.5)
PLATELET # BLD AUTO: 329 K/UL (ref 150–450)
PMV BLD AUTO: 10.4 FL (ref 9.2–12.9)
POCT GLUCOSE: 137 MG/DL (ref 70–110)
POTASSIUM SERPL-SCNC: 3.7 MMOL/L (ref 3.5–5.1)
RBC # BLD AUTO: 4.34 M/UL (ref 4.6–6.2)
SODIUM SERPL-SCNC: 133 MMOL/L (ref 136–145)
WBC # BLD AUTO: 22.11 K/UL (ref 3.9–12.7)

## 2024-12-06 PROCEDURE — 63600175 PHARM REV CODE 636 W HCPCS

## 2024-12-06 PROCEDURE — 84100 ASSAY OF PHOSPHORUS: CPT | Performed by: SURGERY

## 2024-12-06 PROCEDURE — 63600175 PHARM REV CODE 636 W HCPCS: Performed by: SURGERY

## 2024-12-06 PROCEDURE — 80048 BASIC METABOLIC PNL TOTAL CA: CPT | Performed by: SURGERY

## 2024-12-06 PROCEDURE — 25000003 PHARM REV CODE 250

## 2024-12-06 PROCEDURE — 25000003 PHARM REV CODE 250: Performed by: SURGERY

## 2024-12-06 PROCEDURE — 36415 COLL VENOUS BLD VENIPUNCTURE: CPT | Performed by: SURGERY

## 2024-12-06 PROCEDURE — 83735 ASSAY OF MAGNESIUM: CPT | Performed by: SURGERY

## 2024-12-06 PROCEDURE — 85025 COMPLETE CBC W/AUTO DIFF WBC: CPT | Performed by: SURGERY

## 2024-12-06 RX ADMIN — GABAPENTIN 300 MG: 250 SOLUTION ORAL at 09:12

## 2024-12-06 RX ADMIN — ONDANSETRON 8 MG: 2 INJECTION INTRAMUSCULAR; INTRAVENOUS at 11:12

## 2024-12-06 RX ADMIN — ONDANSETRON 8 MG: 2 INJECTION INTRAMUSCULAR; INTRAVENOUS at 05:12

## 2024-12-06 RX ADMIN — ACETAMINOPHEN 999.01 MG: 650 SOLUTION ORAL at 01:12

## 2024-12-06 RX ADMIN — ONDANSETRON 8 MG: 2 INJECTION INTRAMUSCULAR; INTRAVENOUS at 12:12

## 2024-12-06 RX ADMIN — ENOXAPARIN SODIUM 60 MG: 60 INJECTION SUBCUTANEOUS at 09:12

## 2024-12-06 RX ADMIN — PRAVASTATIN SODIUM 40 MG: 40 TABLET ORAL at 09:12

## 2024-12-06 RX ADMIN — ACETAMINOPHEN 999.01 MG: 650 SOLUTION ORAL at 05:12

## 2024-12-06 RX ADMIN — LEVOTHYROXINE SODIUM 125 MCG: 125 TABLET ORAL at 05:12

## 2024-12-06 RX ADMIN — PANTOPRAZOLE SODIUM 40 MG: 40 INJECTION, POWDER, LYOPHILIZED, FOR SOLUTION INTRAVENOUS at 09:12

## 2024-12-06 RX ADMIN — METHOCARBAMOL 500 MG: 100 INJECTION INTRAMUSCULAR; INTRAVENOUS at 06:12

## 2024-12-06 RX ADMIN — ASPIRIN 81 MG: 81 TABLET, COATED ORAL at 09:12

## 2024-12-06 NOTE — HOSPITAL COURSE
For details of hospital stay, please refer to daily progress notes. Briefly, this is a 54 y.o. male presented on 12/5 for planned surgical intervention. Patient was taken to OR and underwent laparoscopic aurelio-en-y gastroenterostomy. Post-operatively patient was admitted to the floor and had an uncomplicated hospital recovery.     On the day of discharge, the patient was ambulating without difficulty, voiding spontaneously, was tolerating a diet without nausea or vomiting, and pain was well controlled on PO pain medications. Discharge instructions were explained to the patient and appropriate follow-up was arranged.

## 2024-12-06 NOTE — SUBJECTIVE & OBJECTIVE
Interval History: NAEO. AF, VSS. Tolerated water protocol without n/v. Having some belching. Not yet passing gas. Has ambulated.     Medications:  Continuous Infusions:   0.9% NaCl   Intravenous Continuous        lactated ringers   Intravenous Continuous 125 mL/hr at 12/05/24 1340 New Bag at 12/05/24 1340     Scheduled Meds:   acetaminophen  999.0148 mg Oral Q8H    aspirin  81 mg Oral Daily    enoxparin  60 mg Subcutaneous Q12H    gabapentin  300 mg Oral BID    levothyroxine  125 mcg Oral Before breakfast    methocarbamol (ROBAXIN) IVPB  500 mg Intravenous Q8H    ondansetron  8 mg Intravenous Q6H    pantoprazole  40 mg Intravenous BID    pravastatin  40 mg Oral Daily    scopolamine  1 patch Transdermal Once     PRN Meds:  Current Facility-Administered Medications:     acetaminophen, 499.5074 mg, Oral, Q24H PRN    oxyCODONE, 5 mg, Oral, Q6H PRN    prochlorperazine, 5 mg, Intravenous, Q6H PRN    simethicone, 40 mg, Oral, QID PRN     Review of patient's allergies indicates:  No Known Allergies  Objective:     Vital Signs (Most Recent):  Temp: 98 °F (36.7 °C) (12/06/24 0550)  Pulse: 74 (12/06/24 0543)  Resp: 18 (12/06/24 0543)  BP: (!) 164/75 (12/06/24 0543)  SpO2: 95 % (12/06/24 0543) Vital Signs (24h Range):  Temp:  [97.2 °F (36.2 °C)-99.2 °F (37.3 °C)] 98 °F (36.7 °C)  Pulse:  [65-80] 74  Resp:  [14-24] 18  SpO2:  [95 %-100 %] 95 %  BP: (126-179)/() 164/75     Weight: (!) 176.3 kg (388 lb 10.7 oz)  Body mass index is 60.87 kg/m².    Intake/Output - Last 3 Shifts         12/04 0700  12/05 0659 12/05 0700  12/06 0659    IV Piggyback  1200    Total Intake(mL/kg)  1200 (6.8)    Net  +1200          Urine Occurrence  2 x    Stool Occurrence  0 x             Physical Exam  Vitals and nursing note reviewed.   Constitutional:       General: He is not in acute distress.     Appearance: He is obese. He is not ill-appearing.   Cardiovascular:      Rate and Rhythm: Normal rate.   Pulmonary:      Effort: No respiratory  distress.   Abdominal:      General: There is no distension.      Palpations: Abdomen is soft.      Comments: Soft, nondistended. Appropriately tender. Incisions c/d/I with minimal strike thorough on steris   Skin:     General: Skin is warm and dry.   Neurological:      General: No focal deficit present.      Mental Status: He is alert.          Significant Labs:  I have reviewed all pertinent lab results within the past 24 hours.  CBC:   Recent Labs   Lab 12/06/24  0235   WBC 22.11*   RBC 4.34*   HGB 13.7*   HCT 39.1*      MCV 90   MCH 31.6*   MCHC 35.0     CMP:   Recent Labs   Lab 12/06/24  0235   *   CALCIUM 8.7   *   K 3.7   CO2 20*      BUN 11   CREATININE 1.1       Significant Diagnostics:  I have reviewed all pertinent imaging results/findings within the past 24 hours.

## 2024-12-06 NOTE — ASSESSMENT & PLAN NOTE
54 y.o. male with history of CAD s/p PCI 2022 (on plavix) and obesity now s/p laparoscopic RYGB on 12/5/24.     - tolerated water protocol, continue pathway              - advance to bbariatric clear diet  - CRUSH or open all meds. Liquid or crushed meds only. No pills  - scheduled anti-emetics with PRN breakthrough  - MMPC  - home meds restarted as appropriate  - discuss timing to restart Plavix  - OOB, ambulate in halls  - encourage IS  - DVT ppx     Dispo: likely d/c later today if tolerates diet

## 2024-12-06 NOTE — ANESTHESIA POSTPROCEDURE EVALUATION
Anesthesia Post Evaluation    Patient: Hayden Puckett    Procedure(s) Performed: Procedure(s) (LRB):  GASTROENTEROSTOMY, LAPAROSCOPIC W/ INTRA OP EGD (N/A)    Final Anesthesia Type: general      Patient location during evaluation: PACU  Patient participation: Yes- Able to Participate  Level of consciousness: awake and alert  Post-procedure vital signs: reviewed and stable  Pain management: adequate  Airway patency: patent    PONV status at discharge: No PONV  Anesthetic complications: no      Cardiovascular status: blood pressure returned to baseline and hemodynamically stable  Respiratory status: unassisted  Hydration status: euvolemic  Follow-up not needed.              Vitals Value Taken Time   /83 12/06/24 0731   Temp 37.1 °C (98.7 °F) 12/06/24 0731   Pulse 73 12/06/24 0731   Resp 19 12/06/24 0731   SpO2 96 % 12/06/24 0731         Event Time   Out of Recovery 14:19:00         Pain/Kelly Score: Pain Rating Prior to Med Admin: 5 (12/6/2024  5:50 AM)  Pain Rating Post Med Admin: 0 (12/5/2024 10:55 AM)  Kelly Score: 10 (12/5/2024  1:45 PM)

## 2024-12-06 NOTE — DISCHARGE SUMMARY
[facility]  General Surgery  Discharge Summary      Patient Name: Hayden Puckett  MRN: [id number]  Admission Date: 12/5/2024  Hospital Length of Stay: 1 days  Discharge Date and Time:  12/06/2024 3:49 PM  Attending Physician: Darien Bai Jr.*   Discharging Provider: Gary Leon MD  Primary Care Provider: Alex Calvillo Sr., MD    HPI:   No notes on file    Procedure(s) (LRB):  GASTROENTEROSTOMY, LAPAROSCOPIC W/ INTRA OP EGD (N/A)      Indwelling Lines/Drains at time of discharge:   Lines/Drains/Airways       None                 Hospital Course: For details of hospital stay, please refer to daily progress notes. Briefly, this is a 54 y.o. male presented on 12/5 for planned surgical intervention. Patient was taken to OR and underwent laparoscopic aurelio-en-y gastroenterostomy. Post-operatively patient was admitted to the floor and had an uncomplicated hospital recovery.     On the day of discharge, the patient was ambulating without difficulty, voiding spontaneously, was tolerating a diet without nausea or vomiting, and pain was well controlled on PO pain medications. Discharge instructions were explained to the patient and appropriate follow-up was arranged.      Goals of Care Treatment Preferences:         Consults:     Significant Diagnostic Studies: N/A    Pending Diagnostic Studies:       None          Final Active Diagnoses:    Diagnosis Date Noted POA    PRINCIPAL PROBLEM:  Obesity [E66.9] 12/05/2024 Yes      Problems Resolved During this Admission:      Discharged Condition: good    Disposition: Home or Self Care    Follow Up:   Follow-up Information       Darien Bai Jr., MD Follow up in 2 week(s).    Specialties: General Surgery, Bariatrics  Contact information:  [facility]  [facility] [id number]  [phone]                           Patient Instructions:   No discharge procedures on file.  Medications:  Reconciled Home Medications:      Medication List         ASK your doctor about these medications      amLODIPine-benazepriL 5-40 mg per capsule  Commonly known as: LOTREL  Take 1 capsule by mouth 2 (two) times daily.     * aspirin 81 MG EC tablet  Commonly known as: ECOTRIN  Take 1 tablet (81 mg total) by mouth once daily.     * aspirin 81 MG EC tablet  Commonly known as: ECOTRIN  Take 81 mg by mouth once daily.     cholecalciferol (vitamin D3) 50 mcg (2,000 unit) Cap capsule  Commonly known as: VITAMIN D3  Take by mouth once daily.     clopidogreL 75 mg tablet  Commonly known as: PLAVIX  Take 75 mg by mouth once daily.     gabapentin 300 MG capsule  Commonly known as: NEURONTIN  OPEN capsule into a tablespoon and consume with sip of liquid. Take once the MORNING OF SURGERY. After surgery: take one capsule TWICE DAILY for at least 3 days and up to 5 days as needed for pain.     hydroCHLOROthiazide 25 MG tablet  Commonly known as: HYDRODIURIL  Take 1 tablet (25 mg total) by mouth once daily.     levothyroxine 125 MCG tablet  Commonly known as: SYNTHROID  Take 1 tablet (125 mcg total) by mouth before breakfast.     omeprazole 40 MG capsule  Commonly known as: PRILOSEC  Take 1 capsule (40 mg total) by mouth every morning. Open capsule and take with apple sauce     ondansetron 8 MG Tbdl  Commonly known as: ZOFRAN-ODT  Take 1 tablet (8 mg total) by mouth every 6 (six) hours as needed (Nausea).     polyethylene glycol 17 gram/dose powder  Commonly known as: GLYCOLAX  Use cap to measure 17 g, mix with liquid, and take by mouth once daily.     pravastatin 40 MG tablet  Commonly known as: PRAVACHOL  Take 40 mg by mouth once daily.     promethazine 12.5 MG Supp  Commonly known as: PHENERGAN  Unwrap and insert 1 suppository (12.5 mg total) rectally every 6 (six) hours as needed (nausea, 2nd line).     psyllium powder  Commonly known as: METAMUCIL  Take 1 packet by mouth once daily.     ranolazine 1,000 mg Tb12  Commonly known as: RANEXA  Take 1,000 mg by mouth 2 (two) times  daily.     TYLENOL EXTRA STRENGTH 500 mg Pwpk  Generic drug: acetaminophen  Take 1,000 mg by mouth every 8 (eight) hours. Take 1g (2 packets) every 8 hours for at least 3 days and up to 5 days as needed. May take one additional packet (500mg) per day for breakthrough.  Do not exceed 4g in 24 hours. for 5 days     ursodioL 500 MG tablet  Commonly known as: ACTIGALL  Take 1 tablet (500 mg total) by mouth once daily. For gallstone prevention.           * This list has 2 medication(s) that are the same as other medications prescribed for you. Read the directions carefully, and ask your doctor or other care provider to review them with you.                Time spent on the discharge of patient: 25 minutes    Gary Leon MD  General Surgery  [facility] - Surgery

## 2024-12-06 NOTE — PROGRESS NOTES
[facility]  General Surgery  Progress Note    Subjective:     History of Present Illness:  No notes on file    Post-Op Info:  Procedure(s) (LRB):  GASTROENTEROSTOMY, LAPAROSCOPIC W/ INTRA OP EGD (N/A)   1 Day Post-Op     Interval History: NAEO. AF, VSS. Tolerated water protocol without n/v. Having some belching. Not yet passing gas. Has ambulated.     Medications:  Continuous Infusions:   0.9% NaCl   Intravenous Continuous        lactated ringers   Intravenous Continuous 125 mL/hr at 12/05/24 1340 New Bag at 12/05/24 1340     Scheduled Meds:   acetaminophen  999.0148 mg Oral Q8H    aspirin  81 mg Oral Daily    enoxparin  60 mg Subcutaneous Q12H    gabapentin  300 mg Oral BID    levothyroxine  125 mcg Oral Before breakfast    methocarbamol (ROBAXIN) IVPB  500 mg Intravenous Q8H    ondansetron  8 mg Intravenous Q6H    pantoprazole  40 mg Intravenous BID    pravastatin  40 mg Oral Daily    scopolamine  1 patch Transdermal Once     PRN Meds:  Current Facility-Administered Medications:     acetaminophen, 499.5074 mg, Oral, Q24H PRN    oxyCODONE, 5 mg, Oral, Q6H PRN    prochlorperazine, 5 mg, Intravenous, Q6H PRN    simethicone, 40 mg, Oral, QID PRN     Review of patient's allergies indicates:  No Known Allergies  Objective:     Vital Signs (Most Recent):  Temp: 98 °F (36.7 °C) (12/06/24 0550)  Pulse: 74 (12/06/24 0543)  Resp: 18 (12/06/24 0543)  BP: (!) 164/75 (12/06/24 0543)  SpO2: 95 % (12/06/24 0543) Vital Signs (24h Range):  Temp:  [97.2 °F (36.2 °C)-99.2 °F (37.3 °C)] 98 °F (36.7 °C)  Pulse:  [65-80] 74  Resp:  [14-24] 18  SpO2:  [95 %-100 %] 95 %  BP: (126-179)/() 164/75     Weight: (!) 176.3 kg (388 lb 10.7 oz)  Body mass index is 60.87 kg/m².    Intake/Output - Last 3 Shifts         12/04 0700 12/05 0659 12/05 0700 12/06 0659    IV Piggyback  1200    Total Intake(mL/kg)  1200 (6.8)    Net  +1200          Urine Occurrence  2 x    Stool Occurrence  0 x             Physical Exam  Vitals and nursing  note reviewed.   Constitutional:       General: He is not in acute distress.     Appearance: He is obese. He is not ill-appearing.   Cardiovascular:      Rate and Rhythm: Normal rate.   Pulmonary:      Effort: No respiratory distress.   Abdominal:      General: There is no distension.      Palpations: Abdomen is soft.      Comments: Soft, nondistended. Appropriately tender. Incisions c/d/I with minimal strike thorough on steris   Skin:     General: Skin is warm and dry.   Neurological:      General: No focal deficit present.      Mental Status: He is alert.          Significant Labs:  I have reviewed all pertinent lab results within the past 24 hours.  CBC:   Recent Labs   Lab 12/06/24  0235   WBC 22.11*   RBC 4.34*   HGB 13.7*   HCT 39.1*      MCV 90   MCH 31.6*   MCHC 35.0     CMP:   Recent Labs   Lab 12/06/24  0235   *   CALCIUM 8.7   *   K 3.7   CO2 20*      BUN 11   CREATININE 1.1       Significant Diagnostics:  I have reviewed all pertinent imaging results/findings within the past 24 hours.  Assessment/Plan:     * Obesity  54 y.o. male with history of CAD s/p PCI 2022 (on plavix) and obesity now s/p laparoscopic RYGB on 12/5/24.     - tolerated water protocol, continue pathway              - advance to bbariatric clear diet  - CRUSH or open all meds. Liquid or crushed meds only. No pills  - scheduled anti-emetics with PRN breakthrough  - MMPC  - home meds restarted as appropriate  - discuss timing to restart Plavix  - OOB, ambulate in halls  - encourage IS  - DVT ppx     Dispo: likely d/c later today if tolerates diet           Martina Peguero MD  General Surgery  [facility]

## 2024-12-06 NOTE — PLAN OF CARE
Problem: Adult Inpatient Plan of Care  Goal: Plan of Care Review  Outcome: Progressing  Goal: Patient-Specific Goal (Individualized)  Outcome: Progressing  Goal: Absence of Hospital-Acquired Illness or Injury  Outcome: Progressing  Goal: Optimal Comfort and Wellbeing  Outcome: Progressing  Goal: Readiness for Transition of Care  Outcome: Progressing     Problem: Bariatric Environmental Safety  Goal: Safety Maintained with Care  Outcome: Progressing     Problem: Skin Injury Risk Increased  Goal: Skin Health and Integrity  Outcome: Progressing     Problem: Wound  Goal: Optimal Coping  Outcome: Progressing  Goal: Optimal Functional Ability  Outcome: Progressing  Goal: Absence of Infection Signs and Symptoms  Outcome: Progressing  Goal: Improved Oral Intake  Outcome: Progressing  Goal: Optimal Pain Control and Function  Outcome: Progressing  Goal: Skin Health and Integrity  Outcome: Progressing  Goal: Optimal Wound Healing  Outcome: Progressing     Problem: Fall Injury Risk  Goal: Absence of Fall and Fall-Related Injury  Outcome: Progressing     Pt is AAO x4 and was cooperative to receiving care. Pt c/o of soreness r/t post-op pain and pressure of abdomen as scheduled and PRN medications were admin. During the night post-op interventions were implemented such as water protocol and ambulation. Pt tolerated protocol as it was completed w/o complications. Pt voided twice (2100) and (0230) independently via ambulation to bathroom. Intervention was tolerated. Safety and risk for falls education was implemented as bed was lowered. Side rails up x2 with call light in reach. Continue plan of care.

## 2024-12-07 ENCOUNTER — NURSE TRIAGE (OUTPATIENT)
Dept: ADMINISTRATIVE | Facility: CLINIC | Age: 54
End: 2024-12-07
Payer: COMMERCIAL

## 2024-12-07 NOTE — TELEPHONE ENCOUNTER
Spoke with pt who states he was recently discharged from hospital States he was told to restart plavix today. Asking if he is to start taking normal medications again. Pt advised based on AVS in chart. Pt verbalized understanding.  Reason for Disposition   Caller has medicine question only, adult not sick, AND triager answers question    Additional Information   Negative: [1] Intentional drug overdose AND [2] suicidal thoughts or ideas   Negative: MORE THAN A DOUBLE DOSE of a prescription or over-the-counter (OTC) drug   Negative: [1] DOUBLE DOSE (an extra dose or lesser amount) of prescription drug AND [2] any symptoms (e.g., dizziness, nausea, pain, sleepiness)   Negative: [1] DOUBLE DOSE (an extra dose or lesser amount) of over-the-counter (OTC) drug AND [2] any symptoms (e.g., dizziness, nausea, pain, sleepiness)   Negative: Took another person's prescription drug   Negative: [1] DOUBLE DOSE (an extra dose or lesser amount) of prescription drug AND [2] NO symptoms  (Exception: A double dose of antibiotics.)   Negative: Diabetes drug error or overdose (e.g., took wrong type of insulin or took extra dose)   Negative: [1] Prescription not at pharmacy AND [2] was prescribed by PCP recently (Exception: Triager has access to EMR and prescription is recorded there. Go to Home Care and confirm for pharmacy.)   Negative: [1] Pharmacy calling with prescription question AND [2] triager unable to answer question   Negative: [1] Caller has URGENT medicine question about med that PCP or specialist prescribed AND [2] triager unable to answer question   Negative: Medicine patch causing local rash or itching   Negative: [1] Caller has medicine question about med NOT prescribed by PCP AND [2] triager unable to answer question (e.g., compatibility with other med, storage)   Negative: Prescription request for new medicine (not a refill)   Negative: [1] Caller has NON-URGENT medicine question about med that PCP prescribed AND [2]  triager unable to answer question   Negative: Caller wants to use a complementary or alternative medicine   Negative: [1] Prescription prescribed recently is not at pharmacy AND [2] triager has access to patient's EMR AND [3] prescription is recorded in the EMR   Negative: [1] DOUBLE DOSE (an extra dose or lesser amount) of over-the-counter (OTC) drug AND [2] NO symptoms   Negative: [1] DOUBLE DOSE (an extra dose or lesser amount) of antibiotic drug AND [2] NO symptoms    Protocols used: Medication Question Call-A-AH

## 2024-12-09 ENCOUNTER — CLINICAL SUPPORT (OUTPATIENT)
Dept: BARIATRICS | Facility: CLINIC | Age: 54
End: 2024-12-09
Payer: COMMERCIAL

## 2024-12-09 ENCOUNTER — TELEPHONE (OUTPATIENT)
Dept: FAMILY MEDICINE | Facility: CLINIC | Age: 54
End: 2024-12-09
Payer: COMMERCIAL

## 2024-12-09 VITALS
OXYGEN SATURATION: 99 % | TEMPERATURE: 98 F | WEIGHT: 315 LBS | HEART RATE: 67 BPM | DIASTOLIC BLOOD PRESSURE: 56 MMHG | SYSTOLIC BLOOD PRESSURE: 118 MMHG | RESPIRATION RATE: 20 BRPM | BODY MASS INDEX: 59.52 KG/M2

## 2024-12-09 DIAGNOSIS — E66.01 MORBID OBESITY: ICD-10-CM

## 2024-12-09 DIAGNOSIS — G47.33 OSA TREATED WITH BIPAP: ICD-10-CM

## 2024-12-09 DIAGNOSIS — I25.10 CORONARY ARTERY DISEASE INVOLVING NATIVE CORONARY ARTERY OF NATIVE HEART WITHOUT ANGINA PECTORIS: ICD-10-CM

## 2024-12-09 DIAGNOSIS — I10 HYPERTENSION, UNSPECIFIED TYPE: ICD-10-CM

## 2024-12-09 PROCEDURE — 99999 PR PBB SHADOW E&M-EST. PATIENT-LVL IV: CPT | Mod: PBBFAC,COE,,

## 2024-12-09 NOTE — TELEPHONE ENCOUNTER
Spoke with patient about hospital visit and pt said that his question about Plavix was resolved. Pt will be having gastric bypass surgery on Thursday, 12/12/24. Instructed pt to call if he had any other questions. Pt confirmed understanding.

## 2024-12-09 NOTE — PROGRESS NOTES
PT AMBULATED TO ROOM #11 IN THE CLINIC W/O ANY PROBLEMS W/ HIS WIFE.  NAUSEA/VOMITING   NONE AT ALL  Which preop did you drink ENSURE SGY 1ST  FEVERS N  PROBLEMS URINATING N  TACHYCARDIA N  BM BLACK/TARRY CALLED MD ON CALL AND THEY TOLD HIM THIS WAS NORMAL  SITES X5  S/S OF INFECTION- CDI  TOLERATING FLUIDS         34-48      WATER             70           PROTEIN  ENCOURAGE 30G-40G/DAYPROTEIN AND 48 OZ /DAY WATER   Increase to 64 ounces /day and 60 grams of protein by 1 week post op appt.   VITAMINS - start DAY OF POST OP APPT. WITH DIET. dont take mvi w/ iron w/ ca or vit d wait 2 hours to take. You may take it with your B complexes  PAIN 0/10 scale where 0 POSITIONAL AT NIGHT  AMBULATING Y  SHOWER 12/7  REINFORCED NO TUB BATHS FOR 2 WEEKS 12/18  INSTRUCTED ON S/S OF INFECTION Y  REITERATED TO CALL ME ON MY CELL [phone] IF ANYTHING OUT OF THE ORDINARY OCCURS AT NIGHT OR DURING THE DAY AFTER D/C TO HOME /[facility]  NSTRUCTED PT TO BRING PCP AGREEMENT W/ THEM TO THEIR POST OP APPTS. SO THE PCP CAN SEE WHAT LABS NEED TO BE DRAWN AND TO GET THEM TO FAX US THE RESULTS AND THE OV NOTES FOR EACH VISIT

## 2024-12-10 ENCOUNTER — TELEPHONE (OUTPATIENT)
Dept: BARIATRICS | Facility: CLINIC | Age: 54
End: 2024-12-10
Payer: COMMERCIAL

## 2024-12-10 ENCOUNTER — PATIENT MESSAGE (OUTPATIENT)
Dept: BARIATRICS | Facility: CLINIC | Age: 54
End: 2024-12-10
Payer: COMMERCIAL

## 2024-12-10 NOTE — TELEPHONE ENCOUNTER
----- Message from Summer sent at 12/10/2024  2:12 PM CST -----  .Type:  Patient Call Back    Who Called: PT       Does the patient know what this is regarding?: DISABILITY PAPERWORK RETURN TO WORK DATE CORRECTION. PT NEEDS TO SPEAK WITH NURSE GAURANG ZHANG     Would the patient rather a call back  YES     Best Call Back Number:  [phone]    Additional Information: Thank You

## 2024-12-10 NOTE — TELEPHONE ENCOUNTER
----- Message from Tricia Lundberg sent at 12/10/2024  1:20 PM CST -----  Regarding: Advise  Contact: [phone]  Type:  Advice    Who Called: Patient     Would the patient rather a call back or a response via [facility]? Call Back    Best Call Back Number: [phone]    Additional Information: Patient is calling stating that provider wrote a return date on 12/12/24 on both disability paperwork and return to work form. Pt recently had surgery done on 12/5/24. Pt is stating that this date is incorrect and he was told by provider that he will return to work on 1/16/24. Pt is asking for a return call from office.

## 2024-12-11 ENCOUNTER — OFFICE VISIT (OUTPATIENT)
Dept: BARIATRICS | Facility: CLINIC | Age: 54
End: 2024-12-11
Payer: COMMERCIAL

## 2024-12-11 ENCOUNTER — DOCUMENTATION ONLY (OUTPATIENT)
Dept: BARIATRICS | Facility: CLINIC | Age: 54
End: 2024-12-11
Payer: COMMERCIAL

## 2024-12-11 ENCOUNTER — CLINICAL SUPPORT (OUTPATIENT)
Dept: BARIATRICS | Facility: CLINIC | Age: 54
End: 2024-12-11
Payer: COMMERCIAL

## 2024-12-11 VITALS
SYSTOLIC BLOOD PRESSURE: 141 MMHG | HEART RATE: 72 BPM | DIASTOLIC BLOOD PRESSURE: 72 MMHG | WEIGHT: 315 LBS | OXYGEN SATURATION: 100 % | BODY MASS INDEX: 60.94 KG/M2 | TEMPERATURE: 99 F

## 2024-12-11 DIAGNOSIS — Z98.84 S/P BARIATRIC SURGERY: ICD-10-CM

## 2024-12-11 DIAGNOSIS — Z09 POSTOP CHECK: Primary | ICD-10-CM

## 2024-12-11 DIAGNOSIS — Z71.3 DIETARY COUNSELING AND SURVEILLANCE: Primary | ICD-10-CM

## 2024-12-11 PROCEDURE — 99999 PR PBB SHADOW E&M-EST. PATIENT-LVL IV: CPT | Mod: PBBFAC,COE,, | Performed by: SURGERY

## 2024-12-11 PROCEDURE — 99999 PR PBB SHADOW E&M-EST. PATIENT-LVL I: CPT | Mod: PBBFAC,COE,, | Performed by: DIETITIAN, REGISTERED

## 2024-12-11 PROCEDURE — 99024 POSTOP FOLLOW-UP VISIT: CPT | Mod: S$GLB,COE,, | Performed by: SURGERY

## 2024-12-11 PROCEDURE — 99499 UNLISTED E&M SERVICE: CPT | Mod: S$GLB,COE,, | Performed by: DIETITIAN, REGISTERED

## 2024-12-11 NOTE — PROGRESS NOTES
BARIATRIC POST-OPERATIVE FOLLOW UP:    HPI:  54 y.o. male presents for one week fu from LRYGB.  Overall doing well.  Nate liquids and protein.  His only complaints is some pain in his thighs that are worse with exercise.  No complaints of cramping.    Denies: nausea, vomiting, abdominal pain, changes in bowel movement pattern, fever, chills, dysphagia, chest pain, and shortness of breath.    PHYSICAL EXAM:  BP (!) 141/72   Pulse 72   Temp 98.6 °F (37 °C)   Wt (!) 176.5 kg (389 lb 1.8 oz)   SpO2 100%   BMI 60.94 kg/m²      Weight History Current Weight Total Weight Loss   12/4/2024   3:01  lbs -394 lbs       GENERAL: In NAD, A&O x3  ABDOMEN: Soft, non-tender, non-distended. Well-healing surgical scars are clean, dry, and intact without signs of infection or bleeding.  EXTREMITIES: No clubbing, cyanosis, or edema.      ASSESSMENT:  - Morbid obesity s/p laparoscopic Hugo-en-Y .    PLAN:  - Emphasized the importance of regular exercise and adherence to bariatric diet to achieve maximum weight loss.  - Encouraged patient to begin OR continue regular exercise.  - Follow-up with dietician to reinforce diet.  - Continue daily vitamins and medications.  - Call the office for any issues.  - Check labs today secondary to thigh pain.

## 2024-12-11 NOTE — PROGRESS NOTES
NUTRITION NOTE    Referring Physician: Darien Bai M.D.   Reason for MNT Referral: Follow-up 1 Week s/p laproscopic Hugo-en-Y gastric bypass    CURRENT DIET:  Bariatric Liquid Diet    Dehydration assessment:  Urine output/color: yellow/clear  Chest pain:n  Persistent increased heart rate:n  Fatigue:n  Dizzy/weak: n  N/V: n  BM: y    Protein and fluid intake assessment: (food diary)    Fluids include: variety  Fluid intake yesterday: 64floz  Protein supplements: Premier shakes 1/day and Gladiator smoothie w/o the fruit, 2-3/day Protein 2.0  Protein intake yesterday: 105g    Vitamins - plans to start tomorrow  Multivitamin with iron, 2 per day - Oziel Melts  B-1/Super B complex 100 mg, 1/2 per day or every other day  Calcium Citrate 500 mg with vitamin D, 3 per day - Oziel Melts  Vitamin B-12 2500 mcg, 1 per week    Medications  Omeprazole: y  How are you tolerating pain at this time? (rate on a scale from 1 to 10; >7 notify PA/MD): 3-4  Did you take your acetaminophen and gabapentin for 3 days? yes  Did you have to take additional acetaminophen for break through pain (pain scale 4-6)? no  Did you have any severe pain that required oxycodone? no    How is the support system at home? good  Exercise reminder (light exercise at this time, no lifting above 10 lbs)     Questions for nurse/MA/PA: no    BARIATRIC DIET DISCUSSION:  Reinforced post-op nutrition guidelines.  Continue to work on fluid and protein intake.    PLAN/RECOMMONDATIONS:  Continue bariatric high protein liquid diet and progress diet as directed following bariatric nutrition guidebook.  Maintain protein intake or increase gradually to goal of  g prot/day.  Maintain fluid intake or increase gradually to goal of 64 floz/day.  Begin appropriate vitamins & minerals.  Begin or continue light exercise.     Confirmed date and time for 2 week po labs and clinic visit     SESSION TIME: 30 minutes

## 2024-12-11 NOTE — PROGRESS NOTES
12-11-24 clinic:  Per Dr. Bai  Postop labs  Bariatric vitamin smart set labs  Go over Bariatric postop medications    *labs scheduled at [facility] per pt's request*

## 2024-12-12 ENCOUNTER — LAB VISIT (OUTPATIENT)
Dept: LAB | Facility: HOSPITAL | Age: 54
End: 2024-12-12
Attending: SURGERY
Payer: COMMERCIAL

## 2024-12-12 DIAGNOSIS — Z09 POSTOP CHECK: ICD-10-CM

## 2024-12-12 LAB
25(OH)D3+25(OH)D2 SERPL-MCNC: 49 NG/ML (ref 30–80)
ALBUMIN SERPL-MCNC: 3.8 G/DL (ref 3.5–5)
ALBUMIN/GLOB SERPL: 1.2 RATIO (ref 1.1–2)
ALP SERPL-CCNC: 65 UNIT/L (ref 40–150)
ALT SERPL-CCNC: 12 UNIT/L (ref 0–55)
ANION GAP SERPL CALC-SCNC: 8 MEQ/L
AST SERPL-CCNC: 12 UNIT/L (ref 5–34)
BASOPHILS # BLD AUTO: 0.07 X10(3)/MCL
BASOPHILS NFR BLD AUTO: 0.6 %
BILIRUB SERPL-MCNC: 0.4 MG/DL
BUN SERPL-MCNC: 12 MG/DL (ref 8.4–25.7)
CALCIUM SERPL-MCNC: 9 MG/DL (ref 8.4–10.2)
CHLORIDE SERPL-SCNC: 104 MMOL/L (ref 98–107)
CHOLEST SERPL-MCNC: 112 MG/DL
CHOLEST/HDLC SERPL: 4 {RATIO} (ref 0–5)
CO2 SERPL-SCNC: 25 MMOL/L (ref 22–29)
CREAT SERPL-MCNC: 0.98 MG/DL (ref 0.72–1.25)
CREAT/UREA NIT SERPL: 12
EOSINOPHIL # BLD AUTO: 0.19 X10(3)/MCL (ref 0–0.9)
EOSINOPHIL NFR BLD AUTO: 1.7 %
ERYTHROCYTE [DISTWIDTH] IN BLOOD BY AUTOMATED COUNT: 13.4 % (ref 11.5–17)
GFR SERPLBLD CREATININE-BSD FMLA CKD-EPI: >60 ML/MIN/1.73/M2
GLOBULIN SER-MCNC: 3.3 GM/DL (ref 2.4–3.5)
GLUCOSE SERPL-MCNC: 88 MG/DL (ref 74–100)
HCT VFR BLD AUTO: 37.9 % (ref 42–52)
HDLC SERPL-MCNC: 27 MG/DL (ref 35–60)
HGB BLD-MCNC: 12.5 G/DL (ref 14–18)
IMM GRANULOCYTES # BLD AUTO: 0.31 X10(3)/MCL (ref 0–0.04)
IMM GRANULOCYTES NFR BLD AUTO: 2.7 %
IRON SATN MFR SERPL: 18 % (ref 20–50)
IRON SERPL-MCNC: 47 UG/DL (ref 65–175)
LDLC SERPL CALC-MCNC: 69 MG/DL (ref 50–140)
LYMPHOCYTES # BLD AUTO: 1.84 X10(3)/MCL (ref 0.6–4.6)
LYMPHOCYTES NFR BLD AUTO: 16.1 %
MCH RBC QN AUTO: 30.8 PG (ref 27–31)
MCHC RBC AUTO-ENTMCNC: 33 G/DL (ref 33–36)
MCV RBC AUTO: 93.3 FL (ref 80–94)
MONOCYTES # BLD AUTO: 1.3 X10(3)/MCL (ref 0.1–1.3)
MONOCYTES NFR BLD AUTO: 11.4 %
NEUTROPHILS # BLD AUTO: 7.7 X10(3)/MCL (ref 2.1–9.2)
NEUTROPHILS NFR BLD AUTO: 67.5 %
NRBC BLD AUTO-RTO: 0 %
PLATELET # BLD AUTO: 429 X10(3)/MCL (ref 130–400)
PMV BLD AUTO: 9.2 FL (ref 7.4–10.4)
POTASSIUM SERPL-SCNC: 4.5 MMOL/L (ref 3.5–5.1)
PROT SERPL-MCNC: 7.1 GM/DL (ref 6.4–8.3)
RBC # BLD AUTO: 4.06 X10(6)/MCL (ref 4.7–6.1)
SODIUM SERPL-SCNC: 137 MMOL/L (ref 136–145)
TIBC SERPL-MCNC: 209 UG/DL (ref 60–240)
TIBC SERPL-MCNC: 256 UG/DL (ref 250–450)
TRANSFERRIN SERPL-MCNC: 248 MG/DL (ref 174–364)
TRIGL SERPL-MCNC: 78 MG/DL (ref 34–140)
VIT B12 SERPL-MCNC: 776 PG/ML (ref 213–816)
VLDLC SERPL CALC-MCNC: 16 MG/DL
WBC # BLD AUTO: 11.41 X10(3)/MCL (ref 4.5–11.5)

## 2024-12-12 PROCEDURE — 36415 COLL VENOUS BLD VENIPUNCTURE: CPT

## 2024-12-12 PROCEDURE — 82306 VITAMIN D 25 HYDROXY: CPT

## 2024-12-12 PROCEDURE — 84425 ASSAY OF VITAMIN B-1: CPT

## 2024-12-12 PROCEDURE — 83540 ASSAY OF IRON: CPT

## 2024-12-12 PROCEDURE — 82607 VITAMIN B-12: CPT

## 2024-12-12 PROCEDURE — 80053 COMPREHEN METABOLIC PANEL: CPT

## 2024-12-12 PROCEDURE — 80061 LIPID PANEL: CPT

## 2024-12-12 PROCEDURE — 85025 COMPLETE CBC W/AUTO DIFF WBC: CPT

## 2024-12-14 LAB — VIT B1 BLD-SCNC: 108 NMOL/L (ref 70–180)

## 2024-12-18 ENCOUNTER — OFFICE VISIT (OUTPATIENT)
Dept: FAMILY MEDICINE | Facility: CLINIC | Age: 54
End: 2024-12-18
Payer: COMMERCIAL

## 2024-12-18 VITALS
DIASTOLIC BLOOD PRESSURE: 80 MMHG | WEIGHT: 315 LBS | HEART RATE: 74 BPM | TEMPERATURE: 98 F | BODY MASS INDEX: 46.65 KG/M2 | RESPIRATION RATE: 20 BRPM | HEIGHT: 69 IN | SYSTOLIC BLOOD PRESSURE: 141 MMHG | OXYGEN SATURATION: 99 %

## 2024-12-18 DIAGNOSIS — Z98.84 HISTORY OF BARIATRIC SURGERY: Primary | ICD-10-CM

## 2024-12-18 PROCEDURE — 3008F BODY MASS INDEX DOCD: CPT | Mod: CPTII,COE,, | Performed by: FAMILY MEDICINE

## 2024-12-18 PROCEDURE — 3044F HG A1C LEVEL LT 7.0%: CPT | Mod: CPTII,COE,, | Performed by: FAMILY MEDICINE

## 2024-12-18 PROCEDURE — 99213 OFFICE O/P EST LOW 20 MIN: CPT | Mod: COE,,, | Performed by: FAMILY MEDICINE

## 2024-12-18 PROCEDURE — 1111F DSCHRG MED/CURRENT MED MERGE: CPT | Mod: CPTII,COE,, | Performed by: FAMILY MEDICINE

## 2024-12-18 PROCEDURE — 4010F ACE/ARB THERAPY RXD/TAKEN: CPT | Mod: CPTII,COE,, | Performed by: FAMILY MEDICINE

## 2024-12-18 PROCEDURE — 3077F SYST BP >= 140 MM HG: CPT | Mod: CPTII,COE,, | Performed by: FAMILY MEDICINE

## 2024-12-18 PROCEDURE — 3079F DIAST BP 80-89 MM HG: CPT | Mod: CPTII,COE,, | Performed by: FAMILY MEDICINE

## 2024-12-18 NOTE — PROGRESS NOTES
Family Medicine    Patient ID: [id number]     Chief Complaint: Follow-up (Post op gastric bypass 2 weeks )      HPI:     Hayden Puckett is a 54 y.o. male here today for for his 1st post bariatric surgery visit.  He is feeling well.  He has lost 9 lb since the day of the surgery.  He is taking all of his recommended vitamins.  His surgical scars are healing well there is no sign of infection or dehiscence.  He is not having any GI upset.  He is also not hungry which is common.  He is still on his liquid diet and he progresses his diet 2 weeks after surgery, and then 2 weeks thereafter he can start the soft bariatric diet.  The post bariatric surgery agenda supplied by his surgeon's office show that he needs for labs today, but it does for labs were drawn 5 days ago.  Nevertheless we will draw them again today so that we can keep ourselves on schedule    Past Medical History:   Diagnosis Date    Coronary artery disease     Diabetes mellitus, type 2     Hyperlipidemia     Hypertension     Hypogonadism in male     Hypothyroidism associated with surgical procedure     Insulin resistance     Obesity,     QUEENIE (obstructive sleep apnea)     Sleep apnea     Vitamin D deficiency         Past Surgical History:   Procedure Laterality Date    COLONOSCOPY N/A 11/23/2022    Procedure: COLON;  Surgeon: Drake Farley MD;  [facility] ENDOSCOPY;  Service: Gastroenterology;  Laterality: N/A;    LAPAROSCOPIC GASTROENTEROSTOMY N/A 12/5/2024    Procedure: GASTROENTEROSTOMY, LAPAROSCOPIC W/ INTRA OP EGD;  Surgeon: Darien Bai Jr., MD;  Location: [facility] OR [address] [place] [address];  Service: General;  Laterality: N/A;  [facility] PT  WAL#  [id number]   BMI 66.33   [employer] REP NOTIFIED anesthesia to complete regional block    LEFT HEART CATHETERIZATION  2022    MANDIBLE FRACTURE SURGERY      THYROID SURGERY      TONSILLECTOMY          Social History     Tobacco Use    Smoking status: Never    Smokeless tobacco: Never   Substance and Sexual  Activity    Alcohol use: Not Currently     Comment: A few times in a year    Drug use: Never    Sexual activity: Not on file        Current Outpatient Medications   Medication Instructions    amLODIPine-benazepriL (LOTREL) 5-40 mg per capsule 1 capsule, Oral, 2 times daily    aspirin (ECOTRIN) 81 mg, Oral, Daily    cholecalciferol, vitamin D3, (VITAMIN D3) 50 mcg (2,000 unit) Cap capsule Daily    clopidogreL (PLAVIX) 75 mg, Daily    gabapentin (NEURONTIN) 300 MG capsule OPEN capsule into a tablespoon and consume with sip of liquid. Take once the MORNING OF SURGERY. After surgery: take one capsule TWICE DAILY for at least 3 days and up to 5 days as needed for pain.    hydroCHLOROthiazide (HYDRODIURIL) 25 mg, Oral, Daily    levothyroxine (SYNTHROID) 125 mcg, Oral, Before breakfast    omeprazole (PRILOSEC) 40 mg, Oral, Every morning, Open capsule and take with apple sauce    ondansetron (ZOFRAN-ODT) 8 mg, Oral, Every 6 hours PRN    polyethylene glycol (GLYCOLAX) 17 gram/dose powder Use cap to measure 17 g, mix with liquid, and take by mouth once daily.    pravastatin (PRAVACHOL) 40 mg, Daily    promethazine (PHENERGAN) 12.5 MG Supp Unwrap and insert 1 suppository (12.5 mg total) rectally every 6 (six) hours as needed (nausea, 2nd line).    psyllium (METAMUCIL) powder 1 packet, Daily    ranolazine (RANEXA) 1,000 mg, 2 times daily    ursodioL (ACTIGALL) 500 mg, Oral, Daily, For gallstone prevention.       Review of patient's allergies indicates:  No Known Allergies     Patient Care Team:  Alex Calvillo Sr., MD as PCP - General (Family Medicine)  Eliud Lepe MD as Consulting Physician (Cardiology)  Dave Bustillo OD (Optometry)  Jhony Vaz III, MD as Consulting Physician (Otolaryngology)  To Vázquez NP (Pulmonary Disease)  Amor Eaton MD as Consulting Physician (Orthopedic Surgery)  DR. keisha Kay (Gastroenterology)  Michelle Javier (Cardiology)     Subjective:     Review  "of Systems   Constitutional:  Negative for activity change and unexpected weight change.   HENT:  Negative for hearing loss, rhinorrhea and trouble swallowing.    Eyes:  Negative for discharge and visual disturbance.   Respiratory:  Negative for chest tightness and wheezing.    Cardiovascular:  Negative for chest pain and palpitations.   Gastrointestinal:  Negative for blood in stool, constipation, diarrhea and vomiting.   Endocrine: Negative for polydipsia and polyuria.   Genitourinary:  Negative for difficulty urinating, hematuria and urgency.   Musculoskeletal:  Negative for arthralgias, joint swelling and neck pain.   Neurological:  Negative for weakness and headaches.   Psychiatric/Behavioral:  Negative for dysphoric mood.        12 point review of systems conducted, negative except as stated in the history of present illness. See HPI for details.    Objective:     Visit Vitals  BP (!) 141/80 (BP Location: Right arm, Patient Position: Sitting)   Pulse 74   Temp 98.2 °F (36.8 °C) (Skin)   Resp 20   Ht 5' 9" (1.753 m)   Wt (!) 172.5 kg (380 lb 6.4 oz)   SpO2 99%   BMI 56.18 kg/m²       Physical Exam  Vitals and nursing note reviewed.   Constitutional:       General: He is not in acute distress.     Appearance: Normal appearance. He is not ill-appearing, toxic-appearing or diaphoretic.   HENT:      Head: Normocephalic and atraumatic.      Right Ear: Tympanic membrane, ear canal and external ear normal. There is no impacted cerumen.      Left Ear: Tympanic membrane, ear canal and external ear normal. There is no impacted cerumen.      Nose: No congestion or rhinorrhea.      Mouth/Throat:      Pharynx: Oropharynx is clear. No oropharyngeal exudate or posterior oropharyngeal erythema.   Eyes:      General:         Right eye: No discharge.         Left eye: No discharge.      Extraocular Movements: Extraocular movements intact.      Conjunctiva/sclera: Conjunctivae normal.   Cardiovascular:      Rate and Rhythm: " "Normal rate and regular rhythm.      Heart sounds: No murmur heard.     No friction rub. No gallop.   Pulmonary:      Effort: Pulmonary effort is normal. No respiratory distress.      Breath sounds: Normal breath sounds.   Musculoskeletal:      Right lower leg: No edema.      Left lower leg: No edema.   Skin:     Capillary Refill: Capillary refill takes less than 2 seconds.      Comments: Five abdominal surgical scars, all healing well, no sign of infection or dehiscence.   Neurological:      Mental Status: He is alert and oriented to person, place, and time.   Psychiatric:         Mood and Affect: Mood normal.         Behavior: Behavior normal.         Thought Content: Thought content normal.         Labs Reviewed:     Chemistry:  Lab Results   Component Value Date     12/12/2024    K 4.5 12/12/2024    BUN 12.0 12/12/2024    CREATININE 0.98 12/12/2024    EGFRNORACEVR >60 12/12/2024    GLUCOSE 88 12/12/2024    CALCIUM 9.0 12/12/2024    ALKPHOS 65 12/12/2024    LABPROT 7.1 12/12/2024    ALBUMIN 3.8 12/12/2024    BILIDIR 0.2 11/06/2024    AST 12 12/12/2024    ALT 12 12/12/2024    MG 1.8 12/06/2024    PHOS 3.0 12/06/2024    HDOMSIJR99PO 49 12/12/2024    TSH 0.811 11/06/2024    CTDYIS7WBQV 1.06 02/08/2024        Lab Results   Component Value Date    HGBA1C 5.4 11/06/2024        Hematology:  Lab Results   Component Value Date    WBC 11.41 12/12/2024    HGB 12.5 (L) 12/12/2024    HCT 37.9 (L) 12/12/2024     (H) 12/12/2024       Lipid Panel:  Lab Results   Component Value Date    CHOL 112 12/12/2024    HDL 27 (L) 12/12/2024    LDL 69.00 12/12/2024    TRIG 78 12/12/2024    TOTALCHOLEST 4 12/12/2024        Urine:  No results found for: "COLORUA", "APPEARANCEUA", "SGUA", "PHUA", "PROTEINUA", "GLUCOSEUA", "KETONESUA", "BLOODUA", "NITRITESUA", "LEUKOCYTESUR", "RBCUA", "WBCUA", "BACTERIA", "SQEPUA", "HYALINECASTS", "CREATRANDUR", "PROTEINURINE", "UPROTCREA"     Assessment:       ICD-10-CM ICD-9-CM   1. History of " bariatric surgery  Z98.84 V45.86        Plan:     1. History of bariatric surgery  -     CBC Auto Differential; Future; Expected date: 12/18/2024  -     Comprehensive Metabolic Panel; Future; Expected date: 12/18/2024  -     Vitamin B12; Future; Expected date: 12/18/2024  -     Vitamin B1, WB; Future; Expected date: 12/18/2024      Physical exam normal today  He is asymptomatic in all respects  Went over all vitamin supplements and he is taking all of them  9 lb weight loss so far   We will repeat the labs since they are on the agenda and I am not sure why they were drawn last week  He has a repeat visit with us in about 2 months  He is supposed to have a visit with the nurse and dietitian at some point in the next 2 weeks and he will follow up on that           No follow-ups on file. In addition to their scheduled follow up, the patient has also been instructed to follow up on as needed basis.     Future Appointments   Date Time Provider Department Center   1/29/2025 10:20 AM LAB, [facility] FAMILY MED [facility] BEBE Osorio   2/5/2025 11:00 AM PROVIDER, [facility] FAMILY MEDICINE [facility] BEBE Osorio   6/5/2025  9:30 AM Alex Calvillo Sr., MD FARHANA Osorio   12/4/2025  9:30 AM Alex Calvillo Sr., MD [facility] BEBE Varma MD

## 2024-12-19 LAB
ALBUMIN SERPL-MCNC: 4.3 G/DL (ref 3.8–4.9)
ALP SERPL-CCNC: 83 IU/L (ref 44–121)
ALT SERPL-CCNC: 12 IU/L (ref 0–44)
AST SERPL-CCNC: 15 IU/L (ref 0–40)
BASOPHILS # BLD AUTO: 0.1 X10E3/UL (ref 0–0.2)
BASOPHILS NFR BLD AUTO: 1 %
BILIRUB SERPL-MCNC: 0.3 MG/DL (ref 0–1.2)
BUN SERPL-MCNC: 15 MG/DL (ref 6–24)
BUN/CREAT SERPL: 15 (ref 9–20)
CALCIUM SERPL-MCNC: 9.6 MG/DL (ref 8.7–10.2)
CHLORIDE SERPL-SCNC: 95 MMOL/L (ref 96–106)
CO2 SERPL-SCNC: 21 MMOL/L (ref 20–29)
CREAT SERPL-MCNC: 0.98 MG/DL (ref 0.76–1.27)
EOSINOPHIL # BLD AUTO: 0.4 X10E3/UL (ref 0–0.4)
EOSINOPHIL NFR BLD AUTO: 4 %
ERYTHROCYTE [DISTWIDTH] IN BLOOD BY AUTOMATED COUNT: 13.4 % (ref 11.6–15.4)
EST. GFR  (NO RACE VARIABLE): 93 ML/MIN/1.73
GLOBULIN SER CALC-MCNC: 3.2 G/DL (ref 1.5–4.5)
GLUCOSE SERPL-MCNC: 92 MG/DL (ref 70–99)
HCT VFR BLD AUTO: 40.4 % (ref 37.5–51)
HGB BLD-MCNC: 13.1 G/DL (ref 13–17.7)
IMM GRANULOCYTES NFR BLD AUTO: 0 %
LYMPHOCYTES # BLD AUTO: 1.4 X10E3/UL (ref 0.7–3.1)
LYMPHOCYTES NFR BLD AUTO: 15 %
MCH RBC QN AUTO: 30.4 PG (ref 26.6–33)
MCHC RBC AUTO-ENTMCNC: 32.4 G/DL (ref 31.5–35.7)
MCV RBC AUTO: 94 FL (ref 79–97)
MONOCYTES # BLD AUTO: 1.2 X10E3/UL (ref 0.1–0.9)
MONOCYTES NFR BLD AUTO: 13 %
NEUTROPHILS # BLD AUTO: 5.8 X10E3/UL (ref 1.4–7)
NEUTROPHILS NFR BLD AUTO: 67 %
PLATELET # BLD AUTO: 432 X10E3/UL (ref 150–450)
POTASSIUM SERPL-SCNC: 4.5 MMOL/L (ref 3.5–5.2)
PROT SERPL-MCNC: 7.5 G/DL (ref 6–8.5)
RBC # BLD AUTO: 4.31 X10E6/UL (ref 4.14–5.8)
SODIUM SERPL-SCNC: 135 MMOL/L (ref 134–144)
VIT B12 SERPL-MCNC: 1130 PG/ML (ref 232–1245)
WBC # BLD AUTO: 8.8 X10E3/UL (ref 3.4–10.8)

## 2024-12-20 ENCOUNTER — PATIENT MESSAGE (OUTPATIENT)
Dept: BARIATRICS | Facility: CLINIC | Age: 54
End: 2024-12-20
Payer: COMMERCIAL

## 2024-12-28 LAB — VIT B1 BLD-SCNC: 143.6 NMOL/L (ref 66.5–200)

## 2025-01-08 ENCOUNTER — DOCUMENTATION ONLY (OUTPATIENT)
Dept: FAMILY MEDICINE | Facility: CLINIC | Age: 55
End: 2025-01-08
Payer: COMMERCIAL

## 2025-01-13 ENCOUNTER — OFFICE VISIT (OUTPATIENT)
Dept: FAMILY MEDICINE | Facility: CLINIC | Age: 55
End: 2025-01-13
Payer: COMMERCIAL

## 2025-01-13 ENCOUNTER — TELEPHONE (OUTPATIENT)
Dept: FAMILY MEDICINE | Facility: CLINIC | Age: 55
End: 2025-01-13

## 2025-01-13 VITALS
TEMPERATURE: 98 F | HEIGHT: 69 IN | RESPIRATION RATE: 20 BRPM | OXYGEN SATURATION: 100 % | BODY MASS INDEX: 46.65 KG/M2 | DIASTOLIC BLOOD PRESSURE: 83 MMHG | WEIGHT: 315 LBS | HEART RATE: 77 BPM | SYSTOLIC BLOOD PRESSURE: 155 MMHG

## 2025-01-13 DIAGNOSIS — Z23 NEED FOR IMMUNIZATION AGAINST INFLUENZA: Primary | ICD-10-CM

## 2025-01-13 DIAGNOSIS — I10 PRIMARY HYPERTENSION: ICD-10-CM

## 2025-01-13 DIAGNOSIS — Z98.84 HISTORY OF BARIATRIC SURGERY: ICD-10-CM

## 2025-01-13 PROCEDURE — 99213 OFFICE O/P EST LOW 20 MIN: CPT | Mod: 25,,, | Performed by: FAMILY MEDICINE

## 2025-01-13 PROCEDURE — 90656 IIV3 VACC NO PRSV 0.5 ML IM: CPT | Mod: ,,, | Performed by: FAMILY MEDICINE

## 2025-01-13 PROCEDURE — 90471 IMMUNIZATION ADMIN: CPT | Mod: ,,, | Performed by: FAMILY MEDICINE

## 2025-01-13 NOTE — PROGRESS NOTES
Family Medicine    Patient ID: [id number]     Chief Complaint: Follow-up (Release to return to work )      HPI:     Hayden Puckett is a 54 y.o. male here today to get cleared from a primary care perspective for return to work.  He reports his surgeon also decreased his from b.i.d. to q.d. secondary to low blood pressure but his pressure is again elevated today.  He brings home blood pressure logs with him in all measurements are normal.    He is still losing significant weight.  Denies hypotensive symptoms, fatigue, abdominal pain, having normal bowel movements.    Past Medical History:   Diagnosis Date    Coronary artery disease     Diabetes mellitus, type 2     Hyperlipidemia     Hypertension     Hypogonadism in male     Hypothyroidism associated with surgical procedure     Insulin resistance     Obesity,     QUEENIE (obstructive sleep apnea)     Sleep apnea     Vitamin D deficiency         Past Surgical History:   Procedure Laterality Date    COLONOSCOPY N/A 11/23/2022    Procedure: COLON;  Surgeon: Drake Farley MD;  Location: [facility] ENDOSCOPY;  Service: Gastroenterology;  Laterality: N/A;    LAPAROSCOPIC GASTROENTEROSTOMY N/A 12/5/2024    Procedure: GASTROENTEROSTOMY, LAPAROSCOPIC W/ INTRA OP EGD;  Surgeon: Darien Bai Jr., MD;  Location: [facility] OR [address] [place] [address];  Service: General;  Laterality: N/A;  [facility]  WAL#  [id number]   BMI 66.33   [employer] REP NOTIFIED anesthesia to complete regional block    LEFT HEART CATHETERIZATION  2022    MANDIBLE FRACTURE SURGERY      THYROID SURGERY      TONSILLECTOMY          Social History     Tobacco Use    Smoking status: Never    Smokeless tobacco: Never   Substance and Sexual Activity    Alcohol use: Not Currently     Comment: A few times in a year    Drug use: Never    Sexual activity: Not on file        Current Outpatient Medications   Medication Instructions    amLODIPine-benazepriL (LOTREL) 5-40 mg per capsule 1 capsule, Oral, 2 times daily    aspirin  (ECOTRIN) 81 mg, Oral, Daily    cholecalciferol, vitamin D3, (VITAMIN D3) 50 mcg (2,000 unit) Cap capsule Daily    clopidogreL (PLAVIX) 75 mg, Daily    gabapentin (NEURONTIN) 300 MG capsule OPEN capsule into a tablespoon and consume with sip of liquid. Take once the MORNING OF SURGERY. After surgery: take one capsule TWICE DAILY for at least 3 days and up to 5 days as needed for pain.    hydroCHLOROthiazide (HYDRODIURIL) 25 mg, Oral, Daily    levothyroxine (SYNTHROID) 125 mcg, Oral, Before breakfast    omeprazole (PRILOSEC) 40 mg, Oral, Every morning, Open capsule and take with apple sauce    ondansetron (ZOFRAN-ODT) 8 mg, Oral, Every 6 hours PRN    pravastatin (PRAVACHOL) 40 mg, Daily    promethazine (PHENERGAN) 12.5 MG Supp Unwrap and insert 1 suppository (12.5 mg total) rectally every 6 (six) hours as needed (nausea, 2nd line).    psyllium (METAMUCIL) powder 1 packet, Daily    ranolazine (RANEXA) 1,000 mg, 2 times daily    ursodioL (ACTIGALL) 500 mg, Oral, Daily, For gallstone prevention.       Review of patient's allergies indicates:  No Known Allergies     Patient Care Team:  Alex Calvillo Sr., MD as PCP - General (Family Medicine)  Eliud Lepe MD as Consulting Physician (Cardiology)  Dave Bustillo OD (Optometry)  Jhony Vaz III, MD as Consulting Physician (Otolaryngology)  To Vázquez NP (Pulmonary Disease)  Amor Eaton MD as Consulting Physician (Orthopedic Surgery)  DR. keisha Kay (Gastroenterology)  Michelle Javier (Cardiology)     Subjective:     Review of Systems   Constitutional:  Negative for activity change, appetite change, fever and unexpected weight change.   HENT:  Negative for congestion, dental problem, hearing loss, rhinorrhea and trouble swallowing.    Eyes:  Negative for discharge and visual disturbance.   Respiratory:  Negative for chest tightness, shortness of breath and wheezing.    Cardiovascular:  Negative for chest pain, palpitations and  "leg swelling.   Gastrointestinal:  Negative for abdominal pain, blood in stool, constipation, diarrhea, nausea and vomiting.   Endocrine: Negative for polydipsia and polyuria.   Genitourinary:  Negative for difficulty urinating, hematuria and urgency.   Musculoskeletal:  Negative for arthralgias, gait problem, joint swelling and neck pain.   Skin:  Negative for rash and wound.   Neurological:  Negative for dizziness, syncope, speech difficulty, weakness, light-headedness and headaches.   Psychiatric/Behavioral:  Negative for confusion, dysphoric mood and suicidal ideas.        12 point review of systems conducted, negative except as stated in the history of present illness. See HPI for details.    Objective:     Visit Vitals  BP (!) 155/83 (BP Location: Left forearm, Patient Position: Sitting)   Pulse 77   Temp 97.6 °F (36.4 °C) (Temporal)   Resp 20   Ht 5' 9" (1.753 m)   Wt (!) 166 kg (366 lb)   SpO2 100%   BMI 54.05 kg/m²       Physical Exam  Vitals and nursing note reviewed.   Constitutional:       General: He is not in acute distress.     Appearance: Normal appearance. He is not ill-appearing, toxic-appearing or diaphoretic.   HENT:      Head: Normocephalic and atraumatic.      Right Ear: Tympanic membrane, ear canal and external ear normal. There is no impacted cerumen.      Left Ear: Tympanic membrane, ear canal and external ear normal. There is no impacted cerumen.      Nose: No congestion or rhinorrhea.      Mouth/Throat:      Pharynx: Oropharynx is clear. No oropharyngeal exudate or posterior oropharyngeal erythema.   Eyes:      General:         Right eye: No discharge.         Left eye: No discharge.      Extraocular Movements: Extraocular movements intact.      Conjunctiva/sclera: Conjunctivae normal.   Cardiovascular:      Rate and Rhythm: Normal rate and regular rhythm.      Heart sounds: No murmur heard.     No friction rub. No gallop.   Pulmonary:      Effort: Pulmonary effort is normal. No " "respiratory distress.      Breath sounds: Normal breath sounds.   Musculoskeletal:      Right lower leg: No edema.      Left lower leg: No edema.   Skin:     Capillary Refill: Capillary refill takes less than 2 seconds.   Neurological:      Mental Status: He is alert and oriented to person, place, and time.   Psychiatric:         Mood and Affect: Mood normal.         Behavior: Behavior normal.         Thought Content: Thought content normal.         Labs Reviewed:     Chemistry:  Lab Results   Component Value Date     12/18/2024    K 4.5 12/18/2024    BUN 15 12/18/2024    CREATININE 0.98 12/18/2024    EGFRNORACEVR 93 12/18/2024    GLUCOSE 88 12/12/2024    CALCIUM 9.6 12/18/2024    ALKPHOS 65 12/12/2024    LABPROT 7.1 12/12/2024    ALBUMIN 4.3 12/18/2024    BILIDIR 0.2 11/06/2024    AST 15 12/18/2024    ALT 12 12/18/2024    MG 1.8 12/06/2024    PHOS 3.0 12/06/2024    [id number] 49 12/12/2024    TSH 0.811 11/06/2024    [id number] 1.06 02/08/2024        Lab Results   Component Value Date    HGBA1C 5.4 11/06/2024        Hematology:  Lab Results   Component Value Date    WBC 8.8 12/18/2024    HGB 13.1 12/18/2024    HCT 40.4 12/18/2024     12/18/2024       Lipid Panel:  Lab Results   Component Value Date    CHOL 112 12/12/2024    HDL 27 (L) 12/12/2024    LDL 69.00 12/12/2024    TRIG 78 12/12/2024    TOTALCHOLEST 4 12/12/2024        Urine:  No results found for: "COLORUA", "APPEARANCEUA", "SGUA", "PHUA", "PROTEINUA", "GLUCOSEUA", "KETONESUA", "BLOODUA", "NITRITESUA", "LEUKOCYTESUR", "RBCUA", "WBCUA", "BACTERIA", "SQEPUA", "HYALINECASTS", "CREATRANDUR", "PROTEINURINE", "UPROTCREA"     Assessment:       ICD-10-CM ICD-9-CM   1. Need for immunization against influenza  Z23 V04.81   2. History of bariatric surgery  Z98.84 V45.86   3. Primary hypertension  I10 401.9        Plan:     1. Need for immunization against influenza  -     influenza (Flulaval, Fluzone, Fluarix) 45 mcg/0.5 mL IM vaccine (> or = 6 mo) 0.5 " mL    2. History of bariatric surgery  Overview:  Hugo-en-Y gastric bypass December 5, 2024  Starting weight 394 lb    Assessment & Plan:  Current weight: 366  Asymptomatic, feeling well, eating well, no GI symptoms, exam normal  Most recent set of labs were normal  Works as a , is physically capable of performing tasks before surgery, feels even better and more functional now   He would like to start back work on Monday   He is cleared from a primary care perspective to return to work at full duty at his previously employed position of a  at [employer]  He will return a couple of weeks for his usual post bariatric surgery labs          3. Primary hypertension  Overview:  Amlodipine-benazepril 5-40 mg b.i.d.   HCTZ 25 mg daily    Both meds prescribed by Cardiology    Assessment & Plan:  He is status post bariatric surgery in December 2024   Anticipate having to significantly lower BP medications secondary to weight loss and cardio metabolic improvements   Surgery stopped his Lotrel from b.i.d. to q.d. because of low blood pressure   Blood pressure elevated today in office, but home measurements are normal   No medication changes today, he will stay on the Lotrel once daily   Returns in 2 weeks and we will recheck it then, with a review of his next set of BP logs  First medication to be removed if necessary will be HCTZ to remove the risk of significant electrolyte disturbances.  If another needs to be removed, we will remove amlodipine since the [facility] has m&m benefits.           No follow-ups on file. In addition to their scheduled follow up, the patient has also been instructed to follow up on as needed basis.     Future Appointments   Date Time Provider Department Center   1/29/2025  8:50 AM LAB, [facility] FAMILY [facility] LUIS ALBERTO Osorio   2/5/2025 11:00 AM PROVIDER, [facility] FAMILY MEDICINE F[facility]HANA Osorio   5/22/2025  7:20 AM [facility]   6/4/2025  9:40 AM  PROVIDER, [facility] MEDICINE FARHANA Osorio   11/24/2025  8:00 AM LAB, [facility] BEBE Osorio   12/4/2025  9:30 AM [facility]

## 2025-01-13 NOTE — ASSESSMENT & PLAN NOTE
Current weight: 366  Asymptomatic, feeling well, eating well, no GI symptoms, exam normal  Most recent set of labs were normal  Works as a , is physically capable of performing tasks before surgery, feels even better and more functional now   He would like to start back work on Monday   He is cleared from a primary care perspective to return to work at full duty at his previously employed position of a  at [employer]  He will return a couple of weeks for his usual post bariatric surgery labs

## 2025-01-13 NOTE — LETTER
Pt Name: Hayden Puckett  YOB: 1970   Employer: Networked reference to record [employer]  Job Title:        Based on the evaluation  on 01/13/2025 ,including history , physical , lab review, discussion of job description and demands, and current physical capabilities, Patient Hayden Puckett is cleared from primary care perspective to return to his employment with [employer] at full duty on 01/20/2025.              [facility]  [address] [place] [phone]

## 2025-01-13 NOTE — LETTER
Pt Name: Hayden Puckett  YOB: 1970   Employer: [employer] reference to record [employer]  Job Title:        Based on the evaluation today, including history, physical, lab review, discussion of job description and demands, and current physical capabilities, patient Hayden Puckett is cleared from a primary care perspective to return to his employment with [employer] at full duty.      ________________________  Jake Varma M.D., J.D  [facility]  [phone]

## 2025-01-13 NOTE — TELEPHONE ENCOUNTER
Hayden called requesting that we send the office note from today to his disability company [employer] .Fax number [phone].

## 2025-01-13 NOTE — LETTER
Pt Name: Hayden Puckett  YOB: 1970   Employer: [employer] reference to record [employer]  Job Title:        Based on the evaluation today, including history, physical, lab review, discussion of job description and demands, and current physical capabilities, patient Hayden Puckett is cleared from a primary care perspective to return to his employment with [employer] as a  at full duty.      Provider Signature/Printed Name:PROVIDER, [facility]         Date: 01/13/2025      [facility]  [address] [place] [phone]

## 2025-01-13 NOTE — LETTER
Pt Name: Hayden Puckett  YOB: 1970   Employer: [employer] reference to record [employer]  Job Title:        Based on the evaluation 01/13/2025, including history, physical, lab review, discussion of job description and demands, and current physical capabilities, patient Hayden Puckett is cleared from a primary care perspective to return to his employment at [employer] at full duty on 1/20/25.         Jake Varma M.D., JGAVI.  [facility] [address] [place] [phone]

## 2025-01-13 NOTE — ASSESSMENT & PLAN NOTE
He is status post bariatric surgery in December 2024   Anticipate having to significantly lower BP medications secondary to weight loss and cardio metabolic improvements   Surgery stopped his Lotrel from b.i.d. to q.d. because of low blood pressure   Blood pressure elevated today in office, but home measurements are normal   No medication changes today, he will stay on the Lotrel once daily   Returns in 2 weeks and we will recheck it then, with a review of his next set of BP logs  First medication to be removed if necessary will be HCTZ to remove the risk of significant electrolyte disturbances.  If another needs to be removed, we will remove amlodipine since the [facility] has m&m benefits.

## 2025-01-28 DIAGNOSIS — Z98.84 HISTORY OF BARIATRIC SURGERY: Primary | ICD-10-CM

## 2025-01-29 NOTE — DISCHARGE SUMMARY
DISCHARGE DATE:  12/21/2021    HOSPITAL COURSE:  The patient was admitted for further evaluation and management of sinus symptoms suggestive of the presence of significant obstructive peripheral arterial disease with Edna class 3 claudication symptomatology.  As outlined in the operative report, he underwent an uneventful peripheral arteriography procedure and found to have widely patent renal arteries and no angiographically significant obstructive peripheral arterial disease throughout the bilateral lower extremities.  He tolerated procedure well and the postop course has been unremarkable allowing for discharge home today.    DISCHARGE DIAGNOSES:    1. Arthritis/leg pain.  2. No angiographically significant obstructive peripheral arterial disease throughout the bilateral lower extremities with widely patent renal arteries.    RECOMMENDATIONS:  The patient will be arranged for discharge home today to continue same medications as on admission.  I have asked him to call or return if he should have any further problems or complaints.  I will be checking on his progress in my office in the very near future.  We will continue to monitor his progress closely and titrate medical therapies as tolerated.  I have asked him to call or return should he have any further problems or complaints.  Further recommendations forthcoming.        ______________________________  Sebastian Coto DO    CT/SF  DD:  12/21/2021  Time:  06:22PM  DT:  12/21/2021  Time:  08:39PM  Job #:  [id number]      78 y/o hx of COPD ( current smoker), Peripheral Neuropathy, HTN present w/ chief complain of syncopal episode for two minutes,hypotension and hyponatremia.  1.Othostatic bp-.  2.Off amlodipine-had edema previously.  3.HTN-ACE.  4.Hyponatremia-management as per renal.D/C Nacl.  5.CT chest w/o contrast as above.Smoking cessation d/w pt.  6.GI and DVT prophylaxis. 76 y/o hx of COPD ( current smoker), Peripheral Neuropathy, HTN present w/ chief complain of syncopal episode At ED, VS notable for hypotension 91/51, / Na of 123 Serum Osm 262, UA negative for Infx, some protenuria.  s/p 1L NS Admitted to medicine for the evaluation and management of Hyponatremia.        78 y/o hx of COPD ( current smoker), Peripheral Neuropathy, HTN present w/ chief complain of syncopal episode for two minutes,hypotension and hyponatremia.  1.Othostatic bp-.  2.Off amlodipine-had edema previously.  3.HTN-ACE.  4.Hyponatremia-management as per renal.  5.CT chest w/o contrast as above.Smoking cessation d/w pt.  6.GI and DVT prophylaxis. 76 y/o hx of COPD ( current smoker), Peripheral Neuropathy, HTN present w/ chief complain of syncopal episode At ED, VS notable for hypotension 91/51, / Na of 123 Serum Osm 262, UA negative for Infx, some protenuria.  s/p 1L NS Admitted to medicine for the evaluation and management of Hyponatremia.        78 y/o hx of COPD ( current smoker), Peripheral Neuropathy, HTN present w/ chief complain of syncopal episode for two minutes, Pt was at her sister house accompanied by , recalls that she began to feel lightheaded when her sister began to ask her if she was feeling ok, she sat down at the chair and does not recall LOC however,  witnesses (  and Sister) reports that patient LOC lasted about two minutes. Nephrology consult called for hyponatremia    Assessment:  1) Hypovolemic/Euvolemic hyponatremia likely dehydration/diuretic use vs ADH response due to fall/pain   2) Syncope r/o Seizure/vasovagal episode  3) Hypertension  4) Chronic COPD  5) peripheral Neuropathy  6) Hyperlipidemia    Recommendation:  Strict I/o  Avoid nephrotoxic agents  Na level 128  Urine studies reviewed with urine sodium 47 and urine osm 449  Free water restriction to <1L/day  Repeat BMP if low Na will start Sodium chloride 1 gm po 8 hrly  Monitor BMP and electrolytes every 8 to 12 hrly  Monitor urine output  Hold diuretic therapy  Optimal pain control  Check serum osmolality, TSH, lipid panel  Chest X-ray Pa/Lateral with no acute findings  Neuro checks  Fall/aspiration/seizure precaution  Will follow 78 y/o hx of COPD ( current smoker), Peripheral Neuropathy, HTN present w/ chief complain of syncopal episode for two minutes, Pt was at her sister house accompanied by , recalls that she began to feel lightheaded when her sister began to ask her if she was feeling ok, she sat down at the chair and does not recall LOC however,  witnesses (  and Sister) reports that patient LOC lasted about two minutes. Nephrology consult called for hyponatremia    Assessment:  1) Hypovolemic/Euvolemic hyponatremia likely dehydration/diuretic use vs ADH response due to fall/pain   2) Syncope r/o Seizure/vasovagal episode  3) Hypertension  4) Chronic COPD  5) peripheral Neuropathy  6) Hyperlipidemia    Recommendation:  Strict I/o  Avoid nephrotoxic agents  Na level 131  Free water restriction to <1L/day  Continue Sodium chloride 1 gm po q 8 hrly  Monitor BMP and electrolytes daily  Continue Hold diuretic therapy  Optimal pain control  Serum osmolality, TSH, lipid panel results reviewed  Chest X-ray Pa/Lateral with no acute findings  Fall/aspiration/seizure precaution  Optimal pain control  Stool softener  Discharge planning per primary team  Out patient renal follow up in 2 weeks  Will follow 76 y/o hx of COPD ( current smoker), Peripheral Neuropathy, HTN present w/ chief complain of syncopal episode for two minutes, Pt was at her sister house accompanied by , recalls that she began to feel lightheaded when her sister began to ask her if she was feeling ok, she sat down at the chair and does not recall LOC however,  witnesses (  and Sister) reports that patient LOC lasted about two minutes. Nephrology consult called for hyponatremia    Assessment:  1) Hypovolemic/Euvolemic hyponatremia likely dehydration/diuretic use vs ADH response due to fall/pain   2) Syncope r/o Seizure/vasovagal episode  3) Hypertension  4) Chronic COPD  5) peripheral Neuropathy  6) Hyperlipidemia    Recommendation:  Strict I/o  Avoid nephrotoxic agents  Na level 128  Free water restriction to <1L/day  Sodium chloride 1 gm po q 8 hrly  Monitor BMP and electrolytes every 12 hrly  Monitor urine output  Hold diuretic therapy  Optimal pain control  Check serum osmolality, TSH, lipid panel  Chest X-ray Pa/Lateral with no acute findings  Neuro checks  Fall/aspiration/seizure precaution  Optimal pain control  Stool softener  Discharge planning per primary team  Will follow 76 y/o hx of COPD ( current smoker), Peripheral Neuropathy, HTN present w/ chief complain of syncopal episode At ED, VS notable for hypotension 91/51, / Na of 123 Serum Osm 262, UA negative for Infx, some protenuria.  s/p 1L NS Admitted to medicine for the evaluation and management of Hyponatremia.

## 2025-01-31 ENCOUNTER — PATIENT MESSAGE (OUTPATIENT)
Dept: BARIATRICS | Facility: CLINIC | Age: 55
End: 2025-01-31
Payer: COMMERCIAL

## 2025-02-03 ENCOUNTER — LAB VISIT (OUTPATIENT)
Dept: LAB | Facility: HOSPITAL | Age: 55
End: 2025-02-03
Attending: FAMILY MEDICINE
Payer: COMMERCIAL

## 2025-02-03 DIAGNOSIS — Z98.84 HISTORY OF BARIATRIC SURGERY: ICD-10-CM

## 2025-02-03 LAB
ALBUMIN SERPL-MCNC: 4.1 G/DL (ref 3.5–5)
ALBUMIN/GLOB SERPL: 1.3 RATIO (ref 1.1–2)
ALP SERPL-CCNC: 84 UNIT/L (ref 40–150)
ALT SERPL-CCNC: 11 UNIT/L (ref 0–55)
ANION GAP SERPL CALC-SCNC: 10 MEQ/L
AST SERPL-CCNC: 13 UNIT/L (ref 5–34)
BASOPHILS # BLD AUTO: 0.07 X10(3)/MCL
BASOPHILS NFR BLD AUTO: 0.8 %
BILIRUB SERPL-MCNC: 0.5 MG/DL
BUN SERPL-MCNC: 13 MG/DL (ref 8.4–25.7)
CALCIUM SERPL-MCNC: 9.3 MG/DL (ref 8.4–10.2)
CHLORIDE SERPL-SCNC: 102 MMOL/L (ref 98–107)
CHOLEST SERPL-MCNC: 105 MG/DL
CHOLEST/HDLC SERPL: 3 {RATIO} (ref 0–5)
CO2 SERPL-SCNC: 25 MMOL/L (ref 22–29)
CREAT SERPL-MCNC: 0.87 MG/DL (ref 0.72–1.25)
CREAT/UREA NIT SERPL: 15
EOSINOPHIL # BLD AUTO: 0.07 X10(3)/MCL (ref 0–0.9)
EOSINOPHIL NFR BLD AUTO: 0.8 %
ERYTHROCYTE [DISTWIDTH] IN BLOOD BY AUTOMATED COUNT: 14 % (ref 11.5–17)
GFR SERPLBLD CREATININE-BSD FMLA CKD-EPI: >60 ML/MIN/1.73/M2
GLOBULIN SER-MCNC: 3.1 GM/DL (ref 2.4–3.5)
GLUCOSE SERPL-MCNC: 95 MG/DL (ref 74–100)
HCT VFR BLD AUTO: 41 % (ref 42–52)
HDLC SERPL-MCNC: 37 MG/DL (ref 35–60)
HGB BLD-MCNC: 13.6 G/DL (ref 14–18)
IMM GRANULOCYTES # BLD AUTO: 0.03 X10(3)/MCL (ref 0–0.04)
IMM GRANULOCYTES NFR BLD AUTO: 0.3 %
IRON SATN MFR SERPL: 12 % (ref 20–50)
IRON SERPL-MCNC: 34 UG/DL (ref 65–175)
LDLC SERPL CALC-MCNC: 56 MG/DL (ref 50–140)
LYMPHOCYTES # BLD AUTO: 0.91 X10(3)/MCL (ref 0.6–4.6)
LYMPHOCYTES NFR BLD AUTO: 10.4 %
MCH RBC QN AUTO: 31 PG (ref 27–31)
MCHC RBC AUTO-ENTMCNC: 33.2 G/DL (ref 33–36)
MCV RBC AUTO: 93.4 FL (ref 80–94)
MONOCYTES # BLD AUTO: 0.89 X10(3)/MCL (ref 0.1–1.3)
MONOCYTES NFR BLD AUTO: 10.1 %
NEUTROPHILS # BLD AUTO: 6.81 X10(3)/MCL (ref 2.1–9.2)
NEUTROPHILS NFR BLD AUTO: 77.6 %
NRBC BLD AUTO-RTO: 0 %
PLATELET # BLD AUTO: 356 X10(3)/MCL (ref 130–400)
PMV BLD AUTO: 9.6 FL (ref 7.4–10.4)
POTASSIUM SERPL-SCNC: 4.1 MMOL/L (ref 3.5–5.1)
PROT SERPL-MCNC: 7.2 GM/DL (ref 6.4–8.3)
RBC # BLD AUTO: 4.39 X10(6)/MCL (ref 4.7–6.1)
SODIUM SERPL-SCNC: 137 MMOL/L (ref 136–145)
TIBC SERPL-MCNC: 255 UG/DL (ref 60–240)
TIBC SERPL-MCNC: 289 UG/DL (ref 250–450)
TRANSFERRIN SERPL-MCNC: 264 MG/DL (ref 174–364)
TRIGL SERPL-MCNC: 59 MG/DL (ref 34–140)
VIT B12 SERPL-MCNC: 1258 PG/ML (ref 213–816)
VLDLC SERPL CALC-MCNC: 12 MG/DL
WBC # BLD AUTO: 8.78 X10(3)/MCL (ref 4.5–11.5)

## 2025-02-03 PROCEDURE — 80061 LIPID PANEL: CPT

## 2025-02-03 PROCEDURE — 36415 COLL VENOUS BLD VENIPUNCTURE: CPT

## 2025-02-03 PROCEDURE — 80053 COMPREHEN METABOLIC PANEL: CPT

## 2025-02-03 PROCEDURE — 84425 ASSAY OF VITAMIN B-1: CPT

## 2025-02-03 PROCEDURE — 82607 VITAMIN B-12: CPT

## 2025-02-03 PROCEDURE — 85025 COMPLETE CBC W/AUTO DIFF WBC: CPT

## 2025-02-03 PROCEDURE — 83550 IRON BINDING TEST: CPT

## 2025-02-06 LAB — VIT B1 BLD-SCNC: 255 NMOL/L (ref 70–180)

## 2025-02-10 ENCOUNTER — OFFICE VISIT (OUTPATIENT)
Dept: FAMILY MEDICINE | Facility: CLINIC | Age: 55
End: 2025-02-10
Payer: COMMERCIAL

## 2025-02-10 VITALS
OXYGEN SATURATION: 99 % | DIASTOLIC BLOOD PRESSURE: 79 MMHG | WEIGHT: 315 LBS | HEIGHT: 69 IN | SYSTOLIC BLOOD PRESSURE: 138 MMHG | RESPIRATION RATE: 18 BRPM | TEMPERATURE: 98 F | HEART RATE: 62 BPM | BODY MASS INDEX: 46.65 KG/M2

## 2025-02-10 DIAGNOSIS — Z98.84 HISTORY OF BARIATRIC SURGERY: Primary | ICD-10-CM

## 2025-02-10 PROCEDURE — 3078F DIAST BP <80 MM HG: CPT | Mod: CPTII,COE,, | Performed by: FAMILY MEDICINE

## 2025-02-10 PROCEDURE — 3075F SYST BP GE 130 - 139MM HG: CPT | Mod: CPTII,COE,, | Performed by: FAMILY MEDICINE

## 2025-02-10 PROCEDURE — 1159F MED LIST DOCD IN RCRD: CPT | Mod: CPTII,COE,, | Performed by: FAMILY MEDICINE

## 2025-02-10 PROCEDURE — 99213 OFFICE O/P EST LOW 20 MIN: CPT | Mod: COE,,, | Performed by: FAMILY MEDICINE

## 2025-02-10 PROCEDURE — 3008F BODY MASS INDEX DOCD: CPT | Mod: CPTII,COE,, | Performed by: FAMILY MEDICINE

## 2025-02-10 NOTE — ASSESSMENT & PLAN NOTE
Current weight:  3046, down 20 lb in 2 months  Asymptomatic, feeling well, eating well, no GI symptoms, exam normal  B vitamins were high but they will be metabolized out in his urine so he will continue those the same dose   However he has low iron (ferritin not drawn for some reason).  Start OTC iron daily without food for one-month.  Return one-month for iron labs and CBC.  If that does not bring it up we will have to send for infusions

## 2025-02-10 NOTE — PROGRESS NOTES
Family Medicine    Patient ID: [id number]     Chief Complaint: Follow-up (1 month post op follow up and lab results )      HPI:     Hayden Puckett is a 55 y.o. male here today for lab results.  He is feeling well, asymptomatic.  He has lost another 20 lb.  He is on a regular diet now.  He notices a difference in his clothes and in his functionality.  He has no complaints.    Past Medical History:   Diagnosis Date    Coronary artery disease     Diabetes mellitus, type 2     Hyperlipidemia     Hypertension     Hypogonadism in male     Hypothyroidism associated with surgical procedure     Insulin resistance     Obesity,     QUEENIE (obstructive sleep apnea)     Sleep apnea     Vitamin D deficiency         Past Surgical History:   Procedure Laterality Date    COLONOSCOPY N/A 11/23/2022    Procedure: COLON;  Surgeon: Drake Farley MD;  Location: [facility] ENDOSCOPY;  Service: Gastroenterology;  Laterality: N/A;    LAPAROSCOPIC GASTROENTEROSTOMY N/A 12/5/2024    Procedure: GASTROENTEROSTOMY, LAPAROSCOPIC W/ INTRA OP EGD;  Surgeon: Darien Bai Jr., MD;  Location: [facility] OR [address] [place] [address];  Service: General;  Laterality: N/A;  VASYL PT  WAL#  [id number]   BMI 66.33   [employer] REP NOTIFIED anesthesia to complete regional block    LEFT HEART CATHETERIZATION  2022    MANDIBLE FRACTURE SURGERY      THYROID SURGERY      TONSILLECTOMY          Social History     Tobacco Use    Smoking status: Never    Smokeless tobacco: Never   Substance and Sexual Activity    Alcohol use: Not Currently     Comment: A few times in a year    Drug use: Never    Sexual activity: Not on file        Current Outpatient Medications   Medication Instructions    amLODIPine-benazepriL (LOTREL) 5-40 mg per capsule 1 capsule, Oral, 2 times daily    aspirin (ECOTRIN) 81 mg, Oral, Daily    cholecalciferol, vitamin D3, (VITAMIN D3) 50 mcg (2,000 unit) Cap capsule Daily    clopidogreL (PLAVIX) 75 mg, Daily    gabapentin (NEURONTIN) 300 MG capsule OPEN capsule  into a tablespoon and consume with sip of liquid. Take once the MORNING OF SURGERY. After surgery: take one capsule TWICE DAILY for at least 3 days and up to 5 days as needed for pain.    hydroCHLOROthiazide (HYDRODIURIL) 25 mg, Oral, Daily    levothyroxine (SYNTHROID) 125 mcg, Oral, Before breakfast    omeprazole (PRILOSEC) 40 mg, Oral, Every morning, Open capsule and take with apple sauce    ondansetron (ZOFRAN-ODT) 8 mg, Oral, Every 6 hours PRN    pravastatin (PRAVACHOL) 40 mg, Daily    promethazine (PHENERGAN) 12.5 MG Supp Unwrap and insert 1 suppository (12.5 mg total) rectally every 6 (six) hours as needed (nausea, 2nd line).    psyllium (METAMUCIL) powder 1 packet, Daily    ranolazine (RANEXA) 1,000 mg, 2 times daily    ursodioL (HARRIS FORTE) 500 mg, Oral, Daily, For gallstone prevention.       Review of patient's allergies indicates:  No Known Allergies     Patient Care Team:  Alex Calvillo Sr., MD as PCP - General (Family Medicine)  Eliud Lepe MD as Consulting Physician (Cardiology)  Dave Bustillo OD (Optometry)  Jhony Vaz III, MD as Consulting Physician (Otolaryngology)  To Vázquez NP (Pulmonary Disease)  Amor Eaton MD as Consulting Physician (Orthopedic Surgery)  DR. keisha Kay (Gastroenterology)  Michelle Javier (Cardiology)     Subjective:     Review of Systems   Constitutional:  Negative for activity change, appetite change, fever and unexpected weight change.   HENT:  Negative for congestion, dental problem, hearing loss, rhinorrhea and trouble swallowing.    Eyes:  Negative for discharge and visual disturbance.   Respiratory:  Negative for chest tightness, shortness of breath and wheezing.    Cardiovascular:  Negative for chest pain, palpitations and leg swelling.   Gastrointestinal:  Negative for abdominal pain, blood in stool, constipation, diarrhea, nausea and vomiting.   Endocrine: Negative for polydipsia and polyuria.   Genitourinary:  Negative  "for difficulty urinating, hematuria and urgency.   Musculoskeletal:  Negative for arthralgias, gait problem, joint swelling and neck pain.   Skin:  Negative for rash and wound.   Neurological:  Negative for dizziness, syncope, speech difficulty, weakness, light-headedness and headaches.   Psychiatric/Behavioral:  Negative for confusion, dysphoric mood and suicidal ideas.        12 point review of systems conducted, negative except as stated in the history of present illness. See HPI for details.    Objective:     Visit Vitals  /79 (BP Location: Left forearm, Patient Position: Sitting)   Pulse 62   Temp 98.2 °F (36.8 °C)   Resp 18   Ht 5' 9" (1.753 m)   Wt (!) 157.3 kg (346 lb 12.8 oz)   SpO2 99%   BMI 51.21 kg/m²       Physical Exam  Vitals and nursing note reviewed.   Constitutional:       General: He is not in acute distress.     Appearance: Normal appearance. He is not ill-appearing, toxic-appearing or diaphoretic.   HENT:      Head: Normocephalic and atraumatic.      Right Ear: Tympanic membrane, ear canal and external ear normal. There is no impacted cerumen.      Left Ear: Tympanic membrane, ear canal and external ear normal. There is no impacted cerumen.      Nose: No congestion or rhinorrhea.      Mouth/Throat:      Pharynx: Oropharynx is clear. No oropharyngeal exudate or posterior oropharyngeal erythema.   Eyes:      General:         Right eye: No discharge.         Left eye: No discharge.      Extraocular Movements: Extraocular movements intact.      Conjunctiva/sclera: Conjunctivae normal.   Cardiovascular:      Rate and Rhythm: Normal rate and regular rhythm.      Heart sounds: No murmur heard.     No friction rub. No gallop.   Pulmonary:      Effort: Pulmonary effort is normal. No respiratory distress.      Breath sounds: Normal breath sounds.   Musculoskeletal:      Right lower leg: No edema.      Left lower leg: No edema.   Skin:     Capillary Refill: Capillary refill takes less than 2 seconds. " "  Neurological:      Mental Status: He is alert and oriented to person, place, and time.   Psychiatric:         Mood and Affect: Mood normal.         Behavior: Behavior normal.         Thought Content: Thought content normal.         Labs Reviewed:     Chemistry:  Lab Results   Component Value Date     02/03/2025    K 4.1 02/03/2025    BUN 13.0 02/03/2025    CREATININE 0.87 02/03/2025    EGFRNORACEVR >60 02/03/2025    GLUCOSE 95 02/03/2025    CALCIUM 9.3 02/03/2025    ALKPHOS 84 02/03/2025    LABPROT 7.2 02/03/2025    ALBUMIN 4.1 02/03/2025    BILIDIR 0.2 11/06/2024    AST 13 02/03/2025    ALT 11 02/03/2025    MG 1.8 12/06/2024    PHOS 3.0 12/06/2024    ZUTGEPJJ58CH 49 12/12/2024    TSH 0.811 11/06/2024    [id number] 1.06 02/08/2024        Lab Results   Component Value Date    HGBA1C 5.4 11/06/2024        Hematology:  Lab Results   Component Value Date    WBC 8.78 02/03/2025    HGB 13.6 (L) 02/03/2025    HCT 41.0 (L) 02/03/2025     02/03/2025       Lipid Panel:  Lab Results   Component Value Date    CHOL 105 02/03/2025    HDL 37 02/03/2025    LDL 56.00 02/03/2025    TRIG 59 02/03/2025    TOTALCHOLEST 3 02/03/2025        Urine:  No results found for: "COLORUA", "APPEARANCEUA", "SGUA", "PHUA", "PROTEINUA", "GLUCOSEUA", "KETONESUA", "BLOODUA", "NITRITESUA", "LEUKOCYTESUR", "RBCUA", "WBCUA", "BACTERIA", "SQEPUA", "HYALINECASTS", "CREATRANDUR", "PROTEINURINE", "UPROTCREA"     Assessment:       ICD-10-CM ICD-9-CM   1. History of bariatric surgery  Z98.84 V45.86        Plan:     1. History of bariatric surgery  Overview:  Hugo-en-Y gastric bypass December 5, 2024  Starting weight 394 lb    Assessment & Plan:  Current weight:  3046, down 20 lb in 2 months  Asymptomatic, feeling well, eating well, no GI symptoms, exam normal  B vitamins were high but they will be metabolized out in his urine so he will continue those the same dose   However he has low iron (ferritin not drawn for some reason).  Start OTC iron " daily without food for one-month.  Return one-month for iron labs and CBC.  If that does not bring it up we will have to send for infusions      Orders:  -     Iron and TIBC; Future; Expected date: 03/10/2025  -     Ferritin; Future; Expected date: 03/10/2025  -     CBC Auto Differential; Future; Expected date: 03/10/2025         No follow-ups on file. In addition to their scheduled follow up, the patient has also been instructed to follow up on as needed basis.     Future Appointments   Date Time Provider Department Center   5[facility]22/2025  [facility]   6/4/2025  9:40 AM PROVIDER, [facility] MEDICINE [facility] BEBE Osorio   11/24/2025  8:00 AM LAB, [facility] BEBE Osorio   12/4/2025  9:30 AM Alex Calvillo Sr., MD [facility] BEBE Varma MD

## 2025-02-14 ENCOUNTER — PATIENT MESSAGE (OUTPATIENT)
Dept: BARIATRICS | Facility: CLINIC | Age: 55
End: 2025-02-14
Payer: COMMERCIAL

## 2025-02-14 DIAGNOSIS — Z71.3 DIETARY COUNSELING AND SURVEILLANCE: Primary | ICD-10-CM

## 2025-02-14 DIAGNOSIS — Z98.84 S/P BARIATRIC SURGERY: ICD-10-CM

## 2025-02-14 DIAGNOSIS — Z09 POSTOP CHECK: ICD-10-CM

## 2025-03-13 ENCOUNTER — RESULTS FOLLOW-UP (OUTPATIENT)
Dept: PRIMARY CARE CLINIC | Facility: CLINIC | Age: 55
End: 2025-03-13
Payer: COMMERCIAL

## 2025-03-31 ENCOUNTER — TELEPHONE (OUTPATIENT)
Dept: FAMILY MEDICINE | Facility: CLINIC | Age: 55
End: 2025-03-31
Payer: COMMERCIAL

## 2025-03-31 NOTE — TELEPHONE ENCOUNTER
Patient  called stating that this morning after getting out of his truck at work , he walked a little ways and then passed out. He was sent to [facility] . He had Blood work, Ct of his head, EKG,  xray and IV fluids, All checked out OK per patient. Was told his B/P was low to stay off and consult his PCP. B/P at hospital was 102/61.  He is taking Amlodipine / Benazepril 5-40 mg  1 daily  , and HCTZ 25 mg  1 daily.    I told him we would call him with further directions after we talked to the doctor.    Please advise.

## 2025-04-02 ENCOUNTER — HOSPITAL ENCOUNTER (INPATIENT)
Facility: HOSPITAL | Age: 55
LOS: 3 days | Discharge: HOME | DRG: 378 | End: 2025-04-06
Attending: STUDENT IN AN ORGANIZED HEALTH CARE EDUCATION/TRAINING PROGRAM | Admitting: INTERNAL MEDICINE
Payer: COMMERCIAL

## 2025-04-02 DIAGNOSIS — R06.02 SOB (SHORTNESS OF BREATH): ICD-10-CM

## 2025-04-02 DIAGNOSIS — D64.9 SYMPTOMATIC ANEMIA: Primary | ICD-10-CM

## 2025-04-02 DIAGNOSIS — K92.2 GASTROINTESTINAL HEMORRHAGE, UNSPECIFIED GASTROINTESTINAL HEMORRHAGE TYPE: ICD-10-CM

## 2025-04-02 LAB
ALBUMIN SERPL-MCNC: 3.1 G/DL (ref 3.5–5)
ALBUMIN/GLOB SERPL: 1.4 RATIO (ref 1.1–2)
ALP SERPL-CCNC: 49 UNIT/L (ref 40–150)
ALT SERPL-CCNC: 7 UNIT/L (ref 0–55)
ANION GAP SERPL CALC-SCNC: 8 MEQ/L
ANISOCYTOSIS BLD QL SMEAR: ABNORMAL
AST SERPL-CCNC: 7 UNIT/L (ref 11–45)
BASOPHILS # BLD AUTO: 0.06 X10(3)/MCL
BASOPHILS NFR BLD AUTO: 0.3 %
BILIRUB SERPL-MCNC: 0.2 MG/DL
BNP BLD-MCNC: <10 PG/ML
BUN SERPL-MCNC: 27.1 MG/DL (ref 8.4–25.7)
BURR CELLS (OLG): SLIGHT
CALCIUM SERPL-MCNC: 8.2 MG/DL (ref 8.4–10.2)
CHLORIDE SERPL-SCNC: 108 MMOL/L (ref 98–107)
CO2 SERPL-SCNC: 21 MMOL/L (ref 22–29)
CREAT SERPL-MCNC: 0.8 MG/DL (ref 0.72–1.25)
CREAT/UREA NIT SERPL: 34
EOSINOPHIL # BLD AUTO: 0.04 X10(3)/MCL (ref 0–0.9)
EOSINOPHIL NFR BLD AUTO: 0.2 %
ERYTHROCYTE [DISTWIDTH] IN BLOOD BY AUTOMATED COUNT: 15 % (ref 11.5–17)
GFR SERPLBLD CREATININE-BSD FMLA CKD-EPI: >60 ML/MIN/1.73/M2
GLOBULIN SER-MCNC: 2.2 GM/DL (ref 2.4–3.5)
GLUCOSE SERPL-MCNC: 128 MG/DL (ref 74–100)
HCT VFR BLD AUTO: 16.6 % (ref 42–52)
HGB BLD-MCNC: 5.4 G/DL (ref 14–18)
IMM GRANULOCYTES # BLD AUTO: 0.61 X10(3)/MCL (ref 0–0.04)
IMM GRANULOCYTES NFR BLD AUTO: 2.7 %
LYMPHOCYTES # BLD AUTO: 3.49 X10(3)/MCL (ref 0.6–4.6)
LYMPHOCYTES NFR BLD AUTO: 15.4 %
MACROCYTES BLD QL SMEAR: ABNORMAL
MCH RBC QN AUTO: 31.4 PG (ref 27–31)
MCHC RBC AUTO-ENTMCNC: 32.5 G/DL (ref 33–36)
MCV RBC AUTO: 96.5 FL (ref 80–94)
MICROCYTES BLD QL SMEAR: ABNORMAL
MONOCYTES # BLD AUTO: 1.68 X10(3)/MCL (ref 0.1–1.3)
MONOCYTES NFR BLD AUTO: 7.4 %
NEUTROPHILS # BLD AUTO: 16.82 X10(3)/MCL (ref 2.1–9.2)
NEUTROPHILS NFR BLD AUTO: 74 %
NRBC BLD AUTO-RTO: 1.9 %
PLATELET # BLD AUTO: 307 X10(3)/MCL (ref 130–400)
PLATELET # BLD EST: NORMAL 10*3/UL
PMV BLD AUTO: 10.2 FL (ref 7.4–10.4)
POIKILOCYTOSIS BLD QL SMEAR: ABNORMAL
POLYCHROMASIA BLD QL SMEAR: ABNORMAL
POTASSIUM SERPL-SCNC: 4.5 MMOL/L (ref 3.5–5.1)
PROT SERPL-MCNC: 5.3 GM/DL (ref 6.4–8.3)
RBC # BLD AUTO: 1.72 X10(6)/MCL (ref 4.7–6.1)
RBC MORPH BLD: ABNORMAL
SODIUM SERPL-SCNC: 137 MMOL/L (ref 136–145)
TROPONIN I SERPL-MCNC: <0.01 NG/ML (ref 0–0.04)
WBC # BLD AUTO: 22.7 X10(3)/MCL (ref 4.5–11.5)

## 2025-04-02 PROCEDURE — 63600175 PHARM REV CODE 636 W HCPCS: Performed by: STUDENT IN AN ORGANIZED HEALTH CARE EDUCATION/TRAINING PROGRAM

## 2025-04-02 PROCEDURE — 84484 ASSAY OF TROPONIN QUANT: CPT | Performed by: STUDENT IN AN ORGANIZED HEALTH CARE EDUCATION/TRAINING PROGRAM

## 2025-04-02 PROCEDURE — 99285 EMERGENCY DEPT VISIT HI MDM: CPT | Mod: 25

## 2025-04-02 PROCEDURE — 96374 THER/PROPH/DIAG INJ IV PUSH: CPT

## 2025-04-02 PROCEDURE — 83880 ASSAY OF NATRIURETIC PEPTIDE: CPT | Performed by: STUDENT IN AN ORGANIZED HEALTH CARE EDUCATION/TRAINING PROGRAM

## 2025-04-02 PROCEDURE — 36430 TRANSFUSION BLD/BLD COMPNT: CPT

## 2025-04-02 PROCEDURE — 80053 COMPREHEN METABOLIC PANEL: CPT | Performed by: STUDENT IN AN ORGANIZED HEALTH CARE EDUCATION/TRAINING PROGRAM

## 2025-04-02 PROCEDURE — 93005 ELECTROCARDIOGRAM TRACING: CPT

## 2025-04-02 PROCEDURE — 85730 THROMBOPLASTIN TIME PARTIAL: CPT | Performed by: STUDENT IN AN ORGANIZED HEALTH CARE EDUCATION/TRAINING PROGRAM

## 2025-04-02 PROCEDURE — 93010 ELECTROCARDIOGRAM REPORT: CPT | Mod: COE,,, | Performed by: INTERNAL MEDICINE

## 2025-04-02 PROCEDURE — 85025 COMPLETE CBC W/AUTO DIFF WBC: CPT | Performed by: STUDENT IN AN ORGANIZED HEALTH CARE EDUCATION/TRAINING PROGRAM

## 2025-04-02 PROCEDURE — 85610 PROTHROMBIN TIME: CPT | Performed by: STUDENT IN AN ORGANIZED HEALTH CARE EDUCATION/TRAINING PROGRAM

## 2025-04-02 RX ORDER — HYDROCODONE BITARTRATE AND ACETAMINOPHEN 500; 5 MG/1; MG/1
TABLET ORAL
Status: DISCONTINUED | OUTPATIENT
Start: 2025-04-03 | End: 2025-04-06 | Stop reason: HOSPADM

## 2025-04-02 RX ORDER — PANTOPRAZOLE SODIUM 40 MG/10ML
80 INJECTION, POWDER, LYOPHILIZED, FOR SOLUTION INTRAVENOUS
Status: COMPLETED | OUTPATIENT
Start: 2025-04-02 | End: 2025-04-02

## 2025-04-02 RX ADMIN — PANTOPRAZOLE SODIUM 80 MG: 40 INJECTION, POWDER, FOR SOLUTION INTRAVENOUS at 11:04

## 2025-04-02 NOTE — LETTER
April 6, 2025         [address] [facility] [id number]       Patient: Hayden Puckett   YOB: 1970  Date of Visit: 04/02/2025    To Whom It May Concern:    Jessica Puckett  was at [facility] on 04/02/2025-04/06/2025. The patient may return to work/school on 04/09/2025 with no restrictions. If you have any questions or concerns, please do not hesitate to contact his physician.    Sincerely,    Jesus Wright LPN

## 2025-04-02 NOTE — TELEPHONE ENCOUNTER
Patient notified of Dr Hansen orders, Follow up appointment on 4/3/25 at 8:40 am.Patient verbalized an understanding.

## 2025-04-03 ENCOUNTER — ANESTHESIA (OUTPATIENT)
Dept: ENDOSCOPY | Facility: HOSPITAL | Age: 55
End: 2025-04-03
Payer: COMMERCIAL

## 2025-04-03 ENCOUNTER — ANESTHESIA EVENT (OUTPATIENT)
Dept: ENDOSCOPY | Facility: HOSPITAL | Age: 55
End: 2025-04-03
Payer: COMMERCIAL

## 2025-04-03 PROBLEM — D62 ACUTE BLOOD LOSS ANEMIA: Status: ACTIVE | Noted: 2025-04-03

## 2025-04-03 LAB
ABO + RH BLD: NORMAL
ABS NEUT (OLG): 19.96 X10(3)/MCL (ref 2.1–9.2)
ALBUMIN SERPL-MCNC: 3.1 G/DL (ref 3.5–5)
ALBUMIN/GLOB SERPL: 1.4 RATIO (ref 1.1–2)
ALP SERPL-CCNC: 48 UNIT/L (ref 40–150)
ALT SERPL-CCNC: 8 UNIT/L (ref 0–55)
ANION GAP SERPL CALC-SCNC: 7 MEQ/L
APTT PPP: 22.7 SECONDS (ref 23.2–33.7)
AST SERPL-CCNC: 9 UNIT/L (ref 11–45)
BILIRUB SERPL-MCNC: 0.3 MG/DL
BLD PROD TYP BPU: NORMAL
BLOOD UNIT EXPIRATION DATE: NORMAL
BLOOD UNIT TYPE CODE: 600
BUN SERPL-MCNC: 23.1 MG/DL (ref 8.4–25.7)
CALCIUM SERPL-MCNC: 8.1 MG/DL (ref 8.4–10.2)
CHLORIDE SERPL-SCNC: 106 MMOL/L (ref 98–107)
CO2 SERPL-SCNC: 23 MMOL/L (ref 22–29)
CREAT SERPL-MCNC: 0.7 MG/DL (ref 0.72–1.25)
CREAT/UREA NIT SERPL: 33
CROSSMATCH INTERPRETATION: NORMAL
DISPENSE STATUS: NORMAL
ERYTHROCYTE [DISTWIDTH] IN BLOOD BY AUTOMATED COUNT: 15.4 % (ref 11.5–17)
GFR SERPLBLD CREATININE-BSD FMLA CKD-EPI: >60 ML/MIN/1.73/M2
GLOBULIN SER-MCNC: 2.2 GM/DL (ref 2.4–3.5)
GLUCOSE SERPL-MCNC: 118 MG/DL (ref 74–100)
GROUP & RH: NORMAL
HCT VFR BLD AUTO: 18.4 % (ref 42–52)
HCT VFR BLD AUTO: 21.7 % (ref 42–52)
HGB BLD-MCNC: 5.9 G/DL (ref 14–18)
HGB BLD-MCNC: 7.3 G/DL (ref 14–18)
INDIRECT COOMBS: NORMAL
INR PPP: 1.1
INSTRUMENT WBC (OLG): 24.34 X10(3)/MCL
LACTATE SERPL-SCNC: 1.9 MMOL/L (ref 0.5–2.2)
LYMPHOCYTES NFR BLD MANUAL: 11 %
LYMPHOCYTES NFR BLD MANUAL: 2.68 X10(3)/MCL (ref 0.6–4.6)
MCH RBC QN AUTO: 31.1 PG (ref 27–31)
MCHC RBC AUTO-ENTMCNC: 32.1 G/DL (ref 33–36)
MCV RBC AUTO: 96.8 FL (ref 80–94)
MONOCYTES NFR BLD MANUAL: 1.22 X10(3)/MCL (ref 0.1–1.3)
MONOCYTES NFR BLD MANUAL: 5 %
MYELOCYTES NFR BLD MANUAL: 1 %
NEUTROPHILS NFR BLD MANUAL: 82 %
OHS QRS DURATION: 84 MS
OHS QTC CALCULATION: 445 MS
PLATELET # BLD AUTO: 283 X10(3)/MCL (ref 130–400)
PLATELET # BLD EST: NORMAL 10*3/UL
PMV BLD AUTO: 10.1 FL (ref 7.4–10.4)
POCT GLUCOSE: 102 MG/DL (ref 70–110)
POIKILOCYTOSIS BLD QL SMEAR: ABNORMAL
POLYCHROMASIA BLD QL SMEAR: ABNORMAL
POTASSIUM SERPL-SCNC: 3.9 MMOL/L (ref 3.5–5.1)
PROMYELOCYTES # BLD MANUAL: 1 %
PROT SERPL-MCNC: 5.3 GM/DL (ref 6.4–8.3)
PROTHROMBIN TIME: 14.7 SECONDS (ref 12.5–14.5)
RBC # BLD AUTO: 1.9 X10(6)/MCL (ref 4.7–6.1)
RBC MORPH BLD: ABNORMAL
SODIUM SERPL-SCNC: 136 MMOL/L (ref 136–145)
SPECIMEN OUTDATE: NORMAL
STOMATOCYTES (OLG): ABNORMAL
UNIT NUMBER: NORMAL
WBC # BLD AUTO: 24.34 X10(3)/MCL (ref 4.5–11.5)

## 2025-04-03 PROCEDURE — 21400001 HC TELEMETRY ROOM

## 2025-04-03 PROCEDURE — 25000003 PHARM REV CODE 250: Performed by: STUDENT IN AN ORGANIZED HEALTH CARE EDUCATION/TRAINING PROGRAM

## 2025-04-03 PROCEDURE — 86923 COMPATIBILITY TEST ELECTRIC: CPT | Mod: 91 | Performed by: STUDENT IN AN ORGANIZED HEALTH CARE EDUCATION/TRAINING PROGRAM

## 2025-04-03 PROCEDURE — 63600175 PHARM REV CODE 636 W HCPCS: Performed by: INTERNAL MEDICINE

## 2025-04-03 PROCEDURE — 85018 HEMOGLOBIN: CPT | Performed by: INTERNAL MEDICINE

## 2025-04-03 PROCEDURE — P9016 RBC LEUKOCYTES REDUCED: HCPCS | Performed by: NURSE PRACTITIONER

## 2025-04-03 PROCEDURE — 25000003 PHARM REV CODE 250: Performed by: INTERNAL MEDICINE

## 2025-04-03 PROCEDURE — 80053 COMPREHEN METABOLIC PANEL: CPT | Performed by: STUDENT IN AN ORGANIZED HEALTH CARE EDUCATION/TRAINING PROGRAM

## 2025-04-03 PROCEDURE — 63600175 PHARM REV CODE 636 W HCPCS: Performed by: STUDENT IN AN ORGANIZED HEALTH CARE EDUCATION/TRAINING PROGRAM

## 2025-04-03 PROCEDURE — 30233N1 TRANSFUSION OF NONAUTOLOGOUS RED BLOOD CELLS INTO PERIPHERAL VEIN, PERCUTANEOUS APPROACH: ICD-10-PCS | Performed by: INTERNAL MEDICINE

## 2025-04-03 PROCEDURE — P9016 RBC LEUKOCYTES REDUCED: HCPCS | Performed by: STUDENT IN AN ORGANIZED HEALTH CARE EDUCATION/TRAINING PROGRAM

## 2025-04-03 PROCEDURE — 96372 THER/PROPH/DIAG INJ SC/IM: CPT | Performed by: STUDENT IN AN ORGANIZED HEALTH CARE EDUCATION/TRAINING PROGRAM

## 2025-04-03 PROCEDURE — 37000008 HC ANESTHESIA 1ST 15 MINUTES: Performed by: STUDENT IN AN ORGANIZED HEALTH CARE EDUCATION/TRAINING PROGRAM

## 2025-04-03 PROCEDURE — 96360 HYDRATION IV INFUSION INIT: CPT

## 2025-04-03 PROCEDURE — 36415 COLL VENOUS BLD VENIPUNCTURE: CPT | Performed by: INTERNAL MEDICINE

## 2025-04-03 PROCEDURE — P9016 RBC LEUKOCYTES REDUCED: HCPCS | Performed by: INTERNAL MEDICINE

## 2025-04-03 PROCEDURE — 63600175 PHARM REV CODE 636 W HCPCS

## 2025-04-03 PROCEDURE — 27201423 OPTIME MED/SURG SUP & DEVICES STERILE SUPPLY: Performed by: STUDENT IN AN ORGANIZED HEALTH CARE EDUCATION/TRAINING PROGRAM

## 2025-04-03 PROCEDURE — 25500020 PHARM REV CODE 255: Performed by: STUDENT IN AN ORGANIZED HEALTH CARE EDUCATION/TRAINING PROGRAM

## 2025-04-03 PROCEDURE — 86900 BLOOD TYPING SEROLOGIC ABO: CPT | Performed by: STUDENT IN AN ORGANIZED HEALTH CARE EDUCATION/TRAINING PROGRAM

## 2025-04-03 PROCEDURE — 43255 EGD CONTROL BLEEDING ANY: CPT | Performed by: STUDENT IN AN ORGANIZED HEALTH CARE EDUCATION/TRAINING PROGRAM

## 2025-04-03 PROCEDURE — 37000009 HC ANESTHESIA EA ADD 15 MINS: Performed by: STUDENT IN AN ORGANIZED HEALTH CARE EDUCATION/TRAINING PROGRAM

## 2025-04-03 PROCEDURE — 87040 BLOOD CULTURE FOR BACTERIA: CPT | Performed by: STUDENT IN AN ORGANIZED HEALTH CARE EDUCATION/TRAINING PROGRAM

## 2025-04-03 PROCEDURE — 86923 COMPATIBILITY TEST ELECTRIC: CPT | Mod: 91 | Performed by: NURSE PRACTITIONER

## 2025-04-03 PROCEDURE — 25000003 PHARM REV CODE 250

## 2025-04-03 PROCEDURE — 36430 TRANSFUSION BLD/BLD COMPNT: CPT

## 2025-04-03 PROCEDURE — 83605 ASSAY OF LACTIC ACID: CPT | Performed by: STUDENT IN AN ORGANIZED HEALTH CARE EDUCATION/TRAINING PROGRAM

## 2025-04-03 PROCEDURE — 86923 COMPATIBILITY TEST ELECTRIC: CPT | Mod: 91 | Performed by: INTERNAL MEDICINE

## 2025-04-03 PROCEDURE — 0W3P8ZZ CONTROL BLEEDING IN GASTROINTESTINAL TRACT, VIA NATURAL OR ARTIFICIAL OPENING ENDOSCOPIC: ICD-10-PCS | Performed by: STUDENT IN AN ORGANIZED HEALTH CARE EDUCATION/TRAINING PROGRAM

## 2025-04-03 PROCEDURE — 85025 COMPLETE CBC W/AUTO DIFF WBC: CPT | Performed by: STUDENT IN AN ORGANIZED HEALTH CARE EDUCATION/TRAINING PROGRAM

## 2025-04-03 PROCEDURE — 25000003 PHARM REV CODE 250: Performed by: PHYSICIAN ASSISTANT

## 2025-04-03 RX ORDER — ERYTHROMYCIN ETHYLSUCCINATE 200 MG/5ML
250 SUSPENSION ORAL ONCE
Status: DISCONTINUED | OUTPATIENT
Start: 2025-04-03 | End: 2025-04-04

## 2025-04-03 RX ORDER — PHENYLEPHRINE HCL IN 0.9% NACL 1 MG/10 ML
SYRINGE (ML) INTRAVENOUS
Status: DISCONTINUED | OUTPATIENT
Start: 2025-04-03 | End: 2025-04-03

## 2025-04-03 RX ORDER — LIDOCAINE HYDROCHLORIDE 20 MG/ML
INJECTION INTRAVENOUS
Status: DISCONTINUED | OUTPATIENT
Start: 2025-04-03 | End: 2025-04-03

## 2025-04-03 RX ORDER — ACETAMINOPHEN 325 MG/1
650 TABLET ORAL EVERY 8 HOURS PRN
Status: DISCONTINUED | OUTPATIENT
Start: 2025-04-03 | End: 2025-04-06 | Stop reason: HOSPADM

## 2025-04-03 RX ORDER — ONDANSETRON HYDROCHLORIDE 2 MG/ML
4 INJECTION, SOLUTION INTRAVENOUS
Status: COMPLETED | OUTPATIENT
Start: 2025-04-03 | End: 2025-04-03

## 2025-04-03 RX ORDER — MORPHINE SULFATE 4 MG/ML
2 INJECTION, SOLUTION INTRAMUSCULAR; INTRAVENOUS EVERY 4 HOURS PRN
Refills: 0 | Status: DISCONTINUED | OUTPATIENT
Start: 2025-04-03 | End: 2025-04-06 | Stop reason: HOSPADM

## 2025-04-03 RX ORDER — TALC
6 POWDER (GRAM) TOPICAL NIGHTLY PRN
Status: DISCONTINUED | OUTPATIENT
Start: 2025-04-03 | End: 2025-04-06 | Stop reason: HOSPADM

## 2025-04-03 RX ORDER — IPRATROPIUM BROMIDE AND ALBUTEROL SULFATE 2.5; .5 MG/3ML; MG/3ML
3 SOLUTION RESPIRATORY (INHALATION)
Status: CANCELLED | OUTPATIENT
Start: 2025-04-03

## 2025-04-03 RX ORDER — LORAZEPAM 2 MG/ML
0.25 INJECTION INTRAMUSCULAR ONCE AS NEEDED
Status: CANCELLED | OUTPATIENT
Start: 2025-04-03 | End: 2036-08-29

## 2025-04-03 RX ORDER — SODIUM CHLORIDE 0.9 % (FLUSH) 0.9 %
10 SYRINGE (ML) INJECTION
Status: DISCONTINUED | OUTPATIENT
Start: 2025-04-03 | End: 2025-04-06 | Stop reason: HOSPADM

## 2025-04-03 RX ORDER — LEVOTHYROXINE SODIUM 125 UG/1
125 TABLET ORAL
Status: DISCONTINUED | OUTPATIENT
Start: 2025-04-04 | End: 2025-04-06 | Stop reason: HOSPADM

## 2025-04-03 RX ORDER — ONDANSETRON HYDROCHLORIDE 2 MG/ML
4 INJECTION, SOLUTION INTRAVENOUS EVERY 8 HOURS PRN
Status: DISCONTINUED | OUTPATIENT
Start: 2025-04-03 | End: 2025-04-06 | Stop reason: HOSPADM

## 2025-04-03 RX ORDER — SUCRALFATE 1 G/1
1 TABLET ORAL
Status: DISCONTINUED | OUTPATIENT
Start: 2025-04-03 | End: 2025-04-06 | Stop reason: HOSPADM

## 2025-04-03 RX ORDER — ONDANSETRON HYDROCHLORIDE 2 MG/ML
4 INJECTION, SOLUTION INTRAVENOUS DAILY PRN
Status: CANCELLED | OUTPATIENT
Start: 2025-04-03

## 2025-04-03 RX ORDER — RANOLAZINE 500 MG/1
1000 TABLET, EXTENDED RELEASE ORAL 2 TIMES DAILY
Status: DISCONTINUED | OUTPATIENT
Start: 2025-04-03 | End: 2025-04-06 | Stop reason: HOSPADM

## 2025-04-03 RX ORDER — MORPHINE SULFATE 4 MG/ML
4 INJECTION, SOLUTION INTRAMUSCULAR; INTRAVENOUS EVERY 4 HOURS PRN
Refills: 0 | Status: DISCONTINUED | OUTPATIENT
Start: 2025-04-03 | End: 2025-04-06 | Stop reason: HOSPADM

## 2025-04-03 RX ORDER — ETOMIDATE 2 MG/ML
INJECTION INTRAVENOUS
Status: DISCONTINUED | OUTPATIENT
Start: 2025-04-03 | End: 2025-04-03

## 2025-04-03 RX ORDER — HYDROCODONE BITARTRATE AND ACETAMINOPHEN 500; 5 MG/1; MG/1
TABLET ORAL
Status: DISCONTINUED | OUTPATIENT
Start: 2025-04-03 | End: 2025-04-06 | Stop reason: HOSPADM

## 2025-04-03 RX ORDER — PROCHLORPERAZINE EDISYLATE 5 MG/ML
5 INJECTION INTRAMUSCULAR; INTRAVENOUS EVERY 30 MIN PRN
Status: CANCELLED | OUTPATIENT
Start: 2025-04-03

## 2025-04-03 RX ORDER — PHENYLEPHRINE HCL IN 0.9% NACL 1 MG/10 ML
SYRINGE (ML) INTRAVENOUS
Status: COMPLETED
Start: 2025-04-03 | End: 2025-04-03

## 2025-04-03 RX ORDER — MULTIVITAMIN
1 TABLET ORAL DAILY
COMMUNITY

## 2025-04-03 RX ORDER — FAMOTIDINE 20 MG/1
20 TABLET, FILM COATED ORAL 2 TIMES DAILY
Status: DISCONTINUED | OUTPATIENT
Start: 2025-04-03 | End: 2025-04-03 | Stop reason: SDUPTHER

## 2025-04-03 RX ORDER — LIDOCAINE HYDROCHLORIDE 10 MG/ML
1 INJECTION, SOLUTION EPIDURAL; INFILTRATION; INTRACAUDAL; PERINEURAL ONCE
Status: CANCELLED | OUTPATIENT
Start: 2025-04-03 | End: 2025-04-03

## 2025-04-03 RX ORDER — FUROSEMIDE 10 MG/ML
20 INJECTION INTRAMUSCULAR; INTRAVENOUS ONCE
Status: COMPLETED | OUTPATIENT
Start: 2025-04-03 | End: 2025-04-03

## 2025-04-03 RX ORDER — GLUCAGON 1 MG
1 KIT INJECTION
Status: CANCELLED | OUTPATIENT
Start: 2025-04-03

## 2025-04-03 RX ORDER — IBUPROFEN 200 MG
1 CAPSULE ORAL DAILY
COMMUNITY

## 2025-04-03 RX ORDER — LIDOCAINE HYDROCHLORIDE 20 MG/ML
INJECTION, SOLUTION EPIDURAL; INFILTRATION; INTRACAUDAL; PERINEURAL
Status: DISPENSED
Start: 2025-04-03 | End: 2025-04-03

## 2025-04-03 RX ORDER — PRAVASTATIN SODIUM 40 MG/1
40 TABLET ORAL DAILY
Status: DISCONTINUED | OUTPATIENT
Start: 2025-04-04 | End: 2025-04-06 | Stop reason: HOSPADM

## 2025-04-03 RX ORDER — SYRING-NEEDL,DISP,INSUL,0.3 ML 29 G X1/2"
296 SYRINGE, EMPTY DISPOSABLE MISCELLANEOUS
Status: COMPLETED | OUTPATIENT
Start: 2025-04-03 | End: 2025-04-03

## 2025-04-03 RX ORDER — LANOLIN ALCOHOL/MO/W.PET/CERES
1 CREAM (GRAM) TOPICAL
COMMUNITY

## 2025-04-03 RX ORDER — ONDANSETRON 4 MG/1
8 TABLET, ORALLY DISINTEGRATING ORAL EVERY 6 HOURS PRN
Status: CANCELLED | OUTPATIENT
Start: 2025-04-03

## 2025-04-03 RX ORDER — SODIUM CHLORIDE, SODIUM GLUCONATE, SODIUM ACETATE, POTASSIUM CHLORIDE AND MAGNESIUM CHLORIDE 30; 37; 368; 526; 502 MG/100ML; MG/100ML; MG/100ML; MG/100ML; MG/100ML
INJECTION, SOLUTION INTRAVENOUS CONTINUOUS
Status: CANCELLED | OUTPATIENT
Start: 2025-04-03 | End: 2025-05-03

## 2025-04-03 RX ORDER — CYANOCOBALAMIN (VITAMIN B-12) 250 MCG
250 TABLET ORAL DAILY
COMMUNITY

## 2025-04-03 RX ORDER — ETOMIDATE 2 MG/ML
INJECTION INTRAVENOUS
Status: COMPLETED
Start: 2025-04-03 | End: 2025-04-03

## 2025-04-03 RX ORDER — PROPOFOL 10 MG/ML
VIAL (ML) INTRAVENOUS
Status: DISCONTINUED | OUTPATIENT
Start: 2025-04-03 | End: 2025-04-03

## 2025-04-03 RX ORDER — PROPOFOL 10 MG/ML
VIAL (ML) INTRAVENOUS
Status: COMPLETED
Start: 2025-04-03 | End: 2025-04-03

## 2025-04-03 RX ADMIN — ONDANSETRON 4 MG: 2 INJECTION INTRAMUSCULAR; INTRAVENOUS at 01:04

## 2025-04-03 RX ADMIN — Medication 100 MCG: at 09:04

## 2025-04-03 RX ADMIN — SODIUM CHLORIDE, POTASSIUM CHLORIDE, SODIUM LACTATE AND CALCIUM CHLORIDE 1000 ML: 600; 310; 30; 20 INJECTION, SOLUTION INTRAVENOUS at 12:04

## 2025-04-03 RX ADMIN — LIDOCAINE HYDROCHLORIDE 150 MG: 20 INJECTION INTRAVENOUS at 09:04

## 2025-04-03 RX ADMIN — PROPOFOL 50 MG: 10 INJECTION, EMULSION INTRAVENOUS at 09:04

## 2025-04-03 RX ADMIN — SUCRALFATE 1 G: 1 TABLET ORAL at 07:04

## 2025-04-03 RX ADMIN — SODIUM CHLORIDE: 9 INJECTION, SOLUTION INTRAVENOUS at 09:04

## 2025-04-03 RX ADMIN — PROPOFOL 30 MG: 10 INJECTION, EMULSION INTRAVENOUS at 09:04

## 2025-04-03 RX ADMIN — PANTOPRAZOLE SODIUM 8 MG/HR: 40 INJECTION, POWDER, FOR SOLUTION INTRAVENOUS at 07:04

## 2025-04-03 RX ADMIN — MAGNESIUM CITRATE 296 ML: 1.75 LIQUID ORAL at 03:04

## 2025-04-03 RX ADMIN — ETOMIDATE 5 MG: 2 INJECTION INTRAVENOUS at 09:04

## 2025-04-03 RX ADMIN — RANOLAZINE 1000 MG: 500 TABLET, EXTENDED RELEASE ORAL at 07:04

## 2025-04-03 RX ADMIN — PIPERACILLIN AND TAZOBACTAM 4.5 G: 4; .5 INJECTION, POWDER, LYOPHILIZED, FOR SOLUTION INTRAVENOUS; PARENTERAL at 05:04

## 2025-04-03 RX ADMIN — FUROSEMIDE 20 MG: 10 INJECTION, SOLUTION INTRAMUSCULAR; INTRAVENOUS at 09:04

## 2025-04-03 RX ADMIN — SUCRALFATE 1 G: 1 TABLET ORAL at 05:04

## 2025-04-03 RX ADMIN — PANTOPRAZOLE SODIUM 8 MG/HR: 40 INJECTION, POWDER, FOR SOLUTION INTRAVENOUS at 12:04

## 2025-04-03 RX ADMIN — ETOMIDATE 10 MG: 2 INJECTION INTRAVENOUS at 09:04

## 2025-04-03 RX ADMIN — IOHEXOL 100 ML: 350 INJECTION, SOLUTION INTRAVENOUS at 01:04

## 2025-04-03 RX ADMIN — PROPOFOL 70 MG: 10 INJECTION, EMULSION INTRAVENOUS at 09:04

## 2025-04-03 NOTE — OR NURSING
Report given to KEITH Elise in ED. Informed or full liquid diet, advance as tolerated. Stated she understood.

## 2025-04-03 NOTE — ED PROVIDER NOTES
Encounter Date: 4/2/2025    SCRIBE #1 NOTE: I, Jacinto Archer, am scribing for, and in the presence of,  Salo Du MD. I have scribed the following portions of the note - Other sections scribed: HPI,ROS,PE.       History     Chief Complaint   Patient presents with    Loss of Consciousness     Patient here with c/o passing out, shortness of breath on exertion, low blood pressure, headache, lightheadedness, and dizziness since Monday. Had a syncopal episode on Monday was seen [facility]. Was taking Amlodipine/Benazepril 5-40mg and HCTZ 25mg hasn't taken medication since Monday. Had a gastric sleeve 12/05/2024.      56 y/o male with PMHx of CAD, DM, HLD, HTN, and gastric bypass surgery presents to ED c/o syncope onset Monday. Pt reports he was leaving work, when he started to feel lightheaded, and had 2x back-to- back syncopal episodes. He reports EMS on scene told him his blood pressure was low and went to [facility]. He reports while at [facility], he was taken off his BP meds. He reports since onset, he has been having lightheadedness when standing up and exerting himself. He also complains of melanic stool, nausea, and mild SOB since onset. He denies any abdominal pain, shortness of breath, chest pain, or vomiting.     The history is provided by the patient and medical records.     Review of patient's allergies indicates:  No Known Allergies  Past Medical History:   Diagnosis Date    Coronary artery disease     Diabetes mellitus, type 2     Hyperlipidemia     Hypertension     Hypogonadism in male     Hypothyroidism associated with surgical procedure     Insulin resistance     Obesity,     QUEENIE (obstructive sleep apnea)     Sleep apnea     Vitamin D deficiency      Past Surgical History:   Procedure Laterality Date    COLONOSCOPY N/A 11/23/2022    Procedure: COLON;  Surgeon: Drake Farley MD;  Location: [facility] ENDOSCOPY;  Service: Gastroenterology;  Laterality: N/A;    LAPAROSCOPIC  GASTROENTEROSTOMY N/A 12/5/2024    Procedure: GASTROENTEROSTOMY, LAPAROSCOPIC W/ INTRA OP EGD;  Surgeon: Darien Bai Jr., MD;  Location: [facility] OR [address] [place] [address];  Service: General;  Laterality: N/A;  VASYL PT  WAL#  [id number]   BMI 66.33   [employer] REP NOTIFIED anesthesia to complete regional block    LEFT HEART CATHETERIZATION  2022    MANDIBLE FRACTURE SURGERY      THYROID SURGERY      TONSILLECTOMY       Family History   Problem Relation Name Age of Onset    Hypertension Mother      Hypertension Father      Breast cancer Maternal Grandmother      Lung cancer Maternal Grandfather      Diabetes Mellitus Paternal Grandmother       Social History[1]  Review of Systems   Constitutional:  Negative for chills and fever.   HENT:  Negative for drooling and sore throat.    Eyes:  Negative for pain and redness.   Respiratory:  Positive for shortness of breath. Negative for wheezing and stridor.    Cardiovascular:  Negative for chest pain, palpitations and leg swelling.   Gastrointestinal:  Positive for blood in stool (melanic stool) and nausea. Negative for abdominal pain and vomiting.   Genitourinary:  Negative for dysuria and hematuria.   Musculoskeletal:  Negative for back pain, neck pain and neck stiffness.   Skin:  Negative for rash and wound.   Neurological:  Positive for syncope and light-headedness. Negative for weakness and numbness.   Hematological:  Does not bruise/bleed easily.       Physical Exam     Initial Vitals [04/02/25 2153]   BP Pulse Resp Temp SpO2   122/81 94 16 97.4 °F (36.3 °C) 100 %      MAP       --         Physical Exam    Nursing note and vitals reviewed.  Constitutional: He appears well-developed.   Eyes: EOM are normal. Pupils are equal, round, and reactive to light.   Cardiovascular:  Normal rate, regular rhythm, normal heart sounds and intact distal pulses.           No murmur heard.  Palpable pulses.    Pulmonary/Chest: Breath sounds normal. No respiratory distress. He has no wheezes. He has  no rales.   Abdominal: Abdomen is soft. He exhibits distension (mild). There is no abdominal tenderness.   Genitourinary:    Genitourinary Comments: Rectal exam chaperoned with male RN in the room.   Dark stool with small amount of gross blood.   Hemoccult positive.   No active hemorrhage.        Neurological: He is alert and oriented to person, place, and time. He has normal strength. No cranial nerve deficit or sensory deficit. GCS score is 15. GCS eye subscore is 4. GCS verbal subscore is 5. GCS motor subscore is 6.   Awake and alert.   Intact sensation to all extremities.    Skin: Skin is warm. Capillary refill takes less than 2 seconds. No rash noted.         ED Course   Critical Care    Date/Time: 4/2/2025 11:26 PM    Performed by: Salo Du MD  Authorized by: Salo Du MD  Direct patient critical care time: 20 minutes  Ordering / reviewing critical care time: 4 minutes  Documentation critical care time: 6 minutes  Consulting other physicians critical care time: 5 minutes  Total critical care time (exclusive of procedural time) : 35 minutes  Critical care time was exclusive of separately billable procedures and treating other patients.  Critical care was necessary to treat or prevent imminent or life-threatening deterioration of the following conditions: GI bleed.  Critical care was time spent personally by me on the following activities: development of treatment plan with patient or surrogate, discussions with consultants, evaluation of patient's response to treatment, interpretation of cardiac output measurements, examination of patient, obtaining history from patient or surrogate, ordering and performing treatments and interventions, ordering and review of laboratory studies, ordering and review of radiographic studies, pulse oximetry and re-evaluation of patient's condition.        Labs Reviewed   COMPREHENSIVE METABOLIC PANEL - Abnormal       Result Value    Sodium 137       Potassium 4.5      Chloride 108 (*)     CO2 21 (*)     Glucose 128 (*)     Blood Urea Nitrogen 27.1 (*)     Creatinine 0.80      Calcium 8.2 (*)     Protein Total 5.3 (*)     Albumin 3.1 (*)     Globulin 2.2 (*)     Albumin/Globulin Ratio 1.4      Bilirubin Total 0.2      ALP 49      ALT 7      AST 7 (*)     eGFR >60      Anion Gap 8.0      BUN/Creatinine Ratio 34     CBC WITH DIFFERENTIAL - Abnormal    WBC 22.70 (*)     RBC 1.72 (*)     Hgb 5.4 (*)     Hct 16.6 (*)     MCV 96.5 (*)     MCH 31.4 (*)     MCHC 32.5 (*)     RDW 15.0      Platelet 307      MPV 10.2      Neut % 74.0      Lymph % 15.4      Mono % 7.4      Eos % 0.2      Basophil % 0.3      Imm Grans % 2.7      Neut # 16.82 (*)     Lymph # 3.49      Mono # 1.68 (*)     Eos # 0.04      Baso # 0.06      Imm Gran # 0.61 (*)     NRBC% 1.9     BLOOD SMEAR MICROSCOPIC EXAM (OLG) - Abnormal    RBC Morph Abnormal (*)     Anisocytosis 1+ (*)     Monique Cells Slight (*)     Macrocytosis 1+ (*)     Microcytosis 2+ (*)     Poikilocytosis 1+ (*)     Polychromasia 1+ (*)     Platelets Normal     PROTIME-INR - Abnormal    PT 14.7 (*)     INR 1.1      Narrative:     Protimes are used to monitor anticoagulant agents such as warfarin. PT INR values are based on the current patient normal mean and the ALAN value for the specific instrument reagent used.  **Routine theraputic target values for the INR are 2.0-3.0**   APTT - Abnormal    PTT 22.7 (*)    TROPONIN I - Normal    Troponin-I <0.010     B-TYPE NATRIURETIC PEPTIDE - Normal    Natriuretic Peptide <10.0     BLOOD CULTURE OLG   BLOOD CULTURE OLG   CBC W/ AUTO DIFFERENTIAL    Narrative:     The following orders were created for panel order CBC auto differential.  Procedure                               Abnormality         Status                     ---------                               -----------         ------                     CBC with Differential[[id number]]       Abnormal            Final result                  Please view results for these tests on the individual orders.   LACTIC ACID, PLASMA   COMPREHENSIVE METABOLIC PANEL   CBC W/ AUTO DIFFERENTIAL    Narrative:     The following orders were created for panel order CBC auto differential.  Procedure                               Abnormality         Status                     ---------                               -----------         ------                     CBC with Differential[[id number]]                                                        Please view results for these tests on the individual orders.   CBC WITH DIFFERENTIAL   TYPE & SCREEN    Group & Rh A NEG      Indirect Willie GEL NEG      Specimen Outdate 04/06/2025 23:59     PREPARE RBC SOFT    UNIT NUMBER [id number]      UNIT ABO/RH A NEG      DISPENSE STATUS Selected      Unit Expiration [id number]      Product Code D4781M97      Unit Blood Type Code 0600      CROSSMATCH INTERPRETATION Compatible      UNIT NUMBER [id number]      UNIT ABO/RH A NEG      DISPENSE STATUS Issued      Unit Expiration [id number]      Product Code J2155Y99      Unit Blood Type Code 0600      CROSSMATCH INTERPRETATION Compatible            Imaging Results              CT Abdomen Pelvis W Wo Contrast (In process)                      X-Ray Chest AP (In process)                      Medications   0.9%  NaCl infusion (for blood administration) (has no administration in time range)   piperacillin-tazobactam (ZOSYN) 4.5 g in D5W 100 mL IVPB (MB+) (4.5 g Intravenous New Bag 4/3/25 0500)   sodium chloride 0.9% flush 10 mL (has no administration in time range)   melatonin tablet 6 mg (has no administration in time range)   morphine injection 2 mg (has no administration in time range)   acetaminophen tablet 650 mg (has no administration in time range)   morphine injection 4 mg (has no administration in time range)   famotidine tablet 20 mg (has no administration in time range)   ondansetron injection 4 mg (has no  administration in time range)   lactated ringers bolus 1,000 mL (0 mLs Intravenous Stopped 4/3/25 0115)   pantoprazole injection 80 mg (80 mg Intravenous Given 4/2/25 3656)   iohexoL (OMNIPAQUE 350) injection 100 mL (100 mLs Intravenous Given 4/3/25 0114)   ondansetron injection 4 mg (4 mg Intramuscular Given 4/3/25 0146)   magnesium citrate solution 296 mL (296 mLs Oral Given 4/3/25 0321)     Medical Decision Making  Problems Addressed:  SOB (shortness of breath): acute illness or injury  Symptomatic anemia: acute illness or injury    Amount and/or Complexity of Data Reviewed  Labs: ordered. Decision-making details documented in ED Course.  Radiology: ordered. Decision-making details documented in ED Course.  ECG/medicine tests: ordered and independent interpretation performed. Decision-making details documented in ED Course.    Risk  OTC drugs.  Prescription drug management.  Decision regarding hospitalization.    Differential diagnosis (includes but is not limited to):   ACS, arrhythmia, electrolyte abnormalities, dehydration, kidney injury, symptomatic anemia, upper GI bleed, GERD/PUD, lower GI bleed    MDM Narrative  55-year-old male presents for episode of nearly passing out at home as well as some worsening shortness of breath on exertion and low blood pressure at home.  EKG reviewed.  Labs reviewed.  Critical anemia with a hemoglobin 5.4 and hematocrit 16.6 noted.  Leukocytosis significantly elevated at 22.7.  Patient is afebrile and hemodynamically stable however will cover with antibiotics given significant leukocytosis and ongoing symptoms.  IV fluid rehydration ordered, Zosyn ordered for antibacterial coverage, blood cultures pending, lactic acid within normal limits.  Request rectal exam performed with Hemoccult-positive stool.  CT abdomen pelvis with and without contrast performed, radiologist called to discuss that he does not see a very subtle contrast blush in the rectum, no acute evidence of  "infection.  No evidence of sepsis.  Transfusion of 2 units packed red blood cells ordered.  GI consulted case discussed, will see the patient in consult, recommends admission to medicine.  Internal medicine consulted and will admit.  Patient remains afebrile, hemodynamically stable.     Dispo: Admit    My independent radiology interpretation: as above  Point of care US (independently performed and interpreted):   Decision rules/clinical scoring:     Sepsis Perfusion Assessment: "I attest a sepsis perfusion exam was performed within 6 hours of sepsis, severe sepsis, or septic shock presentation, following fluid resuscitation."     Amount and/or Complexity of Data Reviewed  Independent historian: none   Summary of history:   External data reviewed: notes from previous ED visits and notes from clinic visits  Summary of data reviewed: Prior records reviewed  Risk and benefits of testing: discussed   Labs: ordered and reviewed  Radiology: ordered and independent interpretation performed (see above or ED course)  ECG/medicine tests: ordered and independent interpretation performed (see above or ED course)  Discussion of management or test interpretation with external provider(s): discussed with hospitalist physician and discussed with GI consultant   Summary of discussion: as above    Risk  Parenteral controlled substances   Drug therapy requiring intense monitoring for toxicity   Decision regarding hospitalization  Shared decision making     Critical Care  30-74 minutes     Data Reviewed/Counseling: I have personally reviewed the patient's vital signs, nursing notes, and other relevant tests, information, and imaging. I had a detailed discussion regarding the historical points, exam findings, and any diagnostic results supporting the discharge diagnosis. I personally performed the history, PE, MDM and procedures as documented above and agree with the scribe's documentation.    Portions of this note were dictated using " voice recognition software. Although it was reviewed for accuracy, some inherent voice recognition errors may have occurred and may be present in this document.          Scribe Attestation:   Scribe #1: I performed the above scribed service and the documentation accurately describes the services I performed. I attest to the accuracy of the note.    Attending Attestation:           Physician Attestation for Scribe:  Physician Attestation Statement for Scribe #1: I, Salo Du MD, reviewed documentation, as scribed by Jacinto Archer in my presence, and it is both accurate and complete.             ED Course as of 04/03/25 0513   Wed Apr 02, 2025   2336 X-Ray Chest AP  Independently visualized/reviewed by me during the ED visit.  - No lobar consolidation or PTX [MC]   Thu Apr 03, 2025   0000 EKG independently interpreted by me.  EKG: NSR @ 91, no STEMI, Qtc 445 [MC]      ED Course User Index  [MC] Salo Du MD                           Clinical Impression:  Final diagnoses:  [R06.02] SOB (shortness of breath)  [D64.9] Symptomatic anemia (Primary)  [K92.2] Gastrointestinal hemorrhage, unspecified gastrointestinal hemorrhage type          ED Disposition Condition    Admit Stable                    [1]   Social History  Tobacco Use    Smoking status: Never    Smokeless tobacco: Never   Substance Use Topics    Alcohol use: Not Currently     Comment: A few times in a year    Drug use: Never        Salo Du MD  04/03/25 0599

## 2025-04-03 NOTE — ANESTHESIA PREPROCEDURE EVALUATION
04/03/2025  Hayden Puckett is a 55 y.o., male with a BMI of 50/QUEENIE , CAD status post stent and angioplasty with preserved admitted April 2nd after presenting with syncopal episode and further workup revealed severe anemia requiring transfusion.  Suspected brisk bleeding (reported nonresponsive to transfusion ) and patient presents today for emergent EGD.  Patient has been off Plavix for 1 day and patient also notes improvement in syncopal symptoms after 2 units PRBCs with no abdominal pain or emesis.    Left heart catheterization 2022   EF 65% with normal wall motion  Left main/LAD/left circumflex nonobstructive disease   RCA occluded distally with collateralized flow treated with balloon angioplasty      Pre-op Assessment    I have reviewed the Patient Summary Reports.    I have reviewed the NPO Status.   I have reviewed the Medications.     Review of Systems  Anesthesia Hx:               Denies Personal Hx of Anesthesia complications.                    Social:  Non-Smoker       Hematology/Oncology:       -- Anemia:                                  Cardiovascular:     Hypertension   CAD                  Functional Capacity good / => 4 METS                         Pulmonary:        Sleep Apnea, CPAP                Endocrine:  Diabetes, type 2 Hypothyroidism        Morbid Obesity / BMI > 40      Physical Exam  General: Well nourished, Cooperative, Alert and Oriented    Airway:  Mallampati: II   Mouth Opening: Normal  TM Distance: Normal  Tongue: Normal  Neck ROM: Normal ROM    Dental:  Intact    Chest/Lungs:  Clear to auscultation, Normal Respiratory Rate    Heart:  Rate: Normal  Rhythm: Regular Rhythm        Anesthesia Plan  Type of Anesthesia, risks & benefits discussed:    Anesthesia Type: Gen Natural Airway  Intra-op Monitoring Plan: Standard ASA Monitors  Induction:  IV  Informed Consent: Informed  consent signed with the Patient and all parties understand the risks and agree with anesthesia plan.  All questions answered.   ASA Score: 4 Emergent  Day of Surgery Review of History & Physical: H&P Update referred to the surgeon/provider.  Anesthesia Plan Notes: Etomidate and propofol    Ready For Surgery From Anesthesia Perspective.     .

## 2025-04-03 NOTE — PR[employer]ATION PATIENT INSTRUCTIONS
Discharge Summary/Instructions after an Endoscopic Procedure  Patient Name: Hayden Puckett  Patient MRN: [id number]  Patient YOB: 1970  Thursday, April 3, 2025  Faraz Alfred MD  Dear patient,  As a result of recent federal legislation (The Federal Cures Act), you may   receive lab or pathology results from your procedure in your [facility]   account before your physician is able to contact you. Your physician or   their representative will relay the results to you with their   recommendations at their soonest availability.  Thank you,  RESTRICTIONS:  During your procedure today, you received medications for sedation.  These   medications may affect your judgment, balance and coordination.  Therefore,   for 24 hours, you have the following restrictions:   - DO NOT drive a car, operate machinery, make legal/financial decisions,   sign important papers or drink alcohol.    ACTIVITY:  Today: no heavy lifting, straining or running due to procedural   sedation/anesthesia.  The following day: return to full activity including work.  DIET:  Eat and drink normally unless instructed otherwise.     TREATMENT FOR COMMON SIDE EFFECTS:  - Mild abdominal pain, nausea, belching, bloating or excessive gas:  rest,   eat lightly and use a heating pad.  - Sore Throat: treat with throat lozenges and/or gargle with warm salt   water.  - Because air was used during the procedure, expelling large amounts of air   from your rectum or belching is normal.  - If a bowel prep was taken, you may not have a bowel movement for 1-3 days.    This is normal.  SYMPTOMS TO WATCH FOR AND REPORT TO YOUR PHYSICIAN:  1. Abdominal pain or bloating, other than gas cramps.  2. Chest pain.  3. Back pain.  4. Signs of infection such as: chills or fever occurring within 24 hours   after the procedure.  5. Rectal bleeding, which would show as bright red, maroon, or black stools.   (A tablespoon of blood from the rectum is not serious, especially  if   hemorrhoids are present.)  6. Vomiting.  7. Weakness or dizziness.  GO DIRECTLY TO THE NEAREST EMERGENCY ROOM IF YOU HAVE ANY OF THE FOLLOWING:      Difficulty breathing              Chills and/or fever over 101 F   Persistent vomiting and/or vomiting blood   Severe abdominal pain   Severe chest pain   Black, tarry stools   Bleeding- more than one tablespoon   Any other symptom or condition that you feel may need urgent attention  Your doctor recommends these additional instructions:  If any biopsies were taken, your doctors clinic will contact you in 1 to 2   weeks with any results.  Recommendations:  - Return patient to [facility] for ongoing care.   - Resume previous diet.   - Continue present medications.   - Needs BID PPI x8 weeks  - Avoid NSAIDs indefinitely; Tylenol only  - Carafate QID x1 month  - Repeat EGD in 2 months to assess healing to be done in hospital setting  Impressions:  Emergent EGD for melena and Hb from 5.4 -> 5.9 s/p blood transfusion.  - Normal esophagus.   - A Hugo-en-Y anastomosis was found, characterized by ulceration.  SKIP.    Treated with argon beam coagulation.  hemostatic spray applied.   - Normal examined jejunum.   - No specimens collected.  For questions, problems or results please call your physician - Faraz Alfred MD at Work:  [phone].  [facility] ED at [phone]  IF A COMPLICATION OR EMERGENCY SITUATION ARISES AND YOU ARE UNABLE TO REACH   YOUR PHYSICIAN - GO DIRECTLY TO THE EMERGENCY ROOM.  MD Faraz Johnson MD  4/3/2025 9:30:40 AM  This report has been verified and signed electronically.  Dear patient,  As a result of recent federal legislation (The Federal Cures Act), you may   receive lab or pathology results from your procedure in your [facility]   account before your physician is able to contact you. Your physician or   their representative will relay the results to you with their   recommendations at  their soonest availability.  Thank you,  PROVATION

## 2025-04-03 NOTE — ANESTHESIA POSTPROCEDURE EVALUATION
Anesthesia Post Evaluation    Patient: Hayden Puckett    Procedure(s) Performed: Procedure(s) (LRB):  EGD (N/A)    Final Anesthesia Type: general      Patient location during evaluation: floor  Patient participation: Yes- Able to Participate  Level of consciousness: awake and alert  Post-procedure vital signs: reviewed and stable  Pain management: adequate  Airway patency: patent    PONV status at discharge: No PONV  Anesthetic complications: no      Cardiovascular status: blood pressure returned to baseline  Respiratory status: spontaneous ventilation and room air  Hydration status: euvolemic  Follow-up not needed.              Vitals Value Taken Time   /66 04/03/25 09:50   Temp 36.4 °C (97.5 °F) 04/03/25 09:29   Pulse 78 04/03/25 09:50   Resp 23 04/03/25 09:50   SpO2 99 % 04/03/25 09:50         No case tracking events are documented in the log.      Pain/Kelly Score: Kelly Score: 10 (4/3/2025  9:35 AM)

## 2025-04-03 NOTE — TRANSFER OF CARE
"Anesthesia Transfer of Care Note    Patient: Hayden Puckett    Procedure(s) Performed: Procedure(s) (LRB):  EGD (N/A)    Patient location: GI    Anesthesia Type: general    Transport from OR: Transported from OR on room air with adequate spontaneous ventilation    Post pain: adequate analgesia    Post assessment: no apparent anesthetic complications    Post vital signs: stable    Level of consciousness: awake, alert and oriented    Nausea/Vomiting: no nausea/vomiting    Complications: none    Transfer of care protocol was followed      Last vitals: Visit Vitals  /61 (BP Location: Right arm, Patient Position: Lying)   Pulse 84   Temp 36.7 °C (98.1 °F)   Resp 18   Ht 5' 7" (1.702 m)   Wt (!) 145.2 kg (320 lb)   SpO2 100%   BMI 50.12 kg/m²     "

## 2025-04-03 NOTE — PLAN OF CARE
Pt is , 2 living children, no living will or POA.    04/03/25 1317   Discharge Assessment   Assessment Type Discharge Planning Assessment   Confirmed/corrected address, phone number and insurance Yes   Confirmed Demographics Correct on Facesheet   Source of Information patient   When was your last doctors appointment?   (PCP Sandy)   Communicated GAGAN with patient/caregiver Date not available/Unable to determine   People in Home spouse   Do you expect to return to your current living situation? Yes   Do you have help at home or someone to help you manage your care at home? Yes   Who are your caregiver(s) and their phone number(s)? wife Tasha Puckett [phone]   Prior to hospitilization cognitive status: Unable to Assess   Current cognitive status: Alert/Oriented   Walking or Climbing Stairs Difficulty no   Dressing/Bathing Difficulty no   Home Accessibility wheelchair accessible;stairs within home   Stairs, Within Home, Primary 15-20   Home Layout Able to live on 1st floor   Equipment Currently Used at Home BIPAP;blood pressure machine   Readmission within 30 days? No   Patient currently being followed by outpatient case management? No   Do you currently have service(s) that help you manage your care at home? No   Do you take prescription medications? Yes  (fills with [facility] on [address])   Do you have prescription coverage? Yes   Coverage BCBS   Do you have any problems affording any of your prescribed medications? TBD   Is the patient taking medications as prescribed? yes   Who is going to help you get home at discharge? wife   How do you get to doctors appointments? car, drives self;family or friend will provide   Are you on dialysis? No   Do you take coumadin? No   Discharge Plan A Other  (TBD)   DME Needed Upon Discharge  other (see comments)  (TBD)   Discharge Plan discussed with: Patient   Transition of Care Barriers None   Physical Activity   On average, how many days per week do you engage in  moderate to strenuous exercise (like a brisk walk)? 7 days   On average, how many minutes do you engage in exercise at this level? 30 min   Financial Resource Strain   How hard is it for you to pay for the very basics like food, housing, medical care, and heating? Not very   Housing Stability   In the last 12 months, was there a time when you were not able to pay the mortgage or rent on time? N   At any time in the past 12 months, were you homeless or living in a shelter (including now)? N   Transportation Needs   In the past 12 months, has lack of transportation kept you from medical appointments or from getting medications? no   In the past 12 months, has lack of transportation kept you from meetings, work, or from getting things needed for daily living? No   Food Insecurity   Within the past 12 months, you worried that your food would run out before you got the money to buy more. Never true   Within the past 12 months, the food you bought just didn't last and you didn't have money to get more. Never true   Stress   Do you feel stress - tense, restless, nervous, or anxious, or unable to sleep at night because your mind is troubled all the time - these days? Not at all   Social Isolation   How often do you feel lonely or isolated from those around you?  Never   Alcohol Use   Q1: How often do you have a drink containing alcohol? Never   Q2: How many drinks containing alcohol do you have on a typical day when you are drinking? None   Q3: How often do you have six or more drinks on one occasion? Never   Utilities   In the past 12 months has the electric, gas, oil, or water company threatened to shut off services in your home? No   Health Literacy   How often do you need to have someone help you when you read instructions, pamphlets, or other written material from your doctor or pharmacy? Rarely   OTHER   Name(s) of People in Home wife Tasha Puckett

## 2025-04-03 NOTE — CONSULTS
A Gastroenterology NOTE    CC:  Melena    HPI: 55 y.o. male GI consulted for melena.  This has been ongoing for a few days now.  Associated with fatigue and shortness of breath.  Also had presyncopal symptoms.  Denies any hematemesis or coffee-ground emesis.  This has never happened before.  No clear triggers per patient report.    PMH:  Obesity, CAD, DM, HLD, HTN, gastric bypass    Physical Examination:  Tired appearing. Obese. Linear. Abdomen soft, NT, ND, dullness.     Assessment:  #Melena - Hb baseline about 13. Ddx of anastomotic bleeding, PUD, esophagitis, ischemia  #Obesity  #Hx of Gastric Sleeve    Elevated WBC. Hb  5.4 -> 5.9 (after blood transfusion).     Medical records reviewed   Additional history obtained from spouse at bedside     Reviewed chest x-ray with no mediastinal widening or gastric distention per my review and interpretation    Plan:  -emergent EGD today given inadequate response to blood transfusion; further recommendations to follow under chart review -> procedures  -high-dose PPI    ADDENDUM: EGD findings consistent with RYGB anatomy and marginal ulcer s/p APC, marked with clip, and nexspray. Please appreciate for report under chart review -> procedures    Faraz Alfred MD  [facility] Gastroenterology

## 2025-04-03 NOTE — ED NOTES
Procalcitonin not drawn upon receiving pt. Pt RBC infusing and will require at least 1 hr after blood infusion for draw per lab staff.

## 2025-04-03 NOTE — H&P
[facility] - Emergency Dept    History & Physical      Patient Name: Hayden Puckett  MRN: [id number]  Admission Date: 4/2/2025  Attending Physician: Peyman Cook MD   Primary Care Provider: Alex Calvillo Sr., MD         Patient information was obtained from ER records.     Subjective:     Principal Problem:<principal problem not specified>    Chief Complaint:   Chief Complaint   Patient presents with    Loss of Consciousness     Patient here with c/o passing out, shortness of breath on exertion, low blood pressure, headache, lightheadedness, and dizziness since Monday. Had a syncopal episode on Monday was seen [facility]. Was taking Amlodipine/Benazepril 5-40mg and HCTZ 25mg hasn't taken medication since Monday. Had a gastric sleeve 12/05/2024.         HPI: 54 y/o moridly obese male with history of CAD, DM, HLD, HTN, and gastric bypass surgery presents to ED c/o syncope onset Monday. Pt reports he was leaving work, when he started to feel lightheaded, and had 2x back-to- back syncopal episodes. He reports EMS on scene told him his blood pressure was low and went to [facility]. He reports while at [facility], he was taken off his BP meds. He reports since onset, he has been having lightheadedness when standing up and exerting himself. He also complains of melanic stool, nausea, and mild SOB since onset. He denies any abdominal pain, shortness of breath, chest pain, or vomiting. Further work up sugestive of severe symptomatic acute blood loss anemia and subsequentlya dmitted for blood transfusions and gi consult for acute gib    Past Medical History:   Diagnosis Date    Coronary artery disease     Diabetes mellitus, type 2     Hyperlipidemia     Hypertension     Hypogonadism in male     Hypothyroidism associated with surgical procedure     Insulin resistance     Obesity,     QUEENIE (obstructive sleep apnea)     Sleep apnea     Vitamin D deficiency        Past Surgical History:   Procedure  Laterality Date    COLONOSCOPY N/A 11/23/2022    Procedure: COLON;  Surgeon: Drake Farley MD;  Location: [facility] ENDOSCOPY;  Service: Gastroenterology;  Laterality: N/A;    LAPAROSCOPIC GASTROENTEROSTOMY N/A 12/5/2024    Procedure: GASTROENTEROSTOMY, LAPAROSCOPIC W/ INTRA OP EGD;  Surgeon: Darien Bai Jr., MD;  Location: [facility] OR [address] [place] [address];  Service: General;  Laterality: N/A;  VASYL PT  WAL#  [id number]   BMI 66.33   [employer] REP NOTIFIED anesthesia to complete regional block    LEFT HEART CATHETERIZATION  2022    MANDIBLE FRACTURE SURGERY      THYROID SURGERY      TONSILLECTOMY         Review of patient's allergies indicates:  No Known Allergies    No current facility-administered medications on file prior to encounter.     Current Outpatient Medications on File Prior to Encounter   Medication Sig    amLODIPine-benazepriL (LOTREL) 5-40 mg per capsule Take 1 capsule by mouth 2 (two) times daily. (Patient taking differently: Take 1 capsule by mouth once daily.)    aspirin (ECOTRIN) 81 MG EC tablet Take 1 tablet (81 mg total) by mouth once daily.    cholecalciferol, vitamin D3, (VITAMIN D3) 50 mcg (2,000 unit) Cap capsule Take by mouth once daily.    clopidogreL (PLAVIX) 75 mg tablet Take 75 mg by mouth once daily.    gabapentin (NEURONTIN) 300 MG capsule OPEN capsule into a tablespoon and consume with sip of liquid. Take once the MORNING OF SURGERY. After surgery: take one capsule TWICE DAILY for at least 3 days and up to 5 days as needed for pain. (Patient not taking: Reported on 2/10/2025)    hydroCHLOROthiazide (HYDRODIURIL) 25 MG tablet Take 1 tablet (25 mg total) by mouth once daily.    levothyroxine (SYNTHROID) 125 MCG tablet Take 1 tablet (125 mcg total) by mouth before breakfast.    omeprazole (PRILOSEC) 40 MG capsule Take 1 capsule (40 mg total) by mouth every morning. Open capsule and take with apple sauce    ondansetron (ZOFRAN-ODT) 8 MG TbDL Take 1 tablet (8 mg total) by mouth every 6  (six) hours as needed (Nausea). (Patient not taking: Reported on 2/10/2025)    pravastatin (PRAVACHOL) 40 MG tablet Take 40 mg by mouth once daily.    promethazine (PHENERGAN) 12.5 MG Supp Unwrap and insert 1 suppository (12.5 mg total) rectally every 6 (six) hours as needed (nausea, 2nd line). (Patient not taking: Reported on 2/10/2025)    psyllium (METAMUCIL) powder Take 1 packet by mouth once daily.    ranolazine (RANEXA) 1,000 mg Tb12 Take 1,000 mg by mouth 2 (two) times daily.    ursodioL (ACTIGALL) 500 MG tablet Take 1 tablet (500 mg total) by mouth once daily. For gallstone prevention.     Family History       Problem Relation (Age of Onset)    Breast cancer Maternal Grandmother    Diabetes Mellitus Paternal Grandmother    Hypertension Mother, Father    Lung cancer Maternal Grandfather          Tobacco Use    Smoking status: Never    Smokeless tobacco: Never   Substance and Sexual Activity    Alcohol use: Not Currently     Comment: A few times in a year    Drug use: Never    Sexual activity: Not on file     Review of Systems   Constitutional:  Positive for fatigue.   HENT: Negative.     Eyes: Negative.    Respiratory:  Positive for shortness of breath.    Gastrointestinal:  Positive for blood in stool.   Endocrine: Negative.    Genitourinary: Negative.    Musculoskeletal: Negative.    Skin: Negative.    Allergic/Immunologic: Negative.    Neurological:  Positive for syncope and weakness.   Psychiatric/Behavioral: Negative.       Objective:     Vital Signs (Most Recent):  Temp: 98.2 °F (36.8 °C) (04/03/25 0631)  Pulse: 79 (04/03/25 0703)  Resp: (!) 24 (04/03/25 0703)  BP: 132/69 (04/03/25 0702)  SpO2: 99 % (04/03/25 0703) Vital Signs (24h Range):  Temp:  [97.4 °F (36.3 °C)-99.5 °F (37.5 °C)] 98.2 °F (36.8 °C)  Pulse:  [] 79  Resp:  [14-24] 24  SpO2:  [97 %-100 %] 99 %  BP: (115-156)/(62-89) 132/69     Weight: (!) 145.2 kg (320 lb)  Body mass index is 50.12 kg/m².    Physical Exam  Vitals reviewed.    Constitutional:       Appearance: Normal appearance.   HENT:      Head: Normocephalic and atraumatic.      Right Ear: Tympanic membrane and external ear normal.      Nose: Nose normal.      Mouth/Throat:      Mouth: Mucous membranes are moist.   Eyes:      Extraocular Movements: Extraocular movements intact.      Pupils: Pupils are equal, round, and reactive to light.   Cardiovascular:      Rate and Rhythm: Normal rate and regular rhythm.      Pulses: Normal pulses.      Heart sounds: Normal heart sounds.   Pulmonary:      Effort: Pulmonary effort is normal.      Breath sounds: Normal breath sounds.   Abdominal:      General: Abdomen is flat. Bowel sounds are normal.      Palpations: Abdomen is soft.   Musculoskeletal:         General: Normal range of motion.      Cervical back: Normal range of motion and neck supple.   Skin:     General: Skin is warm and dry.   Neurological:      General: No focal deficit present.      Mental Status: He is alert and oriented to person, place, and time.   Psychiatric:         Mood and Affect: Mood normal.         Behavior: Behavior normal.           CRANIAL NERVES     CN III, IV, VI   Pupils are equal, round, and reactive to light.      Significant Labs: All pertinent labs within the past 24 hours have been reviewed.  Lab Results   Component Value Date    WBC 24.34 (H) 04/03/2025    WBC 24.34 04/03/2025    HGB 5.9 (LL) 04/03/2025    HCT 18.4 (LL) 04/03/2025    MCV 96.8 (H) 04/03/2025     04/03/2025         Recent Labs   Lab 04/03/25  0558      K 3.9      CO2 23   BUN 23.1   CREATININE 0.70*   GLUCOSE 118*   CALCIUM 8.1*       Significant Imaging: I have reviewed all pertinent imaging results/findings within the past 24 hours.    Assessment/Plan:     There are no hospital problems to display for this patient.    VTE Risk Mitigation (From admission, onward)           Ordered     IP VTE HIGH RISK PATIENT  Once         04/03/25 0229     Place sequential compression  device  Until discontinued         04/03/25 0229                  Acute blood loss anemia  Symptomatic anemia  Profound anemia  Morbid obesity  Leukocytosis likley reactive  Morbid obesity  CAD, DM, HLD, HTN, and gastric bypass   Plan :  Iv ppi  Transfuse 2 units prbs  Gi consult  Ua  Check procal  Resume home meds  Labs in am  Gi and dvt ppx  Scds  Full code  Guarded prognosis  Criticallyill  Cc 31 min      Peyman Cook MD  Department of Hospital Medicine   [facility] - Emergency Dept

## 2025-04-04 LAB
ABO + RH BLD: NORMAL
ALBUMIN SERPL-MCNC: 2.9 G/DL (ref 3.5–5)
ALBUMIN/GLOB SERPL: 1.4 RATIO (ref 1.1–2)
ALP SERPL-CCNC: 48 UNIT/L (ref 40–150)
ALT SERPL-CCNC: 7 UNIT/L (ref 0–55)
ANION GAP SERPL CALC-SCNC: 6 MEQ/L
AST SERPL-CCNC: 12 UNIT/L (ref 11–45)
BASOPHILS # BLD AUTO: 0.09 X10(3)/MCL
BASOPHILS NFR BLD AUTO: 0.5 %
BILIRUB SERPL-MCNC: 0.4 MG/DL
BLD PROD TYP BPU: NORMAL
BLOOD UNIT EXPIRATION DATE: NORMAL
BLOOD UNIT TYPE CODE: 600
BUN SERPL-MCNC: 15.7 MG/DL (ref 8.4–25.7)
CALCIUM SERPL-MCNC: 7.8 MG/DL (ref 8.4–10.2)
CHLORIDE SERPL-SCNC: 107 MMOL/L (ref 98–107)
CO2 SERPL-SCNC: 25 MMOL/L (ref 22–29)
CREAT SERPL-MCNC: 0.8 MG/DL (ref 0.72–1.25)
CREAT/UREA NIT SERPL: 20
CROSSMATCH INTERPRETATION: NORMAL
DISPENSE STATUS: NORMAL
EOSINOPHIL # BLD AUTO: 0.12 X10(3)/MCL (ref 0–0.9)
EOSINOPHIL NFR BLD AUTO: 0.7 %
ERYTHROCYTE [DISTWIDTH] IN BLOOD BY AUTOMATED COUNT: 18.6 % (ref 11.5–17)
GFR SERPLBLD CREATININE-BSD FMLA CKD-EPI: >60 ML/MIN/1.73/M2
GLOBULIN SER-MCNC: 2.1 GM/DL (ref 2.4–3.5)
GLUCOSE SERPL-MCNC: 91 MG/DL (ref 74–100)
HCT VFR BLD AUTO: 21.8 % (ref 42–52)
HCT VFR BLD AUTO: 21.9 % (ref 42–52)
HGB BLD-MCNC: 7.5 G/DL (ref 14–18)
HGB BLD-MCNC: 7.5 G/DL (ref 14–18)
IMM GRANULOCYTES # BLD AUTO: 0.34 X10(3)/MCL (ref 0–0.04)
IMM GRANULOCYTES NFR BLD AUTO: 2 %
LYMPHOCYTES # BLD AUTO: 3.22 X10(3)/MCL (ref 0.6–4.6)
LYMPHOCYTES NFR BLD AUTO: 19 %
MCH RBC QN AUTO: 30.5 PG (ref 27–31)
MCHC RBC AUTO-ENTMCNC: 34.2 G/DL (ref 33–36)
MCV RBC AUTO: 89 FL (ref 80–94)
MONOCYTES # BLD AUTO: 1.46 X10(3)/MCL (ref 0.1–1.3)
MONOCYTES NFR BLD AUTO: 8.6 %
NEUTROPHILS # BLD AUTO: 11.68 X10(3)/MCL (ref 2.1–9.2)
NEUTROPHILS NFR BLD AUTO: 69.2 %
NRBC BLD AUTO-RTO: 1.5 %
PLATELET # BLD AUTO: 233 X10(3)/MCL (ref 130–400)
PMV BLD AUTO: 9.7 FL (ref 7.4–10.4)
POCT GLUCOSE: 99 MG/DL (ref 70–110)
POTASSIUM SERPL-SCNC: 4.5 MMOL/L (ref 3.5–5.1)
PROT SERPL-MCNC: 5 GM/DL (ref 6.4–8.3)
RBC # BLD AUTO: 2.46 X10(6)/MCL (ref 4.7–6.1)
SODIUM SERPL-SCNC: 138 MMOL/L (ref 136–145)
UNIT NUMBER: NORMAL
WBC # BLD AUTO: 16.91 X10(3)/MCL (ref 4.5–11.5)

## 2025-04-04 PROCEDURE — 85025 COMPLETE CBC W/AUTO DIFF WBC: CPT | Performed by: INTERNAL MEDICINE

## 2025-04-04 PROCEDURE — 80053 COMPREHEN METABOLIC PANEL: CPT | Performed by: INTERNAL MEDICINE

## 2025-04-04 PROCEDURE — 25000003 PHARM REV CODE 250: Performed by: INTERNAL MEDICINE

## 2025-04-04 PROCEDURE — 36430 TRANSFUSION BLD/BLD COMPNT: CPT

## 2025-04-04 PROCEDURE — 25000003 PHARM REV CODE 250: Performed by: PHYSICIAN ASSISTANT

## 2025-04-04 PROCEDURE — 63600175 PHARM REV CODE 636 W HCPCS: Performed by: INTERNAL MEDICINE

## 2025-04-04 PROCEDURE — 21400001 HC TELEMETRY ROOM

## 2025-04-04 PROCEDURE — P9016 RBC LEUKOCYTES REDUCED: HCPCS | Performed by: INTERNAL MEDICINE

## 2025-04-04 PROCEDURE — 36415 COLL VENOUS BLD VENIPUNCTURE: CPT | Performed by: INTERNAL MEDICINE

## 2025-04-04 PROCEDURE — 86923 COMPATIBILITY TEST ELECTRIC: CPT | Performed by: INTERNAL MEDICINE

## 2025-04-04 PROCEDURE — 85014 HEMATOCRIT: CPT | Performed by: INTERNAL MEDICINE

## 2025-04-04 RX ORDER — PSYLLIUM HUSK 0.4 G
1 CAPSULE ORAL DAILY
Status: DISCONTINUED | OUTPATIENT
Start: 2025-04-04 | End: 2025-04-06 | Stop reason: HOSPADM

## 2025-04-04 RX ORDER — HYDROCODONE BITARTRATE AND ACETAMINOPHEN 500; 5 MG/1; MG/1
TABLET ORAL
Status: DISCONTINUED | OUTPATIENT
Start: 2025-04-04 | End: 2025-04-06 | Stop reason: HOSPADM

## 2025-04-04 RX ORDER — CHOLECALCIFEROL (VITAMIN D3) 25 MCG
1000 TABLET ORAL DAILY
Status: DISCONTINUED | OUTPATIENT
Start: 2025-04-04 | End: 2025-04-06 | Stop reason: HOSPADM

## 2025-04-04 RX ORDER — PNV NO.95/FERROUS FUM/FOLIC AC 28MG-0.8MG
100 TABLET ORAL DAILY
Status: DISCONTINUED | OUTPATIENT
Start: 2025-04-04 | End: 2025-04-04

## 2025-04-04 RX ORDER — VIT C/E/ZN/COPPR/LUTEIN/ZEAXAN 250MG-90MG
500 CAPSULE ORAL DAILY
Status: DISCONTINUED | OUTPATIENT
Start: 2025-04-04 | End: 2025-04-06 | Stop reason: HOSPADM

## 2025-04-04 RX ADMIN — LINACLOTIDE 145 MCG: 145 CAPSULE, GELATIN COATED ORAL at 12:04

## 2025-04-04 RX ADMIN — SUCRALFATE 1 G: 1 TABLET ORAL at 04:04

## 2025-04-04 RX ADMIN — CYANOCOBALAMIN TAB 500 MCG 500 MCG: 500 TAB at 09:04

## 2025-04-04 RX ADMIN — SUCRALFATE 1 G: 1 TABLET ORAL at 12:04

## 2025-04-04 RX ADMIN — RANOLAZINE 1000 MG: 500 TABLET, EXTENDED RELEASE ORAL at 08:04

## 2025-04-04 RX ADMIN — Medication 1 TABLET: at 09:04

## 2025-04-04 RX ADMIN — PANTOPRAZOLE SODIUM 8 MG/HR: 40 INJECTION, POWDER, FOR SOLUTION INTRAVENOUS at 01:04

## 2025-04-04 RX ADMIN — LEVOTHYROXINE SODIUM 125 MCG: 125 TABLET ORAL at 04:04

## 2025-04-04 RX ADMIN — SUCRALFATE 1 G: 1 TABLET ORAL at 07:04

## 2025-04-04 RX ADMIN — THERA TABS 1 TABLET: TAB at 09:04

## 2025-04-04 RX ADMIN — Medication 1000 UNITS: at 09:04

## 2025-04-04 RX ADMIN — RANOLAZINE 1000 MG: 500 TABLET, EXTENDED RELEASE ORAL at 07:04

## 2025-04-04 RX ADMIN — PANTOPRAZOLE SODIUM 8 MG/HR: 40 INJECTION, POWDER, FOR SOLUTION INTRAVENOUS at 07:04

## 2025-04-04 NOTE — NURSING
"I was approached by patients brother requesting to speak to a supervisor in which I told him that I am the supervisor on duty for the unit today. The brother expressed concern that the patient was upset regarding a dietary issue and that I, his nurse was rude regarding the food. The brother and I talked with the patient to understand why he felt uneasy about the conversation the patient and I had regarding salty soup. The patient explained to me that he felt I was rude when I called the dietary host and handed him the phone to have his soup replaced with something he would like as I did not know what types of soup the kitchen could provide for him. As I gave him the phone, I exited the room and left the door open which later he expressed that I didn't close the door like he would have liked, but I was unaware that the patient wanted the door closed. I voiced to the patient if he would have communicated with me to close his door upon leaving, I would have closed it. The patient then stated that he does not want a nurse that is "upset" with him to handle his medication and that he doesn't want to die. I voiced to the patient "coming off rude" is never my intent and his safety is priority no matter the circumstances. Upon leaving the room each time during rounds, the patient/family are asked if I may do something to make them more comfortable and each time the wife and patient told me no. Every measure was taken to ensure safety and comfort for the patient.The family requested the managers contact information in which they were provided the email address along with my name.The patients wife then requested for a Boost to be sent which dietary was contacted to get to the patient.   "

## 2025-04-04 NOTE — PROGRESS NOTES
"Gastroenterology Progress Note    Subjective/Interval History:  One episode of melena after EGD yesterday.  No BMs or signs of bleeding this am.  Tolerating full liquids.  He got OOB and went to the chair and felt ok without weakness, lightheadedness etc.      Was transfused 2 more units packed red blood cells overnight.  H/H did not respond appropriately to transfusion,went from 7.3 to 7.5.  Was transfused another 1u this am.    Vital Signs:  /80   Pulse 76   Temp 98.2 °F (36.8 °C) (Oral)   Resp 18   Ht 5' 7" (1.702 m)   Wt (!) 142.9 kg (315 lb)   SpO2 100%   BMI 49.34 kg/m²   Body mass index is 49.34 kg/m².    Physical Exam:  Physical Exam  Constitutional:       General: He is not in acute distress.     Appearance: He is obese. He is not ill-appearing.   HENT:      Head: Normocephalic and atraumatic.   Eyes:      General: No scleral icterus.     Extraocular Movements: Extraocular movements intact.   Cardiovascular:      Rate and Rhythm: Normal rate and regular rhythm.   Pulmonary:      Effort: Pulmonary effort is normal. No respiratory distress.   Abdominal:      General: Bowel sounds are normal. There is no distension.      Palpations: Abdomen is soft. There is no mass.      Tenderness: There is no abdominal tenderness. There is no guarding or rebound.   Musculoskeletal:      Right lower leg: No edema.      Left lower leg: No edema.   Skin:     General: Skin is warm and dry.      Coloration: Skin is not jaundiced.   Neurological:      Mental Status: He is alert and oriented to person, place, and time.   Psychiatric:         Mood and Affect: Mood normal.         Behavior: Behavior normal.         Labs:  Recent Results (from the past 48 hours)   EKG 12-lead    Collection Time: 04/02/25  9:54 PM   Result Value Ref Range    QRS Duration 84 ms    [facility]HS QTC Calculation 445 ms   Comprehensive metabolic panel    Collection Time: 04/02/25 10:05 PM   Result Value Ref Range    Sodium 137 136 - 145 mmol/L    " Potassium 4.5 3.5 - 5.1 mmol/L    Chloride 108 (H) 98 - 107 mmol/L    CO2 21 (L) 22 - 29 mmol/L    Glucose 128 (H) 74 - 100 mg/dL    Blood Urea Nitrogen 27.1 (H) 8.4 - 25.7 mg/dL    Creatinine 0.80 0.72 - 1.25 mg/dL    Calcium 8.2 (L) 8.4 - 10.2 mg/dL    Protein Total 5.3 (L) 6.4 - 8.3 gm/dL    Albumin 3.1 (L) 3.5 - 5.0 g/dL    Globulin 2.2 (L) 2.4 - 3.5 gm/dL    Albumin/Globulin Ratio 1.4 1.1 - 2.0 ratio    Bilirubin Total 0.2 <=1.5 mg/dL    ALP 49 40 - 150 unit/L    ALT 7 0 - 55 unit/L    AST 7 (L) 11 - 45 unit/L    eGFR >60 mL/min/1.73/m2    Anion Gap 8.0 mEq/L    BUN/Creatinine Ratio 34    Troponin I    Collection Time: 04/02/25 10:05 PM   Result Value Ref Range    Troponin-I <0.010 0.000 - 0.045 ng/mL   Brain natriuretic peptide    Collection Time: 04/02/25 10:05 PM   Result Value Ref Range    Natriuretic Peptide <10.0 <=100.0 pg/mL   CBC with Differential    Collection Time: 04/02/25 10:06 PM   Result Value Ref Range    WBC 22.70 (H) 4.50 - 11.50 x10(3)/mcL    RBC 1.72 (L) 4.70 - 6.10 x10(6)/mcL    Hgb 5.4 (LL) 14.0 - 18.0 g/dL    Hct 16.6 (LL) 42.0 - 52.0 %    MCV 96.5 (H) 80.0 - 94.0 fL    MCH 31.4 (H) 27.0 - 31.0 pg    MCHC 32.5 (L) 33.0 - 36.0 g/dL    RDW 15.0 11.5 - 17.0 %    Platelet 307 130 - 400 x10(3)/mcL    MPV 10.2 7.4 - 10.4 fL    Neut % 74.0 %    Lymph % 15.4 %    Mono % 7.4 %    Eos % 0.2 %    Basophil % 0.3 %    Imm Grans % 2.7 %    Neut # 16.82 (H) 2.1 - 9.2 x10(3)/mcL    Lymph # 3.49 0.6 - 4.6 x10(3)/mcL    Mono # 1.68 (H) 0.1 - 1.3 x10(3)/mcL    Eos # 0.04 0 - 0.9 x10(3)/mcL    Baso # 0.06 <=0.2 x10(3)/mcL    Imm Gran # 0.61 (H) 0.00 - 0.04 x10(3)/mcL    NRBC% 1.9 %   Blood Smear Microscopic Exam    Collection Time: 04/02/25 10:06 PM   Result Value Ref Range    RBC Morph Abnormal (A) Normal    Anisocytosis 1+ (A) (none)    Monique Cells Slight (A) (none)    Macrocytosis 1+ (A) (none)    Microcytosis 2+ (A) (none)    Poikilocytosis 1+ (A) (none)    Polychromasia 1+ (A) (none)    Platelets Normal  Normal, Adequate   Protime-INR    Collection Time: 04/02/25 10:06 PM   Result Value Ref Range    PT 14.7 (H) 12.5 - 14.5 seconds    INR 1.1 <=1.3   APTT    Collection Time: 04/02/25 10:06 PM   Result Value Ref Range    PTT 22.7 (L) 23.2 - 33.7 seconds   Type & Screen    Collection Time: 04/03/25 12:21 AM   Result Value Ref Range    Group & Rh A NEG     Indirect Willie GEL NEG     Specimen Outdate 04/06/2025 23:59    Prepare RBC 2 Units; symptomatic anemia    Collection Time: 04/03/25 12:21 AM   Result Value Ref Range    UNIT NUMBER [id number]     UNIT ABO/RH A NEG     DISPENSE STATUS Transfused     Unit Expiration [id number]     Product Code E6315N20     Unit Blood Type Code 0600     CROSSMATCH INTERPRETATION Compatible     UNIT NUMBER [id number]     UNIT ABO/RH A NEG     DISPENSE STATUS Transfused     Unit Expiration [id number]     Product Code F5706W25     Unit Blood Type Code 0600     CROSSMATCH INTERPRETATION Compatible    Prepare RBC 2 Units; Hemaglobin 5.9    Collection Time: 04/03/25 12:21 AM   Result Value Ref Range    UNIT NUMBER [id number]     UNIT ABO/RH A NEG     DISPENSE STATUS Transfused     Unit Expiration [id number]     Product Code Z5314D37     Unit Blood Type Code 0600     CROSSMATCH INTERPRETATION Compatible     UNIT NUMBER [id number]     UNIT ABO/RH A NEG     DISPENSE STATUS Transfused     Unit Expiration [id number]     Product Code X0179X07     Unit Blood Type Code 0600     CROSSMATCH INTERPRETATION Compatible    Prepare RBC 1 Unit    Collection Time: 04/03/25 12:21 AM   Result Value Ref Range    UNIT NUMBER [id number]     UNIT ABO/RH A NEG     DISPENSE STATUS Transfused     Unit Expiration [id number]     Product Code E9955D56     Unit Blood Type Code 0600     CROSSMATCH INTERPRETATION Compatible    Prepare RBC 1 Unit    Collection Time: 04/03/25 12:21 AM   Result Value Ref Range    UNIT NUMBER [id number]     UNIT ABO/RH A NEG     DISPENSE STATUS Issued     Unit  Expiration [id number]     Product Code Y9796K23     Unit Blood Type Code 0600     CROSSMATCH INTERPRETATION Compatible    Blood Culture #1 **CANNOT BE ORDERED STAT**    Collection Time: 04/03/25  5:51 AM    Specimen: Arm, Right; Blood   Result Value Ref Range    Blood Culture No Growth At 24 Hours    Blood Culture #1 **CANNOT BE ORDERED STAT**    Collection Time: 04/03/25  5:58 AM    Specimen: Antecubital, Right; Blood   Result Value Ref Range    Blood Culture No Growth At 24 Hours    Lactic acid, plasma    Collection Time: 04/03/25  5:58 AM   Result Value Ref Range    Lactic Acid Level 1.9 0.5 - 2.2 mmol/L   Comprehensive metabolic panel    Collection Time: 04/03/25  5:58 AM   Result Value Ref Range    Sodium 136 136 - 145 mmol/L    Potassium 3.9 3.5 - 5.1 mmol/L    Chloride 106 98 - 107 mmol/L    CO2 23 22 - 29 mmol/L    Glucose 118 (H) 74 - 100 mg/dL    Blood Urea Nitrogen 23.1 8.4 - 25.7 mg/dL    Creatinine 0.70 (L) 0.72 - 1.25 mg/dL    Calcium 8.1 (L) 8.4 - 10.2 mg/dL    Protein Total 5.3 (L) 6.4 - 8.3 gm/dL    Albumin 3.1 (L) 3.5 - 5.0 g/dL    Globulin 2.2 (L) 2.4 - 3.5 gm/dL    Albumin/Globulin Ratio 1.4 1.1 - 2.0 ratio    Bilirubin Total 0.3 <=1.5 mg/dL    ALP 48 40 - 150 unit/L    ALT 8 0 - 55 unit/L    AST 9 (L) 11 - 45 unit/L    eGFR >60 mL/min/1.73/m2    Anion Gap 7.0 mEq/L    BUN/Creatinine Ratio 33    CBC with Differential    Collection Time: 04/03/25  5:58 AM   Result Value Ref Range    WBC 24.34 (H) 4.50 - 11.50 x10(3)/mcL    RBC 1.90 (L) 4.70 - 6.10 x10(6)/mcL    Hgb 5.9 (LL) 14.0 - 18.0 g/dL    Hct 18.4 (LL) 42.0 - 52.0 %    MCV 96.8 (H) 80.0 - 94.0 fL    MCH 31.1 (H) 27.0 - 31.0 pg    MCHC 32.1 (L) 33.0 - 36.0 g/dL    RDW 15.4 11.5 - 17.0 %    Platelet 283 130 - 400 x10(3)/mcL    MPV 10.1 7.4 - 10.4 fL   Manual Differential    Collection Time: 04/03/25  5:58 AM   Result Value Ref Range    WBC 24.34 x10(3)/mcL    Neutrophils % 82 %    Lymphs % 11 %    Monocytes % 5 %    Myelocytes % 1 (H) <=0 %     Promyelocytes % 1 (H) <=0 %    Neutrophils Abs 19.9588 (H) 2.1 - 9.2 x10(3)/mcL    Lymphs Abs 2.6774 0.6 - 4.6 x10(3)/mcL    Monocytes Abs 1.217 0.1 - 1.3 x10(3)/mcL    Platelets Normal Normal, Adequate    RBC Morph Abnormal (A) Normal    Polychromasia 1+ (A) (none)    Poikilocytosis 1+ (A) (none)    Stomatocytes 1+ (A) (none)   POCT glucose    Collection Time: 04/03/25  4:41 PM   Result Value Ref Range    POCT Glucose 102 70 - 110 mg/dL   Hemoglobin and Hematocrit    Collection Time: 04/03/25  8:36 PM   Result Value Ref Range    Hgb 7.3 (L) 14.0 - 18.0 g/dL    Hct 21.7 (L) 42.0 - 52.0 %   Hemoglobin and Hematocrit    Collection Time: 04/04/25  2:38 AM   Result Value Ref Range    Hgb 7.5 (L) 14.0 - 18.0 g/dL    Hct 21.8 (L) 42.0 - 52.0 %   Comprehensive Metabolic Panel    Collection Time: 04/04/25  2:42 AM   Result Value Ref Range    Sodium 138 136 - 145 mmol/L    Potassium 4.5 3.5 - 5.1 mmol/L    Chloride 107 98 - 107 mmol/L    CO2 25 22 - 29 mmol/L    Glucose 91 74 - 100 mg/dL    Blood Urea Nitrogen 15.7 8.4 - 25.7 mg/dL    Creatinine 0.80 0.72 - 1.25 mg/dL    Calcium 7.8 (L) 8.4 - 10.2 mg/dL    Protein Total 5.0 (L) 6.4 - 8.3 gm/dL    Albumin 2.9 (L) 3.5 - 5.0 g/dL    Globulin 2.1 (L) 2.4 - 3.5 gm/dL    Albumin/Globulin Ratio 1.4 1.1 - 2.0 ratio    Bilirubin Total 0.4 <=1.5 mg/dL    ALP 48 40 - 150 unit/L    ALT 7 0 - 55 unit/L    AST 12 11 - 45 unit/L    eGFR >60 mL/min/1.73/m2    Anion Gap 6.0 mEq/L    BUN/Creatinine Ratio 20    CBC with Differential    Collection Time: 04/04/25  6:55 AM   Result Value Ref Range    WBC 16.91 (H) 4.50 - 11.50 x10(3)/mcL    RBC 2.46 (L) 4.70 - 6.10 x10(6)/mcL    Hgb 7.5 (L) 14.0 - 18.0 g/dL    Hct 21.9 (L) 42.0 - 52.0 %    MCV 89.0 80.0 - 94.0 fL    MCH 30.5 27.0 - 31.0 pg    MCHC 34.2 33.0 - 36.0 g/dL    RDW 18.6 (H) 11.5 - 17.0 %    Platelet 233 130 - 400 x10(3)/mcL    MPV 9.7 7.4 - 10.4 fL    Neut % 69.2 %    Lymph % 19.0 %    Mono % 8.6 %    Eos % 0.7 %    Basophil % 0.5 %     Imm Grans % 2.0 %    Neut # 11.68 (H) 2.1 - 9.2 x10(3)/mcL    Lymph # 3.22 0.6 - 4.6 x10(3)/mcL    Mono # 1.46 (H) 0.1 - 1.3 x10(3)/mcL    Eos # 0.12 0 - 0.9 x10(3)/mcL    Baso # 0.09 <=0.2 x10(3)/mcL    Imm Gran # 0.34 (H) 0.00 - 0.04 x10(3)/mcL    NRBC% 1.5 %       Imaging:  X-Ray Chest AP  Result Date: 4/3/2025  EXAMINATION: XR CHEST AP PORTABLE CLINICAL HISTORY: Chest Pain;, . COMPARISON: February 7, 2024 FINDINGS: No alveolar consolidation, effusion, or pneumothorax is seen.   The thoracic aorta is normal  cardiac silhouette, central pulmonary vessels and mediastinum are normal in size and are grossly unremarkable.   visualized osseous structures are grossly unremarkable.     No acute chest disease is identified. Electronically signed by: Ameya Ferreira Date:    04/03/2025 Time:    08:46    CT Abdomen Pelvis W Wo Contrast  Result Date: 4/3/2025  EXAMINATION CT ABDOMEN PELVIS W WO CONTRAST CLINICAL HISTORY GI bleed; TECHNIQUE Pre- and post-contrast helical-acquisition CT images were obtained and multiplanar reformats accomplished by a CT technologist at a separate workstation, pushed to PACS for physician review. CONTRAST *IV: Omnipaque 350, 100 mL *Enteric: none COMPARISON None available at the time of initial interpretation. FINDINGS Images were reviewed in soft tissue, lung, and bone windows. Exam quality: adequate for evaluation Lines/tubes: none visualized Cardiopulmonary: Visualized heart chambers are normal volume.  No acute or focal abnormality of the included lower lung zones.  No significant pericardial or pleural fluid. Hepatobiliary/Pancreas: No acute or focal liver abnormality.  Gallbladder is unremarkable. No convincing obstructive biliary process.  There is no evidence of expansile pancreatic lesion, ductal dilatation, or peripancreatic inflammatory changes. Spleen: No acute or focal abnormality. Adrenals/: No suspicious adrenal or renal lesion. No radiodense urolithiasis or evidence of  distal obstructive uropathy.  No focal bladder wall thickening or convincing intraluminal abnormality.  No suspicious findings of the included reproductive structures. Esophagus/GI tract: The visualized lower esophagus is unremarkable.  There are surgical changes of gastric bypass.  No evidence of gastric outlet or small bowel obstruction. No acute inflammatory process or convincing focal lesion. The appendix is normal.  Moderate burden of retained fecal material is present throughout the course of the colon.  No intraluminal contrast density is appreciated to suggest active GI tract hemorrhage. Peritoneal/Extraperitoneal Spaces: No free fluid or air, and no drainable collections.  Included vascular structures are without evidence of acute or focal abnormality.  Minimal burden of calcified aortoiliac mural plaque is present.  No pathologic gary enlargement or necrotic process. Musculoskeletal: No acute process or suspicious focal abnormality.  There are degenerative changes of the visualized spinal column and bony pelvis. IMPRESSION 1. No convincing acute abdominopelvic process.  Specifically, no CT evidence of active GI tract hemorrhage. 2. Incidental findings suggestive of constipation. 3. Additional chronic secondary details discussed above. RADIATION DOSE Automated tube current modulation, weight-based exposure dosing, and/or iterative reconstruction technique utilized to reach lowest reasonably achievable exposure rate. DLP: 4718 mGy*cm Electronically signed by: Josiah Lindsey Date:    04/03/2025 Time:    08:43       Assessment/Plan:  55-year-old male known to Dr. Herring a PMH of obesity, CAD, dm, HTN, HLD, gastric [id number].  Here with melena and syncope.    1.  Melena - secondary to marginal ulcer  2.  Acute symptomatic blood loss anemia   Hgb 5.4 -- 1u prbcs -- 5.9 -- 2u prbcs -- 7.3 -- 2u prbcs -- 7.5 -- 1u prbcs  Hgb was 13 one month ago.   s/p EGD 4/3/25:  Normal esophagus.  Hugo-en-Y  anastomosis characterized by ulceration with visible vessel versus adherent clot.  Clip placed for marking.  Treated with APC.  Hemostatic spray applied.  -PPI gtt x72 hours and then b.i.d. PPI for 8 weeks.  -Carafate q.i.d. for 1 month.  -we will arrange repeat EGD in 2 months to assess healing.  -Monitor H/H and transfuse as needed to Hgb 7  -Monitor stools for bleeding.suspect he will have melena for a wh iledue to significant bleeding.       Shellie Fair PA-C

## 2025-04-04 NOTE — PLAN OF CARE
Problem: Adult Inpatient Plan of Care  Goal: Plan of Care Review  Outcome: Progressing  Flowsheets (Taken 4/4/2025 0800)  Plan of Care Reviewed With: patient  Goal: Patient-Specific Goal (Individualized)  Outcome: Progressing  Goal: Absence of Hospital-Acquired Illness or Injury  Outcome: Progressing  Intervention: Identify and Manage Fall Risk  Flowsheets (Taken 4/4/2025 0800)  Safety Promotion/Fall Prevention:   assistive device/personal item within reach   nonskid shoes/socks when out of bed   medications reviewed   lighting adjusted  Intervention: Prevent Skin Injury  Flowsheets (Taken 4/4/2025 0800)  Body Position: position changed independently  Skin Protection: incontinence pads utilized  Device Skin Pressure Protection: absorbent pad utilized/changed  Intervention: Prevent and Manage VTE (Venous Thromboembolism) Risk  Flowsheets (Taken 4/4/2025 0800)  VTE Prevention/Management: ROM (active) performed  Intervention: Prevent Infection  Flowsheets (Taken 4/4/2025 0800)  Infection Prevention: rest/sleep promoted  Goal: Optimal Comfort and Wellbeing  Outcome: Progressing  Intervention: Monitor Pain and Promote Comfort  Flowsheets (Taken 4/4/2025 0800)  Pain Management Interventions:   care clustered   medication offered   position adjusted  Intervention: Provide Person-Centered Care  Flowsheets (Taken 4/4/2025 0800)  Trust Relationship/Rapport:   care explained   reassurance provided   choices provided   thoughts/feelings acknowledged   emotional support provided   empathic listening provided   questions answered   questions encouraged  Goal: Readiness for Transition of Care  Outcome: Progressing     Problem: Bariatric Environmental Safety  Goal: Safety Maintained with Care  Outcome: Progressing     Problem: Wound  Goal: Optimal Coping  Outcome: Progressing  Intervention: Support Patient and Family Response  Flowsheets (Taken 4/4/2025 0800)  Supportive Measures: active listening utilized  Goal: Optimal  Functional Ability  Outcome: Progressing  Intervention: Optimize Functional Ability  Flowsheets (Taken 4/4/2025 0800)  Activity Management: Up in chair - L3  Goal: Absence of Infection Signs and Symptoms  Outcome: Progressing  Goal: Improved Oral Intake  Outcome: Progressing  Goal: Optimal Pain Control and Function  Outcome: Progressing  Intervention: Prevent or Manage Pain  Flowsheets (Taken 4/4/2025 0800)  Sleep/Rest Enhancement:   awakenings minimized   noise level reduced  Pain Management Interventions:   care clustered   medication offered   position adjusted  Goal: Skin Health and Integrity  Outcome: Progressing  Intervention: Optimize Skin Protection  Flowsheets (Taken 4/4/2025 0800)  Skin Protection: incontinence pads utilized  Activity Management: Up in chair - L3  Head of Bed (HOB) Positioning: HOB at 30 degrees  Goal: Optimal Wound Healing  Outcome: Progressing  Intervention: Promote Wound Healing  Flowsheets (Taken 4/4/2025 0800)  Sleep/Rest Enhancement:   awakenings minimized   noise level reduced     Problem: Fall Injury Risk  Goal: Absence of Fall and Fall-Related Injury  Outcome: Progressing  Intervention: Identify and Manage Contributors  Flowsheets (Taken 4/4/2025 0800)  Self-Care Promotion: independence encouraged  Medication Review/Management: medications reviewed  Intervention: Promote Injury-Free Environment  Flowsheets (Taken 4/4/2025 0800)  Safety Promotion/Fall Prevention:   assistive device/personal item within reach   nonskid shoes/socks when out of bed   medications reviewed   lighting adjusted

## 2025-04-04 NOTE — NURSING
Notified Dr. Naylor regarding not having a telemetry monitor available for the patient and explained to her that we are actively trying to acquire one for the patient. No new orders.

## 2025-04-04 NOTE — PROGRESS NOTES
Inpatient Nutrition Evaluation    Admit Date: 4/2/2025   Total duration of encounter: 2 days   Patient Age: 55 y.o.    Nutrition Recommendation/Prescription     Advance diet as tolerated to goal: diabetic diet. Add Bariatric diet modifier  Honor food preferences   Boost Max TID (160 kcal and 30 gm protein per serving)  Continue MVI, resume home vitamin/mineral regimen  Bowel regimen PRN  Monitor PO intake, labs and weight    Nutrition Assessment     Chart Review    Reason Seen: malnutrition screening tool (MST)    Malnutrition Screening Tool Results   Have you recently lost weight without trying?: Yes: 34 lbs or more  Have you been eating poorly because of a decreased appetite?: No   MST Score: 4   Diagnosis:  Acute upper GI bleed 2/2 marginal ulcer at the anastomosis site of prior Hugo-en-y s/p APC  and hemostatic spray applied  Acute blood anemia requiring multiple prbc transfusion 2/2 above     Relevant Medical History: HTN, CAD, HLD, DM, gastric bypass surgery 12/2024    Scheduled Medications:  calcium-vitamin D3, 1 tablet, Daily  cyanocobalamin, 500 mcg, Daily  levothyroxine, 125 mcg, Before breakfast  linaCLOtide, 145 mcg, Before breakfast  multivitamin, 1 tablet, Daily  pravastatin, 40 mg, Daily  ranolazine, 1,000 mg, BID  sucralfate, 1 g, QID (AC & HS)  vitamin D, 1,000 Units, Daily    Continuous Infusions:  pantoprazole (PROTONIX) IV infusion, Last Rate: 8 mg/hr (04/04/25 0426)    PRN Medications:   Current Facility-Administered Medications:     0.9%  NaCl infusion (for blood administration), , Intravenous, Q24H PRN    0.9%  NaCl infusion (for blood administration), , Intravenous, Q24H PRN    0.9%  NaCl infusion (for blood administration), , Intravenous, Q24H PRN    0.9%  NaCl infusion (for blood administration), , Intravenous, Q24H PRN    acetaminophen, 650 mg, Oral, Q8H PRN    melatonin, 6 mg, Oral, Nightly PRN    morphine, 2 mg, Intravenous, Q4H PRN    morphine, 4 mg, Intravenous, Q4H PRN    ondansetron,  "4 mg, Intravenous, Q8H PRN    sodium chloride 0.9%, 10 mL, Intravenous, PRN    Recent Labs   Lab 04/02/25 2205 04/02/25  2206 04/03/25  0558 04/03/25 2036 04/04/25  0238 04/04/25  0242 04/04/25  0655     --  136  --   --  138  --    K 4.5  --  3.9  --   --  4.5  --    CALCIUM 8.2*  --  8.1*  --   --  7.8*  --    *  --  106  --   --  107  --    CO2 21*  --  23  --   --  25  --    BUN 27.1*  --  23.1  --   --  15.7  --    CREATININE 0.80  --  0.70*  --   --  0.80  --    EGFRNORACEVR >60  --  >60  --   --  >60  --    GLUCOSE 128*  --  118*  --   --  91  --    BILITOT 0.2  --  0.3  --   --  0.4  --    ALKPHOS 49  --  48  --   --  48  --    ALT 7  --  8  --   --  7  --    AST 7*  --  9*  --   --  12  --    ALBUMIN 3.1*  --  3.1*  --   --  2.9*  --    WBC  --  22.70* 24.34  24.34*  --   --   --  16.91*   HGB  --  5.4* 5.9* 7.3* 7.5*  --  7.5*   HCT  --  16.6* 18.4* 21.7* 21.8*  --  21.9*     Nutrition Orders:  Diet Bariatric Full Liquid  Dietary nutrition supplements TID; Boost Glucose Control Max 30G Protein Nutritional Drink - Vanilla    Appetite/Oral Intake: fair/50-75% of meals  Factors Affecting Nutritional Intake: constipation and decreased appetite  Food/Zoroastrian/Cultural Preferences:  no citrus, acidic, spicy or fried foods  Food Allergies: no known food allergies  Last Bowel Movement: 04/03/25  Wound(s):  none documented    Comments    4/4/25: Pt reports decreased appetite since gastric bypass surgery 12/5/2024. Pt with post-op associated weight loss. Per EMR weight history, 74# weight loss in 4 months. Pt drinks Premier Protein shakes at home, agreed to Boost Max inpatient. Denies n/v; +constipation. No muscle or fat depletion noted per NFPE.    Anthropometrics    Height: 5' 7" (170.2 cm), Height Method: Stated  Last Weight: (!) 142.9 kg (315 lb) (04/03/25 1515), Weight Method: Stated  BMI (Calculated): 49.3  BMI Classification: obese grade III (BMI >/=40)        Ideal Body Weight (IBW), Male: " 148 lb     % Ideal Body Weight, Male (lb): 212.84 %                 Usual Body Weight (UBW), kg: (!) 176.5 kg  % Usual Body Weight: 81.12     Usual Weight Provided By: EMR weight history    Wt Readings from Last 5 Encounters:   04/03/25 (!) 142.9 kg (315 lb)   02/10/25 (!) 157.3 kg (346 lb 12.8 oz)   01/13/25 (!) 166 kg (366 lb)   12/18/24 (!) 172.5 kg (380 lb 6.4 oz)   12/11/24 (!) 176.5 kg (389 lb 1.8 oz)     Weight Change(s) Since Admission:   Wt Readings from Last 1 Encounters:   04/03/25 1515 (!) 142.9 kg (315 lb)   04/02/25 2153 (!) 145.2 kg (320 lb)   Admit Weight: (!) 145.2 kg (320 lb) (04/02/25 2153), Weight Method: Stated    Patient Education     Not applicable.    Nutrition Goals & Monitoring     Dietitian will monitor: food and beverage intake, weight change, electrolyte/renal panel, glucose/endocrine profile, and gastrointestinal profile    Nutrition Risk/Follow-Up: low (follow-up in 5-7 days)  Patients assigned 'low nutrition risk' status do not qualify for a full nutritional assessment but will be monitored and re-evaluated in a 5-7 day time period. Please consult if re-evaluation needed sooner.

## 2025-04-04 NOTE — PROGRESS NOTES
[facility] Medicine Progress Note        Chief Complaint: Inpatient Follow-up for     HPI: see admit note     Interval Hx:   Seen and examined   S/p total 5 units PRBC transfusion   Hb 7.5 this am   Undergoing 1 more unit prbc transfusion now   Pt has hx of CAD   Wife reports still has some blood in stool  Gi following   Egd shows marginal ulcer at the site of anastomosis s/p APC   Vitals normal   Case was discussed with patient's nurse     Objective/physical exam:  General: In no acute distress, afebrile  Chest: Clear to auscultation bilaterally  Heart: RRR, +S1, S2, no appreciable murmur  Abdomen: Soft, nontender, BS +  MSK: Warm, no lower extremity edema, no clubbing or cyanosis  Neurologic: Alert and oriented x4, no focal neuro deficits     VITAL SIGNS: 24 HRS MIN & MAX LAST   Temp  Min: 98 °F (36.7 °C)  Max: 99 °F (37.2 °C) 98.9 °F (37.2 °C)   BP  Min: 110/65  Max: 135/75 112/71   Pulse  Min: 70  Max: 83  80   Resp  Min: 18  Max: 22 18   SpO2  Min: 97 %  Max: 100 % 99 %     I have reviewed the following labs:  Recent Labs   Lab 04/02/25 2206 04/03/25 0558 04/03/25 2036 04/04/25  0238 04/04/25  0655   WBC 22.70* 24.34  24.34*  --   --  16.91*   RBC 1.72* 1.90*  --   --  2.46*   HGB 5.4* 5.9* 7.3* 7.5* 7.5*   HCT 16.6* 18.4* 21.7* 21.8* 21.9*   MCV 96.5* 96.8*  --   --  89.0   MCH 31.4* 31.1*  --   --  30.5   MCHC 32.5* 32.1*  --   --  34.2   RDW 15.0 15.4  --   --  18.6*    283  --   --  233   MPV 10.2 10.1  --   --  9.7     Recent Labs   Lab 04/02/25 2205 04/03/25 0558 04/04/25  0242    136 138   K 4.5 3.9 4.5   * 106 107   CO2 21* 23 25   BUN 27.1* 23.1 15.7   CREATININE 0.80 0.70* 0.80   CALCIUM 8.2* 8.1* 7.8*   ALBUMIN 3.1* 3.1* 2.9*   ALKPHOS 49 48 48   ALT 7 8 7   AST 7* 9* 12   BILITOT 0.2 0.3 0.4     Microbiology Results (last 7 days)       Procedure Component Value Units Date/Time    Blood Culture #1 **CANNOT BE ORDERED STAT** [[id number]]   (Normal) Collected: 04/03/25 0551    Order Status: Completed Specimen: Blood from Arm, Right Updated: 04/04/25 0701     Blood Culture No Growth At 24 Hours    Blood Culture #1 **CANNOT BE ORDERED STAT** [[id number]]  (Normal) Collected: 04/03/25 0558    Order Status: Completed Specimen: Blood from Antecubital, Right Updated: 04/04/25 0701     Blood Culture No Growth At 24 Hours             See below for Radiology    Assessment/Plan:  Acute upper GI bleed 2/2 marginal ulcer at the anastomosis site of prior Hugo-en-y s/p APC  and hemostatic spray applied  Acute blood anemia requiring multiple prbc transfusion 2/2 above   Hx of htn  Hx of CAD     Plan   Monitor h and h and transfuse as needed   Cont ppi bid x 8 weeks and carafate qid x 1 m per gi recs   Resume vitamins   Hold bp meds as bp normal  Resume if elevated   Holding antiplatelets for now   Labs in the am         All diagnosis and differential diagnosis have been reviewed; assessment and plan has been documented; I have personally reviewed the labs and test results that are presently available; I have reviewed the patients medication list; I have reviewed the consulting providers response and recommendations. I have reviewed or attempted to review medical records based upon their availability    All of the patient's questions have been  addressed and answered. Patient's is agreeable to the above stated plan. I will continue to monitor closely and make adjustments to medical management as needed.    Portions of this note dictated using EMR integrated voice recognition software, and may be subject to voice recognition errors not corrected at proofreading. Please contact writer for clarification if needed.   _____________________________________________________________________    Malnutrition Status:  Nutrition consulted. Most recent weight and BMI monitored-     Measurements:  Wt Readings from Last 1 Encounters:   04/03/25 (!) 142.9 kg (315 lb)   Body mass  index is 49.34 kg/m².    Patient has been screened and assessed by RD.    Malnutrition Type:  Context:    Level:      Malnutrition Characteristic Summary:       Interventions/Recommendations (treatment strategy):        Scheduled Med:   calcium-vitamin D3  1 tablet Oral Daily    cyanocobalamin  500 mcg Oral Daily    levothyroxine  125 mcg Oral Before breakfast    linaCLOtide  145 mcg Oral Before breakfast    multivitamin  1 tablet Oral Daily    pravastatin  40 mg Oral Daily    ranolazine  1,000 mg Oral BID    sucralfate  1 g Oral QID (AC & HS)    vitamin D  1,000 Units Oral Daily      Continuous Infusions:   pantoprazole (PROTONIX) IV infusion  8 mg/hr Intravenous Continuous 20 mL/hr at 04/04/25 0426 8 mg/hr at 04/04/25 0426      PRN Meds:    Current Facility-Administered Medications:     0.9%  NaCl infusion (for blood administration), , Intravenous, Q24H PRN    0.9%  NaCl infusion (for blood administration), , Intravenous, Q24H PRN    0.9%  NaCl infusion (for blood administration), , Intravenous, Q24H PRN    0.9%  NaCl infusion (for blood administration), , Intravenous, Q24H PRN    acetaminophen, 650 mg, Oral, Q8H PRN    melatonin, 6 mg, Oral, Nightly PRN    morphine, 2 mg, Intravenous, Q4H PRN    morphine, 4 mg, Intravenous, Q4H PRN    ondansetron, 4 mg, Intravenous, Q8H PRN    sodium chloride 0.9%, 10 mL, Intravenous, PRN     Radiology:  I have personally reviewed the following imaging and agree with the radiologist.     X-Ray Chest AP  Narrative: EXAMINATION:  XR CHEST AP PORTABLE    CLINICAL HISTORY:  Chest Pain;, .    COMPARISON:  February 7, 2024    FINDINGS:  No alveolar consolidation, effusion, or pneumothorax is seen.   The thoracic aorta is normal  cardiac silhouette, central pulmonary vessels and mediastinum are normal in size and are grossly unremarkable.   visualized osseous structures are grossly unremarkable.  Impression: No acute chest disease is identified.    Electronically signed by: Ameya  Sopaty  Date:    04/03/2025  Time:    08:46  CT Abdomen Pelvis W Wo Contrast  EXAMINATION  CT ABDOMEN PELVIS W WO CONTRAST    CLINICAL HISTORY  GI bleed;    TECHNIQUE  Pre- and post-contrast helical-acquisition CT images were obtained and multiplanar reformats accomplished by a CT technologist at a separate workstation, pushed to PACS for physician review.    CONTRAST  *IV: Omnipaque 350, 100 mL  *Enteric: none    COMPARISON  None available at the time of initial interpretation.    FINDINGS  Images were reviewed in soft tissue, lung, and bone windows.    Exam quality: adequate for evaluation    Lines/tubes: none visualized    Cardiopulmonary: Visualized heart chambers are normal volume.  No acute or focal abnormality of the included lower lung zones.  No significant pericardial or pleural fluid.    Hepatobiliary/Pancreas: No acute or focal liver abnormality.  Gallbladder is unremarkable. No convincing obstructive biliary process.  There is no evidence of expansile pancreatic lesion, ductal dilatation, or peripancreatic inflammatory changes.    Spleen: No acute or focal abnormality.    Adrenals/: No suspicious adrenal or renal lesion. No radiodense urolithiasis or evidence of distal obstructive uropathy.  No focal bladder wall thickening or convincing intraluminal abnormality.  No suspicious findings of the included reproductive structures.    Esophagus/GI tract: The visualized lower esophagus is unremarkable.  There are surgical changes of gastric bypass.  No evidence of gastric outlet or small bowel obstruction. No acute inflammatory process or convincing focal lesion. The appendix is normal.  Moderate burden of retained fecal material is present throughout the course of the colon.  No intraluminal contrast density is appreciated to suggest active GI tract hemorrhage.    Peritoneal/Extraperitoneal Spaces: No free fluid or air, and no drainable collections.  Included vascular structures are without evidence of  acute or focal abnormality.  Minimal burden of calcified aortoiliac mural plaque is present.  No pathologic gary enlargement or necrotic process.    Musculoskeletal: No acute process or suspicious focal abnormality.  There are degenerative changes of the visualized spinal column and bony pelvis.    IMPRESSION  1. No convincing acute abdominopelvic process.  Specifically, no CT evidence of active GI tract hemorrhage.  2. Incidental findings suggestive of constipation.  3. Additional chronic secondary details discussed above.    RADIATION DOSE  Automated tube current modulation, weight-based exposure dosing, and/or iterative reconstruction technique utilized to reach lowest reasonably achievable exposure rate.    DLP: 4718 mGy*cm    Electronically signed by: Josiah Lindsey  Date:    04/03/2025  Time:    08:43      Elizabeth Naylor MD  Department of Hospital Medicine   [facility]   04/04/2025

## 2025-04-05 LAB
BASOPHILS # BLD AUTO: 0.08 X10(3)/MCL
BASOPHILS NFR BLD AUTO: 0.6 %
EOSINOPHIL # BLD AUTO: 0.19 X10(3)/MCL (ref 0–0.9)
EOSINOPHIL NFR BLD AUTO: 1.3 %
ERYTHROCYTE [DISTWIDTH] IN BLOOD BY AUTOMATED COUNT: 19.5 % (ref 11.5–17)
HCT VFR BLD AUTO: 24.4 % (ref 42–52)
HGB BLD-MCNC: 8 G/DL (ref 14–18)
IMM GRANULOCYTES # BLD AUTO: 0.31 X10(3)/MCL (ref 0–0.04)
IMM GRANULOCYTES NFR BLD AUTO: 2.1 %
LYMPHOCYTES # BLD AUTO: 2.55 X10(3)/MCL (ref 0.6–4.6)
LYMPHOCYTES NFR BLD AUTO: 17.5 %
MCH RBC QN AUTO: 30.2 PG (ref 27–31)
MCHC RBC AUTO-ENTMCNC: 32.8 G/DL (ref 33–36)
MCV RBC AUTO: 92.1 FL (ref 80–94)
MONOCYTES # BLD AUTO: 1.37 X10(3)/MCL (ref 0.1–1.3)
MONOCYTES NFR BLD AUTO: 9.4 %
NEUTROPHILS # BLD AUTO: 10.03 X10(3)/MCL (ref 2.1–9.2)
NEUTROPHILS NFR BLD AUTO: 69.1 %
NRBC BLD AUTO-RTO: 1.2 %
PLATELET # BLD AUTO: 245 X10(3)/MCL (ref 130–400)
PMV BLD AUTO: 9.6 FL (ref 7.4–10.4)
RBC # BLD AUTO: 2.65 X10(6)/MCL (ref 4.7–6.1)
WBC # BLD AUTO: 14.53 X10(3)/MCL (ref 4.5–11.5)

## 2025-04-05 PROCEDURE — 36415 COLL VENOUS BLD VENIPUNCTURE: CPT | Performed by: PHYSICIAN ASSISTANT

## 2025-04-05 PROCEDURE — 25000003 PHARM REV CODE 250: Performed by: INTERNAL MEDICINE

## 2025-04-05 PROCEDURE — 21400001 HC TELEMETRY ROOM

## 2025-04-05 PROCEDURE — 85025 COMPLETE CBC W/AUTO DIFF WBC: CPT | Performed by: PHYSICIAN ASSISTANT

## 2025-04-05 PROCEDURE — 25000003 PHARM REV CODE 250: Performed by: PHYSICIAN ASSISTANT

## 2025-04-05 PROCEDURE — 63600175 PHARM REV CODE 636 W HCPCS: Performed by: INTERNAL MEDICINE

## 2025-04-05 RX ORDER — HYDROCHLOROTHIAZIDE 25 MG/1
25 TABLET ORAL ONCE
Status: COMPLETED | OUTPATIENT
Start: 2025-04-05 | End: 2025-04-05

## 2025-04-05 RX ADMIN — LINACLOTIDE 145 MCG: 145 CAPSULE, GELATIN COATED ORAL at 05:04

## 2025-04-05 RX ADMIN — SODIUM CHLORIDE 125 MG: 9 INJECTION, SOLUTION INTRAVENOUS at 06:04

## 2025-04-05 RX ADMIN — Medication 1 TABLET: at 09:04

## 2025-04-05 RX ADMIN — THERA TABS 1 TABLET: TAB at 09:04

## 2025-04-05 RX ADMIN — SUCRALFATE 1 G: 1 TABLET ORAL at 04:04

## 2025-04-05 RX ADMIN — CYANOCOBALAMIN TAB 500 MCG 500 MCG: 500 TAB at 09:04

## 2025-04-05 RX ADMIN — PANTOPRAZOLE SODIUM 8 MG/HR: 40 INJECTION, POWDER, FOR SOLUTION INTRAVENOUS at 12:04

## 2025-04-05 RX ADMIN — LEVOTHYROXINE SODIUM 125 MCG: 125 TABLET ORAL at 05:04

## 2025-04-05 RX ADMIN — PANTOPRAZOLE SODIUM 8 MG/HR: 40 INJECTION, POWDER, FOR SOLUTION INTRAVENOUS at 11:04

## 2025-04-05 RX ADMIN — PRAVASTATIN SODIUM 40 MG: 40 TABLET ORAL at 09:04

## 2025-04-05 RX ADMIN — PANTOPRAZOLE SODIUM 8 MG/HR: 40 INJECTION, POWDER, FOR SOLUTION INTRAVENOUS at 06:04

## 2025-04-05 RX ADMIN — HYDROCHLOROTHIAZIDE 25 MG: 25 TABLET ORAL at 11:04

## 2025-04-05 RX ADMIN — RANOLAZINE 1000 MG: 500 TABLET, EXTENDED RELEASE ORAL at 09:04

## 2025-04-05 RX ADMIN — Medication 6 MG: at 09:04

## 2025-04-05 RX ADMIN — SUCRALFATE 1 G: 1 TABLET ORAL at 09:04

## 2025-04-05 RX ADMIN — PANTOPRAZOLE SODIUM 8 MG/HR: 40 INJECTION, POWDER, FOR SOLUTION INTRAVENOUS at 05:04

## 2025-04-05 RX ADMIN — Medication 1000 UNITS: at 09:04

## 2025-04-05 RX ADMIN — SUCRALFATE 1 G: 1 TABLET ORAL at 11:04

## 2025-04-05 RX ADMIN — SUCRALFATE 1 G: 1 TABLET ORAL at 05:04

## 2025-04-05 NOTE — PROGRESS NOTES
[facility] Medicine Progress Note        Chief Complaint: Inpatient Follow-up for     HPI: see admit note     Interval Hx:   Seen and examined   Hb improved to 8   Gi following   Vitals normal   Antiplatelets on hold   Case was discussed with patient's nurse     Objective/physical exam:  General: In no acute distress, afebrile  Chest: Clear to auscultation bilaterally  Heart: RRR, +S1, S2, no appreciable murmur, trace ble edema   Abdomen: Soft, nontender, BS +  MSK: Warm, no lower extremity edema, no clubbing or cyanosis  Neurologic: Alert and oriented x4, no focal neuro deficits     VITAL SIGNS: 24 HRS MIN & MAX LAST   Temp  Min: 98.3 °F (36.8 °C)  Max: 98.9 °F (37.2 °C) 98.3 °F (36.8 °C)   BP  Min: 112/71  Max: 125/76 125/76   Pulse  Min: 66  Max: 80  70   No data recorded 18   SpO2  Min: 99 %  Max: 100 % 99 %     I have reviewed the following labs:  Recent Labs   Lab 04/03/25 0558 04/03/25 2036 04/04/25  0238 04/04/25  0655 04/05/25  0545   WBC 24.34  24.34*  --   --  16.91* 14.53*   RBC 1.90*  --   --  2.46* 2.65*   HGB 5.9*   < > 7.5* 7.5* 8.0*   HCT 18.4*   < > 21.8* 21.9* 24.4*   MCV 96.8*  --   --  89.0 92.1   MCH 31.1*  --   --  30.5 30.2   MCHC 32.1*  --   --  34.2 32.8*   RDW 15.4  --   --  18.6* 19.5*     --   --  233 245   MPV 10.1  --   --  9.7 9.6    < > = values in this interval not displayed.     Recent Labs   Lab 04/02/25 2205 04/03/25  0558 04/04/25  0242    136 138   K 4.5 3.9 4.5   * 106 107   CO2 21* 23 25   BUN 27.1* 23.1 15.7   CREATININE 0.80 0.70* 0.80   CALCIUM 8.2* 8.1* 7.8*   ALBUMIN 3.1* 3.1* 2.9*   ALKPHOS 49 48 48   ALT 7 8 7   AST 7* 9* 12   BILITOT 0.2 0.3 0.4     Microbiology Results (last 7 days)       Procedure Component Value Units Date/Time    Blood Culture #1 **CANNOT BE ORDERED STAT** [[id number]]  (Normal) Collected: 04/03/25 0551    Order Status: Completed Specimen: Blood from Arm, Right Updated: 04/05/25 0700      Blood Culture No Growth At 48 Hours    Blood Culture #1 **CANNOT BE ORDERED STAT** [[id number]]  (Normal) Collected: 04/03/25 0558    Order Status: Completed Specimen: Blood from Antecubital, Right Updated: 04/05/25 0700     Blood Culture No Growth At 48 Hours             See below for Radiology    Assessment/Plan:  Acute upper GI bleed 2/2 marginal ulcer at the anastomosis site of prior Hugo-en-y s/p APC  and hemostatic spray applied  Acute blood anemia requiring multiple prbc transfusion 2/2 above   Hx of htn  Hx of CAD     Plan   S/p 6 units prbc transfusion so far   Hb improved to 8   Monitor h and h and transfuse as needed   Cont ppi bid x 8 weeks and carafate qid x 1 m per gi recs   Resume vitamins   Hold bp meds as bp normal  Give one dose hctz for trace edema   Holding antiplatelets for now   Pci w stent placement 2 yrs ago   Possible dc home tomorrow if h and h remains stable         All diagnosis and differential diagnosis have been reviewed; assessment and plan has been documented; I have personally reviewed the labs and test results that are presently available; I have reviewed the patients medication list; I have reviewed the consulting providers response and recommendations. I have reviewed or attempted to review medical records based upon their availability    All of the patient's questions have been  addressed and answered. Patient's is agreeable to the above stated plan. I will continue to monitor closely and make adjustments to medical management as needed.    Portions of this note dictated using EMR integrated voice recognition software, and may be subject to voice recognition errors not corrected at proofreading. Please contact writer for clarification if needed.   _____________________________________________________________________    Malnutrition Status:  Nutrition consulted. Most recent weight and BMI monitored-     Measurements:  Wt Readings from Last 1 Encounters:   04/03/25 (!) 142.9  kg (315 lb)   Body mass index is 49.34 kg/m².    Patient has been screened and assessed by RD.    Malnutrition Type:  Context:    Level:      Malnutrition Characteristic Summary:       Interventions/Recommendations (treatment strategy):        Scheduled Med:   calcium-vitamin D3  1 tablet Oral Daily    cyanocobalamin  500 mcg Oral Daily    levothyroxine  125 mcg Oral Before breakfast    linaCLOtide  145 mcg Oral Before breakfast    multivitamin  1 tablet Oral Daily    pravastatin  40 mg Oral Daily    ranolazine  1,000 mg Oral BID    sucralfate  1 g Oral QID (AC & HS)    vitamin D  1,000 Units Oral Daily      Continuous Infusions:   pantoprazole (PROTONIX) IV infusion  8 mg/hr Intravenous Continuous 20 mL/hr at 04/05/25 0559 8 mg/hr at 04/05/25 0559      PRN Meds:    Current Facility-Administered Medications:     0.9%  NaCl infusion (for blood administration), , Intravenous, Q24H PRN    0.9%  NaCl infusion (for blood administration), , Intravenous, Q24H PRN    0.9%  NaCl infusion (for blood administration), , Intravenous, Q24H PRN    0.9%  NaCl infusion (for blood administration), , Intravenous, Q24H PRN    acetaminophen, 650 mg, Oral, Q8H PRN    melatonin, 6 mg, Oral, Nightly PRN    morphine, 2 mg, Intravenous, Q4H PRN    morphine, 4 mg, Intravenous, Q4H PRN    ondansetron, 4 mg, Intravenous, Q8H PRN    sodium chloride 0.9%, 10 mL, Intravenous, PRN     Radiology:  I have personally reviewed the following imaging and agree with the radiologist.     X-Ray Chest AP  Narrative: EXAMINATION:  XR CHEST AP PORTABLE    CLINICAL HISTORY:  Chest Pain;, .    COMPARISON:  February 7, 2024    FINDINGS:  No alveolar consolidation, effusion, or pneumothorax is seen.   The thoracic aorta is normal  cardiac silhouette, central pulmonary vessels and mediastinum are normal in size and are grossly unremarkable.   visualized osseous structures are grossly unremarkable.  Impression: No acute chest disease is identified.    Electronically  signed by: Ameya Ferreira  Date:    04/03/2025  Time:    08:46  CT Abdomen Pelvis W Wo Contrast  EXAMINATION  CT ABDOMEN PELVIS W WO CONTRAST    CLINICAL HISTORY  GI bleed;    TECHNIQUE  Pre- and post-contrast helical-acquisition CT images were obtained and multiplanar reformats accomplished by a CT technologist at a separate workstation, pushed to PACS for physician review.    CONTRAST  *IV: Omnipaque 350, 100 mL  *Enteric: none    COMPARISON  None available at the time of initial interpretation.    FINDINGS  Images were reviewed in soft tissue, lung, and bone windows.    Exam quality: adequate for evaluation    Lines/tubes: none visualized    Cardiopulmonary: Visualized heart chambers are normal volume.  No acute or focal abnormality of the included lower lung zones.  No significant pericardial or pleural fluid.    Hepatobiliary/Pancreas: No acute or focal liver abnormality.  Gallbladder is unremarkable. No convincing obstructive biliary process.  There is no evidence of expansile pancreatic lesion, ductal dilatation, or peripancreatic inflammatory changes.    Spleen: No acute or focal abnormality.    Adrenals/: No suspicious adrenal or renal lesion. No radiodense urolithiasis or evidence of distal obstructive uropathy.  No focal bladder wall thickening or convincing intraluminal abnormality.  No suspicious findings of the included reproductive structures.    Esophagus/GI tract: The visualized lower esophagus is unremarkable.  There are surgical changes of gastric bypass.  No evidence of gastric outlet or small bowel obstruction. No acute inflammatory process or convincing focal lesion. The appendix is normal.  Moderate burden of retained fecal material is present throughout the course of the colon.  No intraluminal contrast density is appreciated to suggest active GI tract hemorrhage.    Peritoneal/Extraperitoneal Spaces: No free fluid or air, and no drainable collections.  Included vascular structures are  without evidence of acute or focal abnormality.  Minimal burden of calcified aortoiliac mural plaque is present.  No pathologic gary enlargement or necrotic process.    Musculoskeletal: No acute process or suspicious focal abnormality.  There are degenerative changes of the visualized spinal column and bony pelvis.    IMPRESSION  1. No convincing acute abdominopelvic process.  Specifically, no CT evidence of active GI tract hemorrhage.  2. Incidental findings suggestive of constipation.  3. Additional chronic secondary details discussed above.    RADIATION DOSE  Automated tube current modulation, weight-based exposure dosing, and/or iterative reconstruction technique utilized to reach lowest reasonably achievable exposure rate.    DLP: 4718 mGy*cm    Electronically signed by: Josiah Lindsey  Date:    04/03/2025  Time:    08:43      Elizabeth Naylor MD  Department of Hospital Medicine   [facility]   04/05/2025

## 2025-04-06 VITALS
HEIGHT: 67 IN | HEART RATE: 71 BPM | SYSTOLIC BLOOD PRESSURE: 111 MMHG | DIASTOLIC BLOOD PRESSURE: 68 MMHG | RESPIRATION RATE: 20 BRPM | OXYGEN SATURATION: 98 % | BODY MASS INDEX: 49.44 KG/M2 | WEIGHT: 315 LBS | TEMPERATURE: 98 F

## 2025-04-06 LAB
ANION GAP SERPL CALC-SCNC: 9 MEQ/L
BASOPHILS # BLD AUTO: 0.07 X10(3)/MCL
BASOPHILS NFR BLD AUTO: 0.5 %
BUN SERPL-MCNC: 13.6 MG/DL (ref 8.4–25.7)
CALCIUM SERPL-MCNC: 8.2 MG/DL (ref 8.4–10.2)
CHLORIDE SERPL-SCNC: 108 MMOL/L (ref 98–107)
CO2 SERPL-SCNC: 21 MMOL/L (ref 22–29)
CREAT SERPL-MCNC: 0.78 MG/DL (ref 0.72–1.25)
CREAT/UREA NIT SERPL: 17
EOSINOPHIL # BLD AUTO: 0.23 X10(3)/MCL (ref 0–0.9)
EOSINOPHIL NFR BLD AUTO: 1.7 %
ERYTHROCYTE [DISTWIDTH] IN BLOOD BY AUTOMATED COUNT: 19.9 % (ref 11.5–17)
GFR SERPLBLD CREATININE-BSD FMLA CKD-EPI: >60 ML/MIN/1.73/M2
GLUCOSE SERPL-MCNC: 75 MG/DL (ref 74–100)
HCT VFR BLD AUTO: 25.1 % (ref 42–52)
HGB BLD-MCNC: 8.3 G/DL (ref 14–18)
IMM GRANULOCYTES # BLD AUTO: 0.16 X10(3)/MCL (ref 0–0.04)
IMM GRANULOCYTES NFR BLD AUTO: 1.2 %
LYMPHOCYTES # BLD AUTO: 2.03 X10(3)/MCL (ref 0.6–4.6)
LYMPHOCYTES NFR BLD AUTO: 15 %
MCH RBC QN AUTO: 30.1 PG (ref 27–31)
MCHC RBC AUTO-ENTMCNC: 33.1 G/DL (ref 33–36)
MCV RBC AUTO: 90.9 FL (ref 80–94)
MONOCYTES # BLD AUTO: 1.24 X10(3)/MCL (ref 0.1–1.3)
MONOCYTES NFR BLD AUTO: 9.2 %
NEUTROPHILS # BLD AUTO: 9.77 X10(3)/MCL (ref 2.1–9.2)
NEUTROPHILS NFR BLD AUTO: 72.4 %
NRBC BLD AUTO-RTO: 0.7 %
PLATELET # BLD AUTO: 268 X10(3)/MCL (ref 130–400)
PMV BLD AUTO: 9.5 FL (ref 7.4–10.4)
POTASSIUM SERPL-SCNC: 4.3 MMOL/L (ref 3.5–5.1)
RBC # BLD AUTO: 2.76 X10(6)/MCL (ref 4.7–6.1)
SODIUM SERPL-SCNC: 138 MMOL/L (ref 136–145)
WBC # BLD AUTO: 13.5 X10(3)/MCL (ref 4.5–11.5)

## 2025-04-06 PROCEDURE — 25000003 PHARM REV CODE 250: Performed by: INTERNAL MEDICINE

## 2025-04-06 PROCEDURE — 85025 COMPLETE CBC W/AUTO DIFF WBC: CPT | Performed by: INTERNAL MEDICINE

## 2025-04-06 PROCEDURE — 36415 COLL VENOUS BLD VENIPUNCTURE: CPT | Performed by: INTERNAL MEDICINE

## 2025-04-06 PROCEDURE — 80048 BASIC METABOLIC PNL TOTAL CA: CPT | Performed by: INTERNAL MEDICINE

## 2025-04-06 PROCEDURE — 63600175 PHARM REV CODE 636 W HCPCS: Performed by: INTERNAL MEDICINE

## 2025-04-06 PROCEDURE — 25000003 PHARM REV CODE 250: Performed by: PHYSICIAN ASSISTANT

## 2025-04-06 RX ORDER — PANTOPRAZOLE SODIUM 40 MG/1
40 TABLET, DELAYED RELEASE ORAL 2 TIMES DAILY
Qty: 120 TABLET | Refills: 0 | Status: SHIPPED | OUTPATIENT
Start: 2025-04-06 | End: 2025-06-05

## 2025-04-06 RX ORDER — SUCRALFATE 1 G/1
1 TABLET ORAL
Qty: 56 TABLET | Refills: 0 | Status: SHIPPED | OUTPATIENT
Start: 2025-04-06 | End: 2025-04-20

## 2025-04-06 RX ADMIN — SUCRALFATE 1 G: 1 TABLET ORAL at 06:04

## 2025-04-06 RX ADMIN — Medication 1 TABLET: at 09:04

## 2025-04-06 RX ADMIN — LINACLOTIDE 145 MCG: 145 CAPSULE, GELATIN COATED ORAL at 06:04

## 2025-04-06 RX ADMIN — LEVOTHYROXINE SODIUM 125 MCG: 125 TABLET ORAL at 06:04

## 2025-04-06 RX ADMIN — PRAVASTATIN SODIUM 40 MG: 40 TABLET ORAL at 09:04

## 2025-04-06 RX ADMIN — THERA TABS 1 TABLET: TAB at 09:04

## 2025-04-06 RX ADMIN — Medication 1000 UNITS: at 09:04

## 2025-04-06 RX ADMIN — RANOLAZINE 1000 MG: 500 TABLET, EXTENDED RELEASE ORAL at 09:04

## 2025-04-06 RX ADMIN — PANTOPRAZOLE SODIUM 8 MG/HR: 40 INJECTION, POWDER, FOR SOLUTION INTRAVENOUS at 05:04

## 2025-04-06 RX ADMIN — CYANOCOBALAMIN TAB 500 MCG 500 MCG: 500 TAB at 09:04

## 2025-04-06 NOTE — DISCHARGE SUMMARY
[facility] Hospital Medicine Discharge Summary    Admit Date: 4/2/2025  Discharge Date and Time: 4/6/20258:43 AM  Admitting Physician:  Team  Discharging Physician: Elizabeth Naylor MD.  Primary Care Physician: Alex Calvillo Sr., MD  Consults: {consultation: [id number]}    Discharge Diagnoses:  ***    Hospital Course:   ***  Pt was seen and examined on the day of discharge  Vitals:  VITAL SIGNS: 24 HRS MIN & MAX LAST   Temp  Min: 97.3 °F (36.3 °C)  Max: 98.4 °F (36.9 °C) 98 °F (36.7 °C)   BP  Min: 111/68  Max: 136/80 111/68   Pulse  Min: 59  Max: 71  71   Resp  Min: 16  Max: 20 20   SpO2  Min: 98 %  Max: 100 % 98 %       Physical Exam:  ***    Procedures Performed: No admission procedures for hospital encounter.     Significant Diagnostic Studies: See Full reports for all details    Recent Labs   Lab 04/04/25  0655 04/05/25  0545 04/06/25  0329   WBC 16.91* 14.53* 13.50*   RBC 2.46* 2.65* 2.76*   HGB 7.5* 8.0* 8.3*   HCT 21.9* 24.4* 25.1*   MCV 89.0 92.1 90.9   MCH 30.5 30.2 30.1   MCHC 34.2 32.8* 33.1   RDW 18.6* 19.5* 19.9*    245 268   MPV 9.7 9.6 9.5       Recent Labs   Lab 04/02/25  2205 04/03/25  0558 04/04/25  0242 04/06/25  0329    136 138 138   K 4.5 3.9 4.5 4.3   * 106 107 108*   CO2 21* 23 25 21*   BUN 27.1* 23.1 15.7 13.6   CREATININE 0.80 0.70* 0.80 0.78   CALCIUM 8.2* 8.1* 7.8* 8.2*   ALBUMIN 3.1* 3.1* 2.9*  --    ALKPHOS 49 48 48  --    ALT 7 8 7  --    AST 7* 9* 12  --    BILITOT 0.2 0.3 0.4  --         Microbiology Results (last 7 days)       Procedure Component Value Units Date/Time    Blood Culture #1 **CANNOT BE ORDERED STAT** [[id number]]  (Normal) Collected: 04/03/25 0551    Order Status: Completed Specimen: Blood from Arm, Right Updated: 04/06/25 0700     Blood Culture No Growth At 72 Hours    Blood Culture #1 **CANNOT BE ORDERED STAT** [[id number]]  (Normal) Collected: 04/03/25 0558    Order Status: Completed Specimen: Blood from  Antecubital, Right Updated: 04/06/25 0700     Blood Culture No Growth At 72 Hours             X-Ray Chest AP  Narrative: EXAMINATION:  XR CHEST AP PORTABLE    CLINICAL HISTORY:  Chest Pain;, .    COMPARISON:  February 7, 2024    FINDINGS:  No alveolar consolidation, effusion, or pneumothorax is seen.   The thoracic aorta is normal  cardiac silhouette, central pulmonary vessels and mediastinum are normal in size and are grossly unremarkable.   visualized osseous structures are grossly unremarkable.  Impression: No acute chest disease is identified.    Electronically signed by: Ameya Ferreira  Date:    04/03/2025  Time:    08:46  CT Abdomen Pelvis W Wo Contrast  EXAMINATION  CT ABDOMEN PELVIS W WO CONTRAST    CLINICAL HISTORY  GI bleed;    TECHNIQUE  Pre- and post-contrast helical-acquisition CT images were obtained and multiplanar reformats accomplished by a CT technologist at a separate workstation, pushed to PACS for physician review.    CONTRAST  *IV: Omnipaque 350, 100 mL  *Enteric: none    COMPARISON  None available at the time of initial interpretation.    FINDINGS  Images were reviewed in soft tissue, lung, and bone windows.    Exam quality: adequate for evaluation    Lines/tubes: none visualized    Cardiopulmonary: Visualized heart chambers are normal volume.  No acute or focal abnormality of the included lower lung zones.  No significant pericardial or pleural fluid.    Hepatobiliary/Pancreas: No acute or focal liver abnormality.  Gallbladder is unremarkable. No convincing obstructive biliary process.  There is no evidence of expansile pancreatic lesion, ductal dilatation, or peripancreatic inflammatory changes.    Spleen: No acute or focal abnormality.    Adrenals/: No suspicious adrenal or renal lesion. No radiodense urolithiasis or evidence of distal obstructive uropathy.  No focal bladder wall thickening or convincing intraluminal abnormality.  No suspicious findings of the included reproductive  structures.    Esophagus/GI tract: The visualized lower esophagus is unremarkable.  There are surgical changes of gastric bypass.  No evidence of gastric outlet or small bowel obstruction. No acute inflammatory process or convincing focal lesion. The appendix is normal.  Moderate burden of retained fecal material is present throughout the course of the colon.  No intraluminal contrast density is appreciated to suggest active GI tract hemorrhage.    Peritoneal/Extraperitoneal Spaces: No free fluid or air, and no drainable collections.  Included vascular structures are without evidence of acute or focal abnormality.  Minimal burden of calcified aortoiliac mural plaque is present.  No pathologic gary enlargement or necrotic process.    Musculoskeletal: No acute process or suspicious focal abnormality.  There are degenerative changes of the visualized spinal column and bony pelvis.    IMPRESSION  1. No convincing acute abdominopelvic process.  Specifically, no CT evidence of active GI tract hemorrhage.  2. Incidental findings suggestive of constipation.  3. Additional chronic secondary details discussed above.    RADIATION DOSE  Automated tube current modulation, weight-based exposure dosing, and/or iterative reconstruction technique utilized to reach lowest reasonably achievable exposure rate.    DLP: 4718 mGy*cm    Electronically signed by: Josiah Lindsey  Date:    04/03/2025  Time:    08:43         Medication List        START taking these medications      linaCLOtide 145 mcg Cap capsule  Commonly known as: LINZESS  Take 1 capsule (145 mcg total) by mouth before breakfast.  Start taking on: April 7, 2025     pantoprazole 40 MG tablet  Commonly known as: PROTONIX  Take 1 tablet (40 mg total) by mouth 2 (two) times daily.     sucralfate 1 gram tablet  Commonly known as: CARAFATE  Take 1 tablet (1 g total) by mouth 4 (four) times daily before meals and nightly. for 14 days            CONTINUE taking these medications       calcium citrate 200 mg (950 mg) tablet  Commonly known as: CALCITRATE     cholecalciferol (vitamin D3) 50 mcg (2,000 unit) Cap capsule  Commonly known as: VITAMIN D3     cyanocobalamin 250 MCG tablet  Commonly known as: VITAMIN B-12     ferrous sulfate Tab tablet  Commonly known as: FEOSOL     levothyroxine 125 MCG tablet  Commonly known as: SYNTHROID  Take 1 tablet (125 mcg total) by mouth before breakfast.     ONE DAILY MULTIVITAMIN per tablet  Generic drug: multivitamin     pravastatin 40 MG tablet  Commonly known as: PRAVACHOL     psyllium powder  Commonly known as: METAMUCIL     ranolazine 1,000 mg Tb12  Commonly known as: RANEXA     ursodioL 500 MG tablet  Commonly known as: HARRIS FORTE  Take 1 tablet (500 mg total) by mouth once daily. For gallstone prevention.            STOP taking these medications      amLODIPine-benazepriL 5-40 mg per capsule  Commonly known as: LOTREL     aspirin 81 MG EC tablet  Commonly known as: ECOTRIN     clopidogreL 75 mg tablet  Commonly known as: PLAVIX     hydroCHLOROthiazide 25 MG tablet  Commonly known as: HYDRODIURIL               Where to Get Your Medications        These medications were sent to [facility] [address] [place] [address]      Phone: [phone]   linaCLOtide 145 mcg Cap capsule  pantoprazole 40 MG tablet  sucralfate 1 gram tablet          Explained in detail to the patient about the discharge plan, medications, and follow-up visits. Pt understands and agrees with the treatment plan  Discharge Disposition: Home or Self Care   Discharged Condition: stable  Diet-   Dietary Orders (From admission, onward)       Start     Ordered    04/04/25 1207  Diet Bariatric Full Liquid  Diet effective now         04/04/25 1206    04/04/25 1206  Dietary nutrition supplements TID; Boost Glucose Control Max 30G Protein Nutritional Drink - Vanilla  Continuous        Question Answer Comment   Frequency: TID     Select PO Supplement: Boost Glucose Control Max 30G Protein Nutritional Drink - Vanilla        04/04/25 1205                   Medications Per DC med rec  Activities as tolerated   Follow-up Information       Alex Calvillo Sr., MD Follow up in 1 week(s).    Specialty: Family Medicine  Contact information:  [address] [place] [address] [place] [address]  [phone]                           For further questions contact hospitalist office    Discharge time 33 minutes    For worsening symptoms, chest pain, shortness of breath, increased abdominal pain, high grade fever, stroke or stroke like symptoms, immediately go to the nearest Emergency Room or call 911 as soon as possible.      Elizabeth Mccarthy M.D, on 4/6/2025. at 8:43 AM.

## 2025-04-07 ENCOUNTER — PATIENT OUTREACH (OUTPATIENT)
Dept: ADMINISTRATIVE | Facility: CLINIC | Age: 55
End: 2025-04-07
Payer: COMMERCIAL

## 2025-04-07 NOTE — PROGRESS NOTES
C3 nurse spoke with Hayden Puckett for a TCC post hospital discharge follow up call. The patient has a scheduled HOS appointment with Alex Calvillo Sr., MD on 4/10/25 @ 9:40.

## 2025-04-08 LAB
BACTERIA BLD CULT: NORMAL
BACTERIA BLD CULT: NORMAL

## 2025-04-10 ENCOUNTER — OFFICE VISIT (OUTPATIENT)
Dept: FAMILY MEDICINE | Facility: CLINIC | Age: 55
End: 2025-04-10
Payer: COMMERCIAL

## 2025-04-10 VITALS
TEMPERATURE: 99 F | OXYGEN SATURATION: 100 % | RESPIRATION RATE: 18 BRPM | WEIGHT: 315 LBS | SYSTOLIC BLOOD PRESSURE: 134 MMHG | DIASTOLIC BLOOD PRESSURE: 81 MMHG | HEART RATE: 71 BPM | HEIGHT: 67 IN | BODY MASS INDEX: 49.44 KG/M2

## 2025-04-10 DIAGNOSIS — D62 ACUTE BLOOD LOSS ANEMIA: Primary | ICD-10-CM

## 2025-04-10 NOTE — LETTER
April 10, 2025      [address]  [place] [address]  Phone: [phone]  Fax: [phone]       Patient: Hayden Puckett   YOB: 1970  Date of Visit: 04/10/2025    To Whom It May Concern:    Jessica Puckett  was at [facility] on 04/10/2025. The patient may return to work on 4/10/25 with no restrictions. If you have any questions or concerns, or if I can be of further assistance, please do not hesitate to contact me.    Sincerely,    Lindy Flower MD

## 2025-04-10 NOTE — ASSESSMENT & PLAN NOTE
Patient was admitted from 4/3/25-4/6/25 at [facility] for upper GI bleed from a marginal ulcer at the anastomosis site of prior Hugo-en-Y gastric bypass seen on EGD.  He was transfused 6 units of PRBC use.  Hemoglobin improved from 5.9 on admission to 8.3 on the day of discharge.    GI recommended PPI b.i.d. for 8 weeks and Carafate q.i.d. for 1 month.  He was discharged on ferrous sulfate daily.  Symptoms resolved.  We will recheck CBC and CMP in 1 month.

## 2025-04-10 NOTE — PROGRESS NOTES
[facility]  Lindy Flower MD    Patient ID: [id number]     Chief Complaint: No chief complaint on file.      HPI:     Hayden Puckett is a 55 y.o. male with hypertension, CAD, HLD, diabetes, gastric bypass 12/24 here today for hospital follow up.    Patient was having dark stools and episodes of syncope. Patient was admitted from 4/3/25-4/6/25 at [facility] for upper GI bleed from a marginal ulcer at the anastomosis site of prior Hugo-en-Y gastric bypass seen on EGD.  Hemoglobin of 5.9 on admission.  There is no discharge summary available.  It appears he was transfused at least 6 units PRBCs.  Hemoglobin on the day of discharge was 8.3.  GI recommended PPI b.i.d. for 8 weeks and Carafate q.i.d. for 1 month.  He was also discharged on ferrous sulfate p.o. daily    He has no more black or bloody stools. No epigastric pain. He feels a lot better.  Requesting return to work paperwork.    Past Medical History:   Diagnosis Date    Coronary artery disease     Diabetes mellitus, type 2     Hyperlipidemia     Hypertension     Hypogonadism in male     Hypothyroidism associated with surgical procedure     Insulin resistance     Obesity,     QUEENIE (obstructive sleep apnea)     Sleep apnea     Vitamin D deficiency         Past Surgical History:   Procedure Laterality Date    COLONOSCOPY N/A 11/23/2022    Procedure: COLON;  Surgeon: Drake Farley MD;  Location: [facility] ENDOSCOPY;  Service: Gastroenterology;  Laterality: N/A;    ESOPHAGOGASTRODUODENOSCOPY N/A 4/3/2025    Procedure: EGD;  Surgeon: Faraz Alfred MD;  Location: [facility] ENDOSCOPY;  Service: Endoscopy;  Laterality: N/A;  Bebopoward, clip x 1 , APC of Anastomotic site    LAPAROSCOPIC GASTROENTEROSTOMY N/A 12/5/2024    Procedure: GASTROENTEROSTOMY, LAPAROSCOPIC W/ INTRA OP EGD;  Surgeon: Darien Bai Jr., MD;  Location: [facility] OR [address] [place] [address];  Service: General;  Laterality: N/A;  VASYL PT  WAL#  [id number]   BMI 66.33    [facility] NOTIFIED anesthesia to complete regional block    LEFT HEART CATHETERIZATION  2022    MANDIBLE FRACTURE SURGERY      THYROID SURGERY      TONSILLECTOMY          Social History     Tobacco Use    Smoking status: Never    Smokeless tobacco: Never   Substance and Sexual Activity    Alcohol use: Not Currently     Comment: A few times in a year    Drug use: Never    Sexual activity: Not on file        Current Outpatient Medications   Medication Instructions    calcium citrate (CALCITRATE) 200 mg (950 mg) tablet 1 tablet, Daily    cholecalciferol, vitamin D3, (VITAMIN D3) 50 mcg (2,000 unit) Cap capsule Daily    cyanocobalamin (VITAMIN B-12) 250 mcg, Daily    ferrous sulfate (FEOSOL) Tab tablet 1 tablet, With breakfast    levothyroxine (SYNTHROID) 125 mcg, Oral, Before breakfast    linaCLOtide (LINZESS) 145 mcg, Oral, Before breakfast    multivitamin (ONE DAILY MULTIVITAMIN) per tablet 1 tablet, Daily    pantoprazole (PROTONIX) 40 mg, Oral, 2 times daily    pravastatin (PRAVACHOL) 40 mg, Daily    psyllium (METAMUCIL) powder 1 packet, 3 times daily    ranolazine (RANEXA) 1,000 mg, 2 times daily    sucralfate (CARAFATE) 1 g, Oral, Before meals & nightly    ursodioL (HARRIS FORTE) 500 mg, Oral, Daily, For gallstone prevention.       Review of patient's allergies indicates:  No Known Allergies     Patient Care Team:  Alex Calvillo Sr., MD as PCP - General (Family Medicine)  Eliud Lepe MD as Consulting Physician (Cardiology)  Dave Bustillo OD (Optometry)  Jhony Vaz III, MD as Consulting Physician (Otolaryngology)  To Vázquez NP (Pulmonary Disease)  Amor Eaton MD as Consulting Physician (Orthopedic Surgery)  DR. keisha Kay (Gastroenterology)  [facility]  , [facility] as Telemedicine Site (Emergency Medicine)     Subjective:     Review of Systems    12 point review of systems conducted, negative except as stated in  "the history of present illness. See HPI for details.    Objective:     Visit Vitals  /81   Pulse 71   Temp 98.5 °F (36.9 °C) (Skin)   Resp 18   Ht 5' 7" (1.702 m)   Wt (!) 146.1 kg (322 lb)   SpO2 100%   BMI 50.43 kg/m²       Physical Exam  Vitals and nursing note reviewed.   Constitutional:       General: He is not in acute distress.     Appearance: Normal appearance. He is not ill-appearing or diaphoretic.   HENT:      Head: Normocephalic and atraumatic.      Mouth/Throat:      Mouth: Mucous membranes are moist.      Pharynx: Oropharynx is clear.   Eyes:      Extraocular Movements: Extraocular movements intact.      Conjunctiva/sclera: Conjunctivae normal.   Cardiovascular:      Rate and Rhythm: Normal rate and regular rhythm.      Pulses: Normal pulses.   Pulmonary:      Effort: Pulmonary effort is normal. No respiratory distress.   Musculoskeletal:      Right lower leg: No edema.      Left lower leg: No edema.   Skin:     General: Skin is warm and dry.   Neurological:      General: No focal deficit present.      Mental Status: He is alert and oriented to person, place, and time. Mental status is at baseline.   Psychiatric:         Mood and Affect: Mood normal.         Labs Reviewed:     Chemistry:  Lab Results   Component Value Date     04/06/2025    K 4.3 04/06/2025    BUN 13.6 04/06/2025    CREATININE 0.78 04/06/2025    EGFRNORACEVR >60 04/06/2025    GLUCOSE 75 04/06/2025    CALCIUM 8.2 (L) 04/06/2025    ALKPHOS 48 04/04/2025    LABPROT 5.0 (L) 04/04/2025    ALBUMIN 2.9 (L) 04/04/2025    BILIDIR 0.2 11/06/2024    AST 12 04/04/2025    ALT 7 04/04/2025    MG 1.8 12/06/2024    PHOS 3.0 12/06/2024    [id number] 63.1 03/11/2025    TSH 0.811 11/06/2024    LWPHLE3ZVES 1.06 02/08/2024        Lab Results   Component Value Date    HGBA1C 5.4 11/06/2024        Hematology:  Lab Results   Component Value Date    WBC 13.50 (H) 04/06/2025    HGB 8.3 (L) 04/06/2025    HCT 25.1 (L) 04/06/2025     " "04/06/2025       Lipid Panel:  Lab Results   Component Value Date    CHOL 105 02/03/2025    HDL 37 02/03/2025    LDL 56.00 02/03/2025    TRIG 59 02/03/2025    TOTALCHOLEST 3 02/03/2025        Urine:  No results found for: "COLORUA", "APPEARANCEUA", "SGUA", "PHUA", "PROTEINUA", "GLUCOSEUA", "KETONESUA", "BLOODUA", "NITRITESUA", "LEUKOCYTESUR", "RBCUA", "WBCUA", "BACTERIA", "SQEPUA", "HYALINECASTS", "CREATRANDUR", "PROTEINURINE", "UPROTCREA"     Assessment:       ICD-10-CM ICD-9-CM   1. Acute blood loss anemia  D62 285.1        Plan:     1. Acute blood loss anemia  Assessment & Plan:  Patient was admitted from 4/3/25-4/6/25 at [facility] for upper GI bleed from a marginal ulcer at the anastomosis site of prior Hugo-en-Y gastric bypass seen on EGD.  He was transfused 6 units of PRBC use.  Hemoglobin improved from 5.9 on admission to 8.3 on the day of discharge.    GI recommended PPI b.i.d. for 8 weeks and Carafate q.i.d. for 1 month.  He was discharged on ferrous sulfate daily.  Symptoms resolved.  We will recheck CBC and CMP in 1 month.    Orders:  -     CBC Auto Differential; Future; Expected date: 05/10/2025  -     Comprehensive Metabolic Panel; Future; Expected date: 05/10/2025         No follow-ups on file. In addition to their scheduled follow up, the patient has also been instructed to follow up on as needed basis.     Future Appointments   Date Time Provider Department Center   [facility]22[facility]2025  [facility]   6/4/2025  9:40 AM PROVIDER, [facility] MEDICINE FARHANA Osorio   11/24/2025  8:00 [facility]   12/4/2025  9:40 AM PROVIDER, [facility] BEBE Flower MD    "

## 2025-06-06 ENCOUNTER — PATIENT MESSAGE (OUTPATIENT)
Dept: BARIATRICS | Facility: CLINIC | Age: 55
End: 2025-06-06
Payer: COMMERCIAL

## 2025-06-11 ENCOUNTER — TELEPHONE (OUTPATIENT)
Dept: BARIATRICS | Facility: CLINIC | Age: 55
End: 2025-06-11
Payer: COMMERCIAL

## 2025-06-11 NOTE — TELEPHONE ENCOUNTER
Called pt he had a gi bleed and Dr. Faraz nichole  [phone] and fax #[phone] found an ulcer on exam. He has another scope scheduled for 6/16/25. He has a new pcp Elizabeth Naylor office # [phone] fax#[phone]. I asked him to get the results of the scope on 6/16 to our fax and we will send orders to him and his new pcp for his 6 m po labs to be drawn.

## 2025-06-12 ENCOUNTER — PATIENT MESSAGE (OUTPATIENT)
Dept: BARIATRICS | Facility: CLINIC | Age: 55
End: 2025-06-12
Payer: COMMERCIAL

## 2025-06-13 ENCOUNTER — PATIENT MESSAGE (OUTPATIENT)
Dept: ENDOSCOPY | Facility: HOSPITAL | Age: 55
End: 2025-06-13
Payer: COMMERCIAL

## 2025-06-14 ENCOUNTER — PATIENT MESSAGE (OUTPATIENT)
Dept: ENDOSCOPY | Facility: HOSPITAL | Age: 55
End: 2025-06-14
Payer: COMMERCIAL

## 2025-06-15 ENCOUNTER — PATIENT MESSAGE (OUTPATIENT)
Dept: ENDOSCOPY | Facility: HOSPITAL | Age: 55
End: 2025-06-15
Payer: COMMERCIAL

## 2025-06-16 ENCOUNTER — HOSPITAL ENCOUNTER (OUTPATIENT)
Facility: HOSPITAL | Age: 55
Discharge: HOME | End: 2025-06-16
Attending: INTERNAL MEDICINE | Admitting: INTERNAL MEDICINE
Payer: COMMERCIAL

## 2025-06-16 ENCOUNTER — ANESTHESIA EVENT (OUTPATIENT)
Dept: ENDOSCOPY | Facility: HOSPITAL | Age: 55
End: 2025-06-16
Payer: COMMERCIAL

## 2025-06-16 ENCOUNTER — ANESTHESIA (OUTPATIENT)
Dept: ENDOSCOPY | Facility: HOSPITAL | Age: 55
End: 2025-06-16
Payer: COMMERCIAL

## 2025-06-16 PROCEDURE — 37000009 HC ANESTHESIA EA ADD 15 MINS: Performed by: INTERNAL MEDICINE

## 2025-06-16 PROCEDURE — D9220A PRA ANESTHESIA: Mod: CRNA,COE,, | Performed by: NURSE ANESTHETIST, CERTIFIED REGISTERED

## 2025-06-16 PROCEDURE — 43235 EGD DIAGNOSTIC BRUSH WASH: CPT | Performed by: INTERNAL MEDICINE

## 2025-06-16 PROCEDURE — 25000003 PHARM REV CODE 250: Performed by: NURSE ANESTHETIST, CERTIFIED REGISTERED

## 2025-06-16 PROCEDURE — 37000008 HC ANESTHESIA 1ST 15 MINUTES: Performed by: INTERNAL MEDICINE

## 2025-06-16 PROCEDURE — D9220A PRA ANESTHESIA: Mod: ANES,COE,, | Performed by: ANESTHESIOLOGY

## 2025-06-16 PROCEDURE — 63600175 PHARM REV CODE 636 W HCPCS: Performed by: NURSE ANESTHETIST, CERTIFIED REGISTERED

## 2025-06-16 RX ORDER — METOCLOPRAMIDE HYDROCHLORIDE 5 MG/ML
10 INJECTION INTRAMUSCULAR; INTRAVENOUS EVERY 10 MIN PRN
Status: DISCONTINUED | OUTPATIENT
Start: 2025-06-16 | End: 2025-06-16 | Stop reason: HOSPADM

## 2025-06-16 RX ORDER — PROPOFOL 10 MG/ML
VIAL (ML) INTRAVENOUS
Status: COMPLETED
Start: 2025-06-16 | End: 2025-06-16

## 2025-06-16 RX ORDER — LIDOCAINE HYDROCHLORIDE 20 MG/ML
INJECTION, SOLUTION EPIDURAL; INFILTRATION; INTRACAUDAL; PERINEURAL
Status: DISCONTINUED
Start: 2025-06-16 | End: 2025-06-16 | Stop reason: HOSPADM

## 2025-06-16 RX ORDER — SODIUM CHLORIDE 9 MG/ML
INJECTION, SOLUTION INTRAVENOUS CONTINUOUS
Status: DISCONTINUED | OUTPATIENT
Start: 2025-06-16 | End: 2025-06-16 | Stop reason: HOSPADM

## 2025-06-16 RX ORDER — LIDOCAINE HYDROCHLORIDE 10 MG/ML
INJECTION, SOLUTION EPIDURAL; INFILTRATION; INTRACAUDAL; PERINEURAL
Status: DISCONTINUED | OUTPATIENT
Start: 2025-06-16 | End: 2025-06-16

## 2025-06-16 RX ORDER — KETAMINE HCL IN 0.9 % NACL 50 MG/5 ML
SYRINGE (ML) INTRAVENOUS
Status: COMPLETED
Start: 2025-06-16 | End: 2025-06-16

## 2025-06-16 RX ORDER — PROPOFOL 10 MG/ML
VIAL (ML) INTRAVENOUS
Status: DISCONTINUED | OUTPATIENT
Start: 2025-06-16 | End: 2025-06-16

## 2025-06-16 RX ORDER — ONDANSETRON HYDROCHLORIDE 2 MG/ML
4 INJECTION, SOLUTION INTRAVENOUS ONCE
Status: DISCONTINUED | OUTPATIENT
Start: 2025-06-16 | End: 2025-06-16 | Stop reason: HOSPADM

## 2025-06-16 RX ORDER — KETAMINE HCL IN 0.9 % NACL 50 MG/5 ML
SYRINGE (ML) INTRAVENOUS
Status: DISCONTINUED | OUTPATIENT
Start: 2025-06-16 | End: 2025-06-16

## 2025-06-16 RX ORDER — LIDOCAINE HYDROCHLORIDE 10 MG/ML
1 INJECTION, SOLUTION EPIDURAL; INFILTRATION; INTRACAUDAL; PERINEURAL ONCE
Status: DISCONTINUED | OUTPATIENT
Start: 2025-06-16 | End: 2025-06-16 | Stop reason: HOSPADM

## 2025-06-16 RX ADMIN — PROPOFOL 50 MG: 10 INJECTION, EMULSION INTRAVENOUS at 10:06

## 2025-06-16 RX ADMIN — SODIUM CHLORIDE, SODIUM GLUCONATE, SODIUM ACETATE, POTASSIUM CHLORIDE AND MAGNESIUM CHLORIDE: 526; 502; 368; 37; 30 INJECTION, SOLUTION INTRAVENOUS at 10:06

## 2025-06-16 RX ADMIN — Medication 30 MG: at 10:06

## 2025-06-16 RX ADMIN — LIDOCAINE HYDROCHLORIDE 100 MG: 10 INJECTION, SOLUTION EPIDURAL; INFILTRATION; INTRACAUDAL; PERINEURAL at 10:06

## 2025-06-16 NOTE — ANESTHESIA PREPROCEDURE EVALUATION
06/16/2025  Hayden Puckett is a 55 y.o., male w/ h/o Melena presents for EGD.        Other Medical History   Sleep apnea Hypertension   Obesity, Hypogonadism in male   QUEENIE (obstructive sleep apnea) Insulin resistance   Vitamin D deficiency Hypothyroidism associated with surgical procedure   Coronary artery disease Hyperlipidemia   Diabetes mellitus, type 2      Surgical History    TONSILLECTOMY THYROID SURGERY   COLONOSCOPY MANDIBLE FRACTURE SURGERY   LEFT HEART CATHETERIZATION LAPAROSCOPIC GASTROENTEROSTOMY   ESOPHAGOGASTRODUODENOSCOPY PTCA w/   Stent     Problem List  Current as of 06/16/25 0933  Acquired hypothyroidism Acute blood loss anemia   Anxiety BMI 60.0-69.9, adult   Coronary artery disease involving native coronary artery of native heart without angina pectoris Edema   History of COVID-19 History of bariatric surgery   History of type 2 diabetes mellitus Hypertension   Jaw fracture Mixed hyperlipidemia   QUEENIE treated with BiPAP Obesity   Screening for colon cancer Vitamin D deficienc     H/H: 8/25  PLT: 268  EKG: Normal sinus rhythm   Nonspecific ST and T wave abnormality         Pre-op Assessment    I have reviewed the Patient Summary Reports.     I have reviewed the Nursing Notes. I have reviewed the NPO Status.   I have reviewed the Medications.     Review of Systems  Anesthesia Hx:  No problems with previous Anesthesia                Social:  Non-Smoker       Hematology/Oncology:       -- Anemia:                                  Cardiovascular:     Hypertension   CAD           hyperlipidemia   ECG has been reviewed.                            Pulmonary:        Sleep Apnea, CPAP                Endocrine:  Diabetes Hypothyroidism        Morbid Obesity / BMI > 40      Physical Exam  General: Well nourished, Cooperative, Alert and Oriented    Airway:  Mallampati: IV   Mouth Opening: Normal  TM  Distance: Normal  Tongue: Large  Neck ROM: Normal ROM  Neck: Girth Increased    Dental:  Intact    Chest/Lungs:  Clear to auscultation, Normal Respiratory Rate    Heart:  Rate: Normal  Rhythm: Regular Rhythm  Sounds: Normal    Abdomen:  Normal, Soft, Nontender        Anesthesia Plan  Type of Anesthesia, risks & benefits discussed:    Anesthesia Type: MAC  Intra-op Monitoring Plan: Standard ASA Monitors  Post Op Pain Control Plan: multimodal analgesia  Induction:  IV  Airway Plan: Direct  Informed Consent: Informed consent signed with the Patient and all parties understand the risks and agree with anesthesia plan.  All questions answered.   ASA Score: 3  Day of Surgery Review of History & Physical: H&P Update referred to the surgeon/provider.    Ready For Surgery From Anesthesia Perspective.     .

## 2025-06-16 NOTE — ANESTHESIA POSTPROCEDURE EVALUATION
Anesthesia Post Evaluation    Patient: Hayden Puckett    Procedure(s) Performed: Procedure(s) (LRB):  EGD (N/A)    Final Anesthesia Type: MAC      Patient location during evaluation: PACU  Patient participation: Yes- Able to Participate  Level of consciousness: awake and alert  Post-procedure vital signs: reviewed and stable  Pain management: adequate  Airway patency: patent  QUEENIE mitigation strategies: Extubation and recovery carried out in lateral, semiupright, or other nonsupine position  PONV status at discharge: No PONV  Anesthetic complications: no      Cardiovascular status: blood pressure returned to baseline  Respiratory status: room air and unassisted  Hydration status: euvolemic  Follow-up not needed.  Comments: PT. Appears to have adequately recovered from Anesthetic. VSS, airway patient. No apparent complications noted.              Vitals Value Taken Time   /84 06/16/25 11:03   Temp 36.5 °C (97.7 °F) 06/16/25 10:40   Pulse 56 06/16/25 11:03   Resp 12 06/16/25 11:03   SpO2 98 % 06/16/25 11:03         No case tracking events are documented in the log.      Pain/Kelly Score: Kelly Score: 10 (6/16/2025 11:10 AM)

## 2025-06-16 NOTE — TRANSFER OF CARE
"Anesthesia Transfer of Care Note    Patient: Hayden Puckett    Procedure(s) Performed: Procedure(s) (LRB):  EGD (N/A)    Patient location: GI    Anesthesia Type: MAC    Transport from OR: Transported from OR on room air with adequate spontaneous ventilation    Post pain: adequate analgesia    Post assessment: no apparent anesthetic complications    Post vital signs: stable    Level of consciousness: awake    Nausea/Vomiting: no nausea/vomiting    Complications: none    Transfer of care protocol was followed      Last vitals: Visit Vitals  BP (!) 165/99   Pulse 64   Temp 36.3 °C (97.3 °F)   Resp 16   Ht 5' 7" (1.702 m)   Wt 132 kg (291 lb)   SpO2 100%   BMI 45.58 kg/m²     "

## 2025-06-16 NOTE — H&P
[facility]  Gastroenterology  H&P    Patient Name: Hayden Puckett  MRN: [id number]  Admission Date: 6/16/2025  Code Status: Prior    Attending Provider: Drake Farley MD   Primary Care Physician: Alex Calvillo Sr., MD (Inactive)  Principal Problem:<principal problem not specified>    Subjective:     History of Present Illness: personal h/o morbid obesity s/p gastric bypass complicated by anastomotic marginal ulcer. Presents for surveillance of this ulceration. Gi review: denies overt bleeding or pain at this time. Plan--proceed to egd    Past Medical History:   Diagnosis Date    Coronary artery disease     Diabetes mellitus, type 2     Hyperlipidemia     Hypertension     Hypogonadism in male     Hypothyroidism associated with surgical procedure     Insulin resistance     Obesity,     QUEENIE (obstructive sleep apnea)     Sleep apnea     Vitamin D deficiency        Past Surgical History:   Procedure Laterality Date    COLONOSCOPY N/A 11/23/2022    Procedure: COLON;  Surgeon: Drake Farley MD;  Location: [facility] ENDOSCOPY;  Service: Gastroenterology;  Laterality: N/A;    ESOPHAGOGASTRODUODENOSCOPY N/A 4/3/2025    Procedure: EGD;  Surgeon: Faraz Alfred MD;  Location: [facility] ENDOSCOPY;  Service: Endoscopy;  Laterality: N/A;  Nexpowder, clip x 1 , APC of Anastomotic site    LAPAROSCOPIC GASTROENTEROSTOMY N/A 12/5/2024    Procedure: GASTROENTEROSTOMY, LAPAROSCOPIC W/ INTRA OP EGD;  Surgeon: Darien Bai Jr., MD;  Location: [address];  Service: General;  Laterality: N/A;  VASYL PT  WAL#  [id number]   BMI 66.33   [employer] REP NOTIFIED anesthesia to complete regional block    LEFT HEART CATHETERIZATION  2022    MANDIBLE FRACTURE SURGERY      THYROID SURGERY      TONSILLECTOMY         Review of patient's allergies indicates:  No Known Allergies  Family History       Problem Relation (Age of Onset)    Breast cancer Maternal Grandmother    Diabetes  Mellitus Paternal Grandmother    Hypertension Mother, Father    Lung cancer Maternal Grandfather          Tobacco Use    Smoking status: Never    Smokeless tobacco: Never   Substance and Sexual Activity    Alcohol use: Not Currently     Comment: A few times in a year    Drug use: Never    Sexual activity: Not on file     Review of Systems  Objective:     Vital Signs (Most Recent):  Temp: 97.3 °F (36.3 °C) (06/16/25 1014)  Pulse: 64 (06/16/25 1014)  Resp: 16 (06/16/25 1014)  BP: (!) 165/99 (06/16/25 1014)  SpO2: 100 % (06/16/25 1014) Vital Signs (24h Range):  Temp:  [97.3 °F (36.3 °C)] 97.3 °F (36.3 °C)  Pulse:  [64] 64  Resp:  [16] 16  SpO2:  [100 %] 100 %  BP: (165)/(99) 165/99     Weight: 132 kg (291 lb) (06/12/25 1322)  Body mass index is 45.58 kg/m².    No intake or output data in the 24 hours ending 06/16/25 1027    Lines/Drains/Airways       Peripheral Intravenous Line  Duration                  Peripheral IV - Single Lumen 20 G Posterior;Right Hand -- days                    Physical Exam    Significant Labs:  All pertinent lab results from the last 24 hours have been reviewed.    Significant Imaging:  Imaging results within the past 24 hours have been reviewed.    Assessment/Plan:     There are no hospital problems to display for this patient.      personal h/o morbid obesity s/p gastric bypass complicated by anastomotic marginal ulcer. Presents for surveillance of this ulceration. Gi review: denies overt bleeding or pain at this time. Plan--proceed to egd    Drake Farley MD  Gastroenterology  [facility]

## 2025-06-18 ENCOUNTER — PATIENT MESSAGE (OUTPATIENT)
Dept: ENDOSCOPY | Facility: HOSPITAL | Age: 55
End: 2025-06-18
Payer: COMMERCIAL

## 2025-06-18 VITALS
DIASTOLIC BLOOD PRESSURE: 84 MMHG | BODY MASS INDEX: 45.67 KG/M2 | SYSTOLIC BLOOD PRESSURE: 150 MMHG | WEIGHT: 291 LBS | RESPIRATION RATE: 12 BRPM | OXYGEN SATURATION: 98 % | TEMPERATURE: 98 F | HEIGHT: 67 IN | HEART RATE: 56 BPM

## 2025-07-09 ENCOUNTER — TELEPHONE (OUTPATIENT)
Dept: BARIATRICS | Facility: CLINIC | Age: 55
End: 2025-07-09
Payer: COMMERCIAL

## 2025-07-09 NOTE — TELEPHONE ENCOUNTER
Copied from CRM #[id number]. Topic: General Inquiry - Patient Advice  >> Jul 9, 2025  8:54 AM Jose wrote:  Hi,    Who called: The pt       Reason: Called to say he has a new PCP and is asking for Nurse Medrano to resend the Lab orders to the new PCP's office today. Pls fax orders to [phone]. Pls call the pt at [phone] to discuss.       Provider's name: Dr. Bai       Additional Information: Thank you

## 2025-07-10 NOTE — TELEPHONE ENCOUNTER
Phoned pt to notify him that I am sending the op notes,post op ov notes and labs to the new pcp per his request.

## 2025-07-18 ENCOUNTER — TELEPHONE (OUTPATIENT)
Dept: BARIATRICS | Facility: CLINIC | Age: 55
End: 2025-07-18
Payer: COMMERCIAL

## 2025-07-18 ENCOUNTER — PATIENT MESSAGE (OUTPATIENT)
Dept: BARIATRICS | Facility: CLINIC | Age: 55
End: 2025-07-18
Payer: COMMERCIAL

## 2025-07-18 NOTE — TELEPHONE ENCOUNTER
Pt called and wanted to make sure we had his labs from his last visit with his pcp. He had given us the new pcp name and number and I added it to his care team awhile ago. Sent message to Malcom to make sure we get the last ovn and labs. Pt also stated the pcp said the labs looked good. He wants to make sure they are good on our part.

## (undated) DEVICE — SOL IRRI STRL WATER 1000ML

## (undated) DEVICE — SUT VICRYL+ 27 UR-6 VIOL

## (undated) DEVICE — ADAPTER DUAL NSL LUER M-M 7FT

## (undated) DEVICE — TIP SUCTION YANKAUER

## (undated) DEVICE — SUT ENDOSTITCH VLOC 8IN 3-0

## (undated) DEVICE — KIT CANIST SUCTION 1200CC

## (undated) DEVICE — TROCAR ENDOPATH XCEL 12X100MM

## (undated) DEVICE — DEVICE RESOLUTION 360 CLIP

## (undated) DEVICE — TUBING O2 FEMALE CONN 13FT

## (undated) DEVICE — COLLECTION SPECIMEN NEPTUNE

## (undated) DEVICE — STAPLER 3.5MM

## (undated) DEVICE — Device

## (undated) DEVICE — BLOCK BLOX BITE DENT RIM 54FR

## (undated) DEVICE — SHEARS HARMONIC 5CM 36CM

## (undated) DEVICE — BAG TISS RETRV MONARCH 10MM

## (undated) DEVICE — MANIFOLD 4 PORT

## (undated) DEVICE — SYS NEXPOWDER HEMOSTATIC

## (undated) DEVICE — SOL NACL 0.9% IV INJ 1000ML

## (undated) DEVICE — FORCEP BIOPSY 5.5FR STD 50CM

## (undated) DEVICE — CABLE EKG DL RBBN 3 LEAD DISP

## (undated) DEVICE — UNDERPAD DISPOSABLE 30X30IN

## (undated) DEVICE — TRAY GENERAL SURGERY OMC

## (undated) DEVICE — PENCIL ROCKER SWITCH 10FT CORD

## (undated) DEVICE — STAPLER ECHELON LONG 60X440MM

## (undated) DEVICE — BOWL STERILE LARGE 32OZ

## (undated) DEVICE — RELOAD ECHELON FLEX BLU 60MM

## (undated) DEVICE — SYR 10CC LUER LOCK

## (undated) DEVICE — TROCAR ENDOPATH XCEL 12MM 10CM

## (undated) DEVICE — DRAPE ABDOMINAL TIBURON 14X11

## (undated) DEVICE — ENDOSTITCH POLYSORB 2-0 ES-9

## (undated) DEVICE — RELOAD ECHELON FLEX WHT 60MM

## (undated) DEVICE — ANVIL ACCESSORY 25MM DST

## (undated) DEVICE — FIRE PROBES STRAIGHT

## (undated) DEVICE — APPLICATOR CHLORAPREP ORN 26ML

## (undated) DEVICE — KIT SURGICAL COLON .25 1.1OZ

## (undated) DEVICE — LUBRICANT SURGILUBE 2 OZ

## (undated) DEVICE — SUT SURGIDAC ENDO STITCH2-0

## (undated) DEVICE — CONTAINER SPECIMEN 4OZ

## (undated) DEVICE — SNARE CAPTIFLEX 2.4X27MM 240CM

## (undated) DEVICE — ENDOSTITCH INSTRUMENT

## (undated) DEVICE — STAPLER ECHELON FLEX GST 60MM

## (undated) DEVICE — SYRINGE 0.9% NACL 10MIL PREFIL

## (undated) DEVICE — SUT MCRYL PLUS 4-0 PS2 27IN

## (undated) DEVICE — TROCAR ENDOPATH XCEL 5X100MM

## (undated) DEVICE — NDL HYPO REG 25G X 1 1/2

## (undated) DEVICE — IRRIGATOR ENDOSCOPY DISP.

## (undated) DEVICE — TUBING HF INSUFFLATION W/ FLTR

## (undated) DEVICE — GOWN POLY REINF BRTH SLV XL

## (undated) DEVICE — CANNULA ENDOPATH XCEL 5X100MM

## (undated) DEVICE — LINER MEDI-VAC PPV FLTR 1500CC

## (undated) DEVICE — ELECTRODE REM PLYHSV RETURN 9